# Patient Record
Sex: FEMALE | Race: BLACK OR AFRICAN AMERICAN | NOT HISPANIC OR LATINO | Employment: OTHER | URBAN - METROPOLITAN AREA
[De-identification: names, ages, dates, MRNs, and addresses within clinical notes are randomized per-mention and may not be internally consistent; named-entity substitution may affect disease eponyms.]

---

## 2017-10-02 ENCOUNTER — ALLSCRIPTS OFFICE VISIT (OUTPATIENT)
Dept: OTHER | Facility: OTHER | Age: 72
End: 2017-10-02

## 2017-10-02 DIAGNOSIS — Z13.6 ENCOUNTER FOR SCREENING FOR CARDIOVASCULAR DISORDERS: ICD-10-CM

## 2017-10-02 DIAGNOSIS — Z13.89 ENCOUNTER FOR SCREENING FOR OTHER DISORDER: ICD-10-CM

## 2017-10-02 DIAGNOSIS — D72.829 ELEVATED WHITE BLOOD CELL COUNT: ICD-10-CM

## 2017-10-02 DIAGNOSIS — Z13.29 ENCOUNTER FOR SCREENING FOR OTHER SUSPECTED ENDOCRINE DISORDER: ICD-10-CM

## 2017-10-02 DIAGNOSIS — Z13.820 ENCOUNTER FOR SCREENING FOR OSTEOPOROSIS: ICD-10-CM

## 2017-10-02 DIAGNOSIS — Z13.5 ENCOUNTER FOR SCREENING FOR EYE AND EAR DISORDERS: ICD-10-CM

## 2017-10-04 ENCOUNTER — TRANSCRIBE ORDERS (OUTPATIENT)
Dept: ADMINISTRATIVE | Facility: HOSPITAL | Age: 72
End: 2017-10-04

## 2017-10-04 DIAGNOSIS — Z12.39 SCREENING BREAST EXAMINATION: Primary | ICD-10-CM

## 2017-10-10 ENCOUNTER — TRANSCRIBE ORDERS (OUTPATIENT)
Dept: ADMINISTRATIVE | Facility: HOSPITAL | Age: 72
End: 2017-10-10

## 2017-10-10 ENCOUNTER — APPOINTMENT (OUTPATIENT)
Dept: LAB | Facility: HOSPITAL | Age: 72
End: 2017-10-10
Attending: FAMILY MEDICINE
Payer: MEDICARE

## 2017-10-10 ENCOUNTER — GENERIC CONVERSION - ENCOUNTER (OUTPATIENT)
Dept: OTHER | Facility: OTHER | Age: 72
End: 2017-10-10

## 2017-10-10 DIAGNOSIS — Z13.5 ENCOUNTER FOR SCREENING FOR EYE AND EAR DISORDERS: ICD-10-CM

## 2017-10-10 DIAGNOSIS — Z13.6 ENCOUNTER FOR SCREENING FOR CARDIOVASCULAR DISORDERS: ICD-10-CM

## 2017-10-10 DIAGNOSIS — Z13.89 ENCOUNTER FOR SCREENING FOR OTHER DISORDER: ICD-10-CM

## 2017-10-10 DIAGNOSIS — Z13.29 ENCOUNTER FOR SCREENING FOR OTHER SUSPECTED ENDOCRINE DISORDER: ICD-10-CM

## 2017-10-10 DIAGNOSIS — D72.829 ELEVATED WHITE BLOOD CELL COUNT: ICD-10-CM

## 2017-10-10 LAB
ALBUMIN SERPL BCP-MCNC: 3.5 G/DL (ref 3.5–5)
ALP SERPL-CCNC: 60 U/L (ref 46–116)
ALT SERPL W P-5'-P-CCNC: 25 U/L (ref 12–78)
ANION GAP SERPL CALCULATED.3IONS-SCNC: 6 MMOL/L (ref 4–13)
AST SERPL W P-5'-P-CCNC: 18 U/L (ref 5–45)
BASOPHILS # BLD AUTO: 0 THOUSANDS/ΜL (ref 0–0.1)
BASOPHILS NFR BLD AUTO: 0 % (ref 0–1)
BILIRUB SERPL-MCNC: 0.6 MG/DL (ref 0.2–1)
BUN SERPL-MCNC: 20 MG/DL (ref 5–25)
CALCIUM SERPL-MCNC: 9.4 MG/DL (ref 8.3–10.1)
CHLORIDE SERPL-SCNC: 105 MMOL/L (ref 100–108)
CHOLEST SERPL-MCNC: 187 MG/DL (ref 50–200)
CO2 SERPL-SCNC: 31 MMOL/L (ref 21–32)
CREAT SERPL-MCNC: 0.88 MG/DL (ref 0.6–1.3)
EOSINOPHIL # BLD AUTO: 0 THOUSAND/ΜL (ref 0–0.61)
EOSINOPHIL NFR BLD AUTO: 0 % (ref 0–6)
ERYTHROCYTE [DISTWIDTH] IN BLOOD BY AUTOMATED COUNT: 15.8 % (ref 11.6–15.1)
GFR SERPL CREATININE-BSD FRML MDRD: 78 ML/MIN/1.73SQ M
GLUCOSE P FAST SERPL-MCNC: 86 MG/DL (ref 65–99)
HCT VFR BLD AUTO: 37.8 % (ref 37–47)
HDLC SERPL-MCNC: 84 MG/DL (ref 40–60)
HGB BLD-MCNC: 12.8 G/DL (ref 12–16)
LDLC SERPL CALC-MCNC: 92 MG/DL (ref 0–100)
LYMPHOCYTES # BLD AUTO: 2.1 THOUSANDS/ΜL (ref 0.6–4.47)
LYMPHOCYTES NFR BLD AUTO: 57 % (ref 14–44)
MCH RBC QN AUTO: 28.6 PG (ref 27–31)
MCHC RBC AUTO-ENTMCNC: 34 G/DL (ref 31.4–37.4)
MCV RBC AUTO: 84 FL (ref 82–98)
MONOCYTES # BLD AUTO: 0.3 THOUSAND/ΜL (ref 0.17–1.22)
MONOCYTES NFR BLD AUTO: 9 % (ref 4–12)
NEUTROPHILS # BLD AUTO: 1.3 THOUSANDS/ΜL (ref 1.85–7.62)
NEUTS SEG NFR BLD AUTO: 34 % (ref 43–75)
NRBC BLD AUTO-RTO: 0 /100 WBCS
PLATELET # BLD AUTO: 228 THOUSANDS/UL (ref 130–400)
PLATELET BLD QL SMEAR: ADEQUATE
PMV BLD AUTO: 7.2 FL (ref 8.9–12.7)
POTASSIUM SERPL-SCNC: 3.6 MMOL/L (ref 3.5–5.3)
PROT SERPL-MCNC: 7.6 G/DL (ref 6.4–8.2)
RBC # BLD AUTO: 4.48 MILLION/UL (ref 4.2–5.4)
SODIUM SERPL-SCNC: 142 MMOL/L (ref 136–145)
TRIGL SERPL-MCNC: 53 MG/DL
TSH SERPL DL<=0.05 MIU/L-ACNC: 3.19 UIU/ML (ref 0.36–3.74)
WBC # BLD AUTO: 3.7 THOUSAND/UL (ref 4.8–10.8)

## 2017-10-10 PROCEDURE — 84443 ASSAY THYROID STIM HORMONE: CPT

## 2017-10-10 PROCEDURE — 80061 LIPID PANEL: CPT

## 2017-10-10 PROCEDURE — 36415 COLL VENOUS BLD VENIPUNCTURE: CPT

## 2017-10-10 PROCEDURE — 85025 COMPLETE CBC W/AUTO DIFF WBC: CPT

## 2017-10-10 PROCEDURE — 80053 COMPREHEN METABOLIC PANEL: CPT

## 2017-10-13 ENCOUNTER — GENERIC CONVERSION - ENCOUNTER (OUTPATIENT)
Dept: OTHER | Facility: OTHER | Age: 72
End: 2017-10-13

## 2017-10-13 ENCOUNTER — HOSPITAL ENCOUNTER (OUTPATIENT)
Dept: RADIOLOGY | Facility: HOSPITAL | Age: 72
Discharge: HOME | End: 2017-10-13
Attending: FAMILY MEDICINE
Payer: MEDICARE

## 2017-10-13 DIAGNOSIS — Z13.820 ENCOUNTER FOR SCREENING FOR OSTEOPOROSIS: ICD-10-CM

## 2017-10-13 DIAGNOSIS — Z12.39 SCREENING BREAST EXAMINATION: ICD-10-CM

## 2017-10-13 PROCEDURE — G0202 SCR MAMMO BI INCL CAD: HCPCS

## 2017-10-13 PROCEDURE — 77080 DXA BONE DENSITY AXIAL: CPT

## 2017-10-16 ENCOUNTER — GENERIC CONVERSION - ENCOUNTER (OUTPATIENT)
Dept: OTHER | Facility: OTHER | Age: 72
End: 2017-10-16

## 2017-11-06 ENCOUNTER — ALLSCRIPTS OFFICE VISIT (OUTPATIENT)
Dept: OTHER | Facility: OTHER | Age: 72
End: 2017-11-06

## 2017-11-07 NOTE — PROGRESS NOTES
Assessment  1  History of Other acute sinusitis (461 8) (J01 80)   2  Other acute sinusitis (461 8) (J01 80)    Plan  Other acute sinusitis    · Amoxicillin-Pot Clavulanate 875-125 MG Oral Tablet (Augmentin); TAKE 1 TABLET  EVERY 12 HOURS DAILY   · Benzonatate 200 MG Oral Capsule; TAKE 1 CAPSULE 3 TIMES DAILY AS  NEEDED    Chief Complaint  chest congestion ,sore throat, fever      History of Present Illness  HPI: Pt states she is congested has a cough as well a fever, throat hurts   over a week agois sick as wellhas been getting worse      Review of Systems    Constitutional: fever,-- feeling poorly-- and-- chills, but-- as noted in HPI    ENT: earache,-- sore throat,-- nasal discharge-- and-- hoarseness  Cardiovascular: no complaints of slow or fast heart rate, no chest pain, no palpitations, no leg claudication or lower extremity edema  Respiratory: cough, but-- as noted in HPI  Breasts: no complaints of breast pain, breast lump or nipple discharge  Gastrointestinal: no complaints of abdominal pain, no constipation, no nausea or diarrhea, no vomiting, no bloody stools  Genitourinary: no complaints of dysuria, no incontinence, no pelvic pain, no dysmenorrhea, no vaginal discharge or abnormal vaginal bleeding  Active Problems  1  Abnormal mammogram of right breast (793 80) (R92 8)   2  BMI 28 0-28 9,adult (V85 24) (Z68 28)   3  Encounter for colorectal cancer screening (V76 51) (Z12 11,Z12 12)   4  Encounter for screening breast examination (V76 10) (Z12 31)   5  Encounter for screening for malignant neoplasm of colon (V76 51) (Z12 11)   6  Encounter for screening for osteoporosis (V82 81) (Z13 820)   7  Encounter for screening mammogram for malignant neoplasm of breast (V76 12)   (Z12 31)   8  Encounter for special screening examination for genitourinary disorder (V81 6) (Z13 89)   9  History of colon polyps (V12 72) (Z86 010)   10  Leukocytosis (288 60) (D72 829)   11   Medicare annual wellness visit, subsequent (V70 0) (Z00 00)   12  Need for influenza vaccination (V04 81) (Z23)   13  Need for pneumococcal vaccination (V03 82) (Z23)   14  Neutropenia, unspecified (288 00) (D70 9)   15  Never a smoker   16  Overweight (BMI 25 0-29 9) (278 02) (E66 3)   17  Screening for cardiovascular condition (V81 2) (Z13 6)   18  Screening for cervical cancer (V76 2) (Z12 4)   19  Screening for genitourinary condition (V81 6) (Z13 89)   20  Screening for glaucoma (V80 1) (Z13 5)   21  Screening for thyroid disorder (V77 0) (Z13 29)    Past Medical History  1  History of Cellulitis of hand (682 4) (L03 119)   2  History of leukocytosis (V12 3) (Z86 2)   3  History of Medicare annual wellness visit, initial (V70 0) (Z00 00)  Active Problems And Past Medical History Reviewed: The active problems and past medical history were reviewed and updated today  Family History  Mother    1  Family history of Arthritis of knee   2  Family history of hypertension (V17 49) (Z82 49)   3  Family history of Pacemaker Placement  Father    4  Family history of hypertension (V17 49) (Z82 49)  Brother    5  Family history of cardiac disorder (V17 49) (Z82 49)  Family History Reviewed: The family history was reviewed and updated today  Social History   · Never a smoker  The social history was reviewed and updated today  Surgical History  1  History of  Section  Surgical History Reviewed: The surgical history was reviewed and updated today  Current Meds   1  Calcium 500 MG TABS; TAKE 1 TABLET DAILY; Therapy: [id number] to Recorded   2  Multi-Day Oral Tablet; TAKE 1 TABLET DAILY; Therapy: 36VPJ3485 to Recorded   3  Osteo-Bi-Flex TABS; TWICE  A DAY; Therapy: ([phone] 15) to Recorded    The medication list was reviewed and updated today  Allergies  1   No Known Drug Allergies    Vitals   Recorded: 14BHL7796 02:45PM   Temperature 100 1 F   Heart Rate 72   Respiration 20   Systolic 635   Diastolic 76 Height 5 ft 2 5 in   Weight 157 lb    BMI Calculated 28 26   BSA Calculated 1 73     Physical Exam    Constitutional   General appearance: No acute distress, well appearing and well nourished  Eyes   Conjunctiva and lids: No swelling, erythema or discharge  Ears, Nose, Mouth, and Throat   External inspection of ears and nose: Normal     Otoscopic examination: Abnormal   The right tympanic membrane was bulging  The left tympanic membrane was bulging  Nasal mucosa, septum, and turbinates: Abnormal   The bilateral nasal mucosa was red  Oropharynx: Abnormal   The posterior pharynx was erythematous  Pulmonary   Auscultation of lungs: Clear to auscultation  Cardiovascular   Auscultation of heart: Normal rate and rhythm, normal S1 and S2, without murmurs           Signatures   Electronically signed by : Nikki Mendez DO; Nov 6 2017  3:08PM EST                       (Author)

## 2018-01-11 NOTE — RESULT NOTES
Verified Results  * MAMMO SCREENING BILATERAL W CAD [id number] 09:14AM Sue Brooks     Test Name Result Flag Reference   MAMMO SCREENING BILATERAL W CAD (Report)     Patient History:   Patient is postmenopausal and is of Ashkenazi Sikhism descent  No known family history of cancer  Patient's BMI is 28 3  Reason for exam: screening (asymptomatic)  Mammo Screening Bilateral W CAD: October 10, 2016 - Check In #:    [de-identified]   Bilateral CC and MLO view(s) were taken  Technologist: BERTIN Rocha   Prior study comparison: October 5, 2015, [facility] screening bilateral   digital vikas performed at [place] [address]  June 26, 2014, digital bilateral screening mammogram, performed    at [facility]  July 18, 2013, right breast digital unilat    mammo/CAD, performed at [facility]  Hortencia 3, 2013, digital    bilateral screening mammogram, performed at [facility]  June 12, 2012, bilateral screening mammogram, performed at [facility]  June 13, 2011, bilateral screening mammogram, performed   at [facility]  There are scattered fibroglandular densities  No new dominant    soft tissue mass, architectural distortion or suspicious    calcifications are noted  The skin and nipple structures are    within normal limits  Stable left asymmetric tissue  No mammographic evidence of malignancy  No    significant changes when compared with prior studies  ASSESSMENT: BiRad:2 - Benign     Recommendation:   Routine screening mammogram of both breasts in 1 year  Analyzed by CAD     8-10% of cancers will be missed on mammography  Management of a    palpable abnormality must be based on clinical grounds  Patients   will be notified of their results via letter from our facility  Accredited by [employer] and FDA       Transcription Location: [facility] [address] 98: [id number]     Risk Value(s):   Theresa 10 Year: 2 055%, Theresa Lifetime: 3 773%,    Myriad Table: 8 2%, DANIKA 5 Year: 1 7%, NCI Lifetime: 5 3%   Signed by:   Fritz Murdock MD   10/10/16       Discussion/Summary   mammo acceptable please repeat in a year

## 2018-01-11 NOTE — RESULT NOTES
Discussion/Summary   acceptable bone density     Verified Results  * DXA BONE DENSITY SPINE HIP AND PELVIS 13Oct2017 09:13AM Melani Salmeron Order Number: [id number]    - Patient Instructions: To schedule this appointment, please contact Central Scheduling at [phone]  Test Name Result Flag Reference   DXA BONE DENSITY SPINE HIP AND PELVIS (Report)     CENTRAL DXA SCAN     CLINICAL HISTORY: 57-year-old woman  Menopause at age 54  OTHER RISK FACTORS: None  TECHNIQUE: Bone densitometry was performed using a [employer] bone densitometer  Regions of interest appear properly placed  COMPARISON: October 5, 2015  RESULTS:      LUMBAR SPINE L1-L4: BMD 1 258 gm/cm2 / T-score 0 5 / Z score 1 5         LEFT TOTAL HIP: BMD 1 115 gm/cm2 / T-score 0 9 / Z score 1 3   LEFT FEMORAL NECK: BMD 1 063 gm/cm2 / T score 0 2 / Z score 0 9     RIGHT TOTAL HIP: BMD 1 028 gm/cm2 / T-score 0 2 / Z score 0 6   RIGHT FEMORAL NECK: BMD 0 958 gm/cm2 / T score -0 6 / Z score 0 2     MEAN TOTAL HIP: BMD 1 072 gm/cm2     CHANGE: Since the last DXA:   LUMBAR SPINE BMD has increased 0 007 gm/cm2 or 0 6%  This change is not statistically significant  HIP BMD has decreased 0 007 gm/cm2 or 0 6%  This change is not statistically significant  IMPRESSION:     1  Normal bone density  2  Since a DXA study from October 5, 2015, there has been no statistically significant change in bone mineral density  2  The 10 year risk of hip fracture is 0 3% with the 10 year risk of major osteoporotic fracture being 3 5% as calculated by the [facility]/WHO fracture risk assessment tool (FRAX)  3  The current NOF guidelines recommend treating patients with a T-score of -2 5 or less in the lumbar spine or hips, or in post-menopausal women and men over the age of 48 with low bone mass (osteopenia) and a FRAX 10 year risk score of >3% for hip    fracture and/or >20% for major osteoporotic fracture       4  A daily intake of at least 1200 mg calcium and vitamin D 800- 1000 IU, as well as weight bearing and muscle strengthening exercise, fall prevention and avoidance of tobacco and excessive alcohol as preventive measurements are suggested  5  Follow-up DXA in two years is recommended for most patients  A one year follow-up DXA is recommended after initiation or change in therapy for osteoporosis  More frequent evaluation is also appropriate for patients with conditions associated with    rapid bone loss, such as glucocorticoid therapy  The FRAX tool has not been validated in patients currently or previously treated with pharmacotherapy for osteoporosis  In such patients, clinical judgment must be exercised in interpreting FRAX scores  It is not appropriate to use FRAX to monitor    treatment response  WHO CLASSIFICATION:   Normal (a T-score of -1 0 or higher)   Low bone mineral density (a T-score of less than -1 0 but higher than -2 5)   Osteoporosis (a T-score of -2 5 or less)   Severe osteoporosis (a T-score of -2 5 or less with a fragility fracture)     Least significant change (lumbar spine): 0 039 gm/cm2 (3 6%)  Least significant change (total hip): 0 019 gm/cm2 (2 1%)  Workstation performed: [id number]     Signed by:   Di Diop MD   10/16/17       Plan  Encounter for screening for osteoporosis    · * Dexa Scan Axial Skel (Hip, Pelvis, Spine) ; every 2 years; Last [id number];  Next  67UAF0091; Status:Active

## 2018-01-13 VITALS
WEIGHT: 157.13 LBS | HEIGHT: 63 IN | BODY MASS INDEX: 27.84 KG/M2 | RESPIRATION RATE: 18 BRPM | TEMPERATURE: 98.4 F | DIASTOLIC BLOOD PRESSURE: 66 MMHG | HEART RATE: 68 BPM | SYSTOLIC BLOOD PRESSURE: 138 MMHG

## 2018-01-13 NOTE — RESULT NOTES
Discussion/Summary   lab acceptable     Verified Results  (1) COMPREHENSIVE METABOLIC PANEL [id number] 58:30DL Mela Diaz Order Number: [id number]     Test Name Result Flag Reference   SODIUM 142 mmol/L  136-145   POTASSIUM 3 6 mmol/L  3 5-5 3   CHLORIDE 105 mmol/L  100-108   CARBON DIOXIDE 31 mmol/L  21-32   ANION GAP (CALC) 6 mmol/L  4-13   BLOOD UREA NITROGEN 20 mg/dL  5-25   CREATININE 0 88 mg/dL  0 60-1 30   Standardized to IDMS reference method   CALCIUM 9 4 mg/dL  8 3-10 1   BILI, TOTAL 0 60 mg/dL  0 20-1 00   ALK PHOSPHATAS 60 U/L     ALT (SGPT) 25 U/L  12-78   Specimen collection should occur prior to Sulfasalazine administration due to the potential for falsely depressed results  AST(SGOT) 18 U/L  5-45   Specimen collection should occur prior to Sulfasalazine administration due to the potential for falsely depressed results  ALBUMIN 3 5 g/dL  3 5-5 0   TOTAL PROTEIN 7 6 g/dL  6 4-8 2   eGFR 78 ml/min/1 73sq m     [facility] Disease Education Program recommendations are as follows:  GFR calculation is accurate only with a steady state creatinine  Chronic Kidney disease less than 60 ml/min/1 73 sq  meters  Kidney failure less than 15 ml/min/1 73 sq  meters  GLUCOSE FASTING 86 mg/dL  65-99   Specimen collection should occur prior to Sulfasalazine administration due to the potential for falsely depressed results  Specimen collection should occur prior to Sulfapyridine administration due to the potential for falsely elevated results  (1) LIPID PANEL, FASTING [id number] 07:47AM Mela Diaz Order Number: [id number]     Test Name Result Flag Reference   CHOLESTEROL 187 mg/dL     HDL,DIRECT 84 mg/dL H 40-60   Specimen collection should occur prior to Metamizole administration due to the potential for falsley depressed results     LDL CHOLESTEROL CALCULATED 92 mg/dL  0-100   Triglyceride:        Normal <150 mg/dl   Borderline High 150-199 mg/dl   High 200-499 mg/dl   Very High >499 mg/dl      Cholesterol:       Desirable <200 mg/dl    Borderline High 200-239 mg/dl    High >239 mg/dl      HDL Cholesterol:       High>59 mg/dL    Low <41 mg/dL      This screening LDL is a calculated result  It does not have the accuracy of the Direct Measured LDL in the monitoring of patients with hyperlipidemia and/or statin therapy  Direct Measure LDL (ZCU149) must be ordered separately in these patients  TRIGLYCERIDES 53 mg/dL  <=150   Specimen collection should occur prior to N-Acetylcysteine or Metamizole administration due to the potential for falsely depressed results  (1) CBC/PLT/DIFF 10Oct2017 07:47AM Alirio Fierro Order Number: [id number]     Test Name Result Flag Reference   WBC COUNT 3 70 Thousand/uL L 4 80-10 80   RBC COUNT 4 48 Million/uL  4 20-5 40   HEMOGLOBIN 12 8 g/dL  12 0-16 0   HEMATOCRIT 37 8 %  37 0-47 0   MCV 84 fL  82-98   MCH 28 6 pg  27 0-31 0   MCHC 34 0 g/dL  31 4-37 4   RDW 15 8 % H 11 6-15 1   MPV 7 2 fL L 8 9-12 7   PLATELET COUNT 423 Thousands/uL  130-400   nRBC AUTOMATED 0 /100 WBCs     NEUTROPHILS RELATIVE PERCENT 34 % L 43-75   LYMPHOCYTES RELATIVE PERCENT 57 % H 14-44   MONOCYTES RELATIVE PERCENT 9 %  4-12   EOSINOPHILS RELATIVE PERCENT 0 %  0-6   BASOPHILS RELATIVE PERCENT 0 %  0-1   NEUTROPHILS ABSOLUTE COUNT 1 30 Thousands/? ??L L 1 85-7 62   LYMPHOCYTES ABSOLUTE COUNT 2 10 Thousands/? ??L  0 60-4 47   MONOCYTES ABSOLUTE COUNT 0 30 Thousand/? ??L  0 17-1 22   EOSINOPHILS ABSOLUTE COUNT 0 00 Thousand/? ??L  0 00-0 61   BASOPHILS ABSOLUTE COUNT 0 00 Thousands/? ??L  0 00-0 10   PLT ESTIMATE Adequate  Adequate     (1) TSH 10Oct2017 07:47AM Franz Butter   TW Order Number: [id number]     Test Name Result Flag Reference   TSH 3 192 uIU/mL  0 358-3 740   Patients undergoing fluorescein dye angiography may retain small amounts of fluorescein in the body for 48-72 hours post procedure   Samples containing fluorescein can produce falsely depressed TSH values  If the patient had this procedure,a specimen should be resubmitted post fluorescein clearance            The recommended reference ranges for TSH during pregnancy are as follows:  First trimester 0 1 to 2 5 uIU/mL  Second trimester  0 2 to 3 0 uIU/mL  Third trimester 0 3 to 3 0 uIU/m

## 2018-01-14 VITALS
HEART RATE: 72 BPM | DIASTOLIC BLOOD PRESSURE: 76 MMHG | WEIGHT: 157 LBS | RESPIRATION RATE: 20 BRPM | SYSTOLIC BLOOD PRESSURE: 126 MMHG | TEMPERATURE: 100.1 F | HEIGHT: 63 IN | BODY MASS INDEX: 27.82 KG/M2

## 2018-01-14 NOTE — RESULT NOTES
Discussion/Summary   mammo is acceptable please repeat in a year     Verified Results  * MAMMO SCREENING BILATERAL W CAD [id number] 09:14AM Brooks Zuniga     Test Name Result Flag Reference   MAMMO SCREENING BILATERAL W CAD (Report)     Patient History:   Patient is postmenopausal    No known family history of cancer  Patient's BMI is 28 3  Reason for exam: screening, asymptomatic  Screening     Mammo Screening Bilateral W CAD: October 13, 2017 - Check In #:    [de-identified]   Bilateral CC and MLO view(s) were taken  Technologist: BERTIN Garcia   Prior study comparison: October 10, 2016, mammo screening    bilateral W CAD performed at [place] [address]  October 5, 2015, [facility] screening bilateral digital vikas performed   at [place] [address]  June 26, 2014, digital    bilateral screening mammogram, performed at [facility]  July 18, 2013, right breast digital unilat mammo/CAD, performed at    [facility]  Hortencia 3, 2013, digital bilateral screening    mammogram, performed at [facility]  June 12, 2012, bilateral   screening mammogram, performed at [facility]  The breast tissue is heterogeneously dense, potentially limiting    the sensitivity of mammography  Patient risk, included in this    report, assists in determining the appropriate screening regimen    (such as 3-D mammography or the inclusion of automated breast    ultrasound or MRI)  3-D mammography may also remain indicated as    screening  No dominant soft tissue mass, architectural    distortion or suspicious calcifications are noted  The skin and    nipple contours are within normal limits  No evidence of    malignancy  No significant change when compared to the prior    studies  1  No evidence of malignancy  2  No significant change when compared with the prior study       ACR BI-RADSï¾® Assessments: BiRad:1 - Negative     Recommendation:   Routine screening mammogram of both breasts in 1 year  Analyzed by CAD     The patient is scheduled in a reminder system for screening    mammography  8-10% of cancers will be missed on mammography  Management of a    palpable abnormality must be based on clinical grounds  Patients   will be notified of their results via letter from our facility  Accredited by [employer] and FDA  Transcription Location: [facility] [address] 98: [id number]     Risk Value(s):   Tyrer-Cuzick 10 Year: 1 900%, Tyrer-Cuzick Lifetime: 3 400%,    Myriad Table: 1 5%, DANIKA 5 Year: 1 7%, NCI Lifetime: 5 1%   Signed by:   Danyell Crawley MD   10/13/17       Plan  Encounter for screening breast examination, Encounter for screening mammogram for  malignant neoplasm of breast    · * MAMMO SCREENING BILATERAL W CAD ; every 1 year;  Last [id number]; Next  [id number]; Status:Active

## 2018-01-17 NOTE — RESULT NOTES
Verified Results  (1) CBC/PLT/DIFF [id number] 07:51AM Lisbeth Alvarenga     Test Name Result Flag Reference   WBC COUNT 4 40 Thousand/uL L 4 80-10 80   WBC ADJUSTED 4 40 Thousand/uL L 4 80-10 80   RBC COUNT 4 73 Million/uL  4 20-5 40   HEMOGLOBIN 13 5 g/dL  12 0-16 0   HEMATOCRIT 40 1 %  37 0-47 0   MCV 85 fL  82-98   MCH 28 5 pg  27 0-31 0   MCHC 33 6 g/dL  31 4-37 4   RDW 14 0 %  11 6-15 1   MPV 7 8 fL L 8 9-12 7   PLATELET COUNT 218 Thousands/uL  130-400   Platelets appear adequate   NEUTROPHILS RELATIVE PERCENT 40 % L 43-75   LYMPHOCYTES RELATIVE PERCENT 51 % H 14-44   MONOCYTES RELATIVE PERCENT 8 %  4-12   EOSINOPHILS RELATIVE PERCENT 1 %  0-6   BASOPHILS RELATIVE PERCENT 0 %  0-1   NEUTROPHILS ABSOLUTE COUNT 1 70 Thousands/?L L 1 85-7 62   LYMPHOCYTES ABSOLUTE COUNT 2 20 Thousands/?L  0 60-4 47   MONOCYTES ABSOLUTE COUNT 0 40 Thousand/?L  0 17-1 22   EOSINOPHILS ABSOLUTE COUNT 0 00 Thousand/?L  0 00-0 61   BASOPHILS ABSOLUTE COUNT 0 00 Thousands/?L  0 00-0 10     (1) COMPREHENSIVE METABOLIC PANEL [id number] 63:44RL Lisbeth Alvarenga     Test Name Result Flag Reference   GLUCOSE,RANDM 85 mg/dL     If the patient is fasting, the ADA then defines impaired fasting glucose as > 100 mg/dL and diabetes as > or equal to 123 mg/dL     SODIUM 143 mmol/L  136-145   POTASSIUM 4 1 mmol/L  3 5-5 3   CHLORIDE 104 mmol/L  100-108   CARBON DIOXIDE 33 mmol/L H 21-32   ANION GAP (CALC) 6 mmol/L  4-13   BLOOD UREA NITROGEN 15 mg/dL  5-25   CREATININE 0 86 mg/dL  0 60-1 30   Standardized to IDMS reference method   CALCIUM 9 3 mg/dL  8 3-10 1   BILI, TOTAL 0 80 mg/dL  0 20-1 00   ALK PHOSPHATAS 65 U/L     ALT (SGPT) 21 U/L  12-78   AST(SGOT) 16 U/L  5-45   ALBUMIN 3 7 g/dL  3 5-5 0   TOTAL PROTEIN 7 6 g/dL  6 4-8 2   eGFR Non-African American      >60 0 ml/min/1 73sq m   "[employer] Disease Education Program recommendations are as follows:  GFR calculation is accurate only with a steady state creatinine  Chronic Kidney disease less than 60 ml/min/1 73 sq  meters  Kidney failure less than 15 ml/min/1 73 sq  meters  (1) TSH [id number] 07:51AM Jorge Luis Ricardo     Test Name Result Flag Reference   TSH 2 146 uIU/mL  0 358-3 740   Patients undergoing fluorescein dye angiography may retain small amounts of fluorescein in the body for 48-72 hours post procedure  Samples containing fluorescein can produce falsely depressed TSH values  If the patient had this procedure,a specimen should be resubmitted post fluorescein clearance  The recommended reference ranges for TSH during pregnancy are as follows:  First trimester 0 1 to 2 5 uIU/mL  Second trimester  0 2 to 3 0 uIU/mL  Third trimester 0 3 to 3 0 uIU/m     (1) LIPID PANEL, FASTING [id number] 07:51AM Jorge Luis Ricardo     Test Name Result Flag Reference   CHOLESTEROL 192 mg/dL     HDL,DIRECT 80 mg/dL H 40-60   Specimen collection should occur prior to Metamizole administration due to the potential for falsely depressed results  LDL CHOLESTEROL CALCULATED 100 mg/dL  0-100   Triglyceride:         Normal              <150 mg/dl       Borderline High    150-199 mg/dl       High               200-499 mg/dl       Very High          >499 mg/dl  Cholesterol:         Desirable        <200 mg/dl      Borderline High  200-239 mg/dl      High             >239 mg/dl  HDL Cholesterol:        High    >59 mg/dL      Low     <41 mg/dL  LDL CALCULATED:    This screening LDL is a calculated result  It does not have the accuracy of the Direct Measured LDL in the monitoring of patients with hyperlipidemia and/or statin therapy  Direct Measure LDL (ENX332) must be ordered separately in these patients  TRIGLYCERIDES 59 mg/dL  <=150   Specimen collection should occur prior to N-Acetylcysteine or Metamizole administration due to the potential for falsely depressed results         Discussion/Summary   labs stable

## 2018-01-18 NOTE — PROGRESS NOTES
Assessment    1  Medicare annual wellness visit, subsequent (V70 0) (Z00 00)   2  Encounter for screening mammogram for malignant neoplasm of breast (V76 12)   (Z12 31)   3  Screening for cervical cancer (V76 2) (Z12 4)   4  Screening for glaucoma (V80 1) (Z13 5)   5  Encounter for screening for osteoporosis (V82 81) (Z13 820)   6  Screening for genitourinary condition (V81 6) (Z13 89)   7  Never a smoker   8  BMI 28 0-28 9,adult (V85 24) (Z68 28)   9  Overweight (BMI 25 0-29 9) (278 02) (E66 3)   10  Encounter for special screening examination for genitourinary disorder (V81 6) (Z13 89)    Plan  Encounter for screening for osteoporosis    · * DXA BONE DENSITY SPINE HIP AND PELVIS; Status:Active; Requested  HUMERA:[id number];   Encounter for screening mammogram for malignant neoplasm of breast    · * MAMMO SCREENING BILATERAL W CAD; Status:Active; Requested for:02Oct2017;   Leukocytosis    · (1) TSH; Status:Active; Requested for:02Oct2017;   Leukocytosis, Screening for cardiovascular condition, Screening for genitourinary  condition, Screening for glaucoma, Screening for thyroid disorder    · (1) CBC/PLT/DIFF; Status:Active; Requested BIV:83RWT9719; Overweight (BMI 25 0-29  9)    · Eat a low fat and low cholesterol diet ; Status:Complete;   Done: 74XAJ0627   · Keep a diary of when and what you eat ; Status:Complete;   Done: 06WEV7994   · Shared Decision Making Aid given; Status:Complete;   Done: 40LNG4418   · Some eating tips that can help you lose weight ; Status:Complete;   Done: 02Oct2017   · There are many exercise options for seniors ; Status:Complete;   Done: 00NPH8419   · We recommend that you bring your body mass index down to 26 ; Status:Complete;    Done: 73TZT6379  Screening for cardiovascular condition, Screening for genitourinary condition, Screening  for glaucoma, Screening for thyroid disorder    · (1) COMPREHENSIVE METABOLIC PANEL; Status:Active;  Requested PFY:60ERP3673;    · (1) LIPID PANEL, FASTING; Status:Active; Requested ASY:54DIZ0616;   Screening for cervical cancer    · 2 - Shawn Porras MD, Lucie Christianson  (Obstetrics/Gynecology) Co-Management  *  Status: Active   Requested for: 69MSJ5260  Care Summary provided  : Yes  Screening for genitourinary condition    · *VB - Urinary Incontinence Screen (Dx Z13 89 Screen for UI); Status:Complete;   Done:  38TBP1882 03:05PM  Screening for glaucoma    · Yanelis Francisco MD, Wilbert Hong  (Ophthalmology) Co-Management  *  Status: Active  Requested  for: 17OGU8207  Care Summary provided  : Yes    Discussion/Summary  Impression: Subsequent Annual Wellness Visit  Cardiovascular screening and counseling: the risks and benefits of screening were discussed, screening is current, due for a lipid panel and Dx - V81 2 Screen for CV Disorder  Diabetes screening and counseling: the risks and benefits of screening were discussed, screening is current, due for blood glucose and Dx - V77 1 Screen for DM  Colorectal cancer screening and counseling: screening is current and Dx - V76 51 Screen for CRC  Breast cancer screening and counseling: the risks and benefits of screening were discussed and due for a screening mammogram    Cervical cancer screening and counseling: Sent to OBGYN  Osteoporosis screening and counseling: the risks and benefits of screening were discussed and due for bone density ultrasound  Glaucoma screening and counseling: screening is current, ophthalmologist referral and Dx - V80 1 Screen for Glaucoma  Chief Complaint  pt present for AWV  hg      Advance Directives  Advance Directive [facility]:   The patient is not in agreement to receive the Advance Care Planning service    NO - Patient does not have an advance health care directive        History of Present Illness  HPI: pt is here for an AWV  Pt will need mammo  Will need referral for OBGYN    pt states she has been taking vit d a and calcium daily    sees dr Phylicia Baltazar for glaucoma screening    pt is up to date on colon Welcome to [employer] and Wellness Visits: The patient is being seen for the subsequent annual wellness visit  Medicare Screening and Risk Factors   Hospitalizations: no previous hospitalizations  Medicare Screening Tests Risk Questions   Osteoporosis risk assessment: , female gender and over 48years of age  HIV risk assessment: none indicated  Drug and Alcohol Use: The patient has never smoked cigarettes  The patient reports occasional alcohol use  Alcohol concern:   The patient has no concerns about alcohol abuse  She has never used illicit drugs  She is not ready to quit using drugs  Diet and Physical Activity: Current diet includes well balanced meals  She exercises daily and exercises 5 times per week  Exercise: walking  Mood Disorder and Cognitive Impairment Screening:   Depression screening  no significant symptoms  She denies feeling down, depressed, or hopeless over the past two weeks  She reports feeling little interest or pleasure in doing things over the past two weeks  Functional Ability/Level of Safety: Hearing is normal bilaterally, normal in the right ear and normal in the left ear  Activities of daily living details: no transportation help needed, does not need help managing medications and does not need help managing money  Fall risk factors: The patient fell times in the past 12 months  0    Advance Directives: Advance directives: no living will, no durable power of  for health care directives and no advance directives  Co-Managers and Medical Equipment/Suppliers: See Patient Care Team   Preventive Quality Program 65 and Older: Falls Risk: The patient fell times in the past 12 months  0  The patient is currently asymptomatic Symptoms Include:  Associated symptoms:  No associated symptoms are reported  The patient currently has no urinary incontinence symptoms     Date of last glaucoma screen was Dr Jamari Sheffield      Patient Care Team    Care Team Member Role Specialty Office Number   Dung Rossi DO Referring Family Medicine [phone]   Marc De Santiago MD Specialist Ophthalmology [phone]     Active Problems    1  Abnormal mammogram of right breast (793 80) (R92 8)   2  Encounter for colorectal cancer screening (V76 51) (Z12 11,Z12 12)   3  Encounter for screening breast examination (V76 10) (Z12 31)   4  Encounter for screening for malignant neoplasm of colon (V76 51) (Z12 11)   5  Encounter for screening for osteoporosis (V82 81) (Z13 820)   6  Encounter for screening mammogram for malignant neoplasm of breast (V76 12)   (Z12 31)   7  History of colon polyps (V12 72) (Z86 010)   8  Need for influenza vaccination (V04 81) (Z23)   9  Need for pneumococcal vaccination (V03 82) (Z23)   10  Neutropenia, unspecified (288 00) (D70 9)   11  Never a smoker   12  Screening for cardiovascular condition (V81 2) (Z13 6)   13  Screening for genitourinary condition (V81 6) (Z13 89)   14  Screening for thyroid disorder (V77 0) (Z13 29)    Past Medical History    · History of Cellulitis of hand (682 4) (L03 119)   · History of leukocytosis (V12 3) (Z86 2)   · History of Medicare annual wellness visit, initial (V70 0) (Z00 00)    The active problems and past medical history were reviewed and updated today  Surgical History    · History of  Section    The surgical history was reviewed and updated today  Family History  Mother    · Family history of Arthritis of knee   · Family history of hypertension (V17 49) (Z82 49)   · Family history of Pacemaker Placement  Father    · Family history of hypertension (V17 49) (Z82 49)  Brother    · Family history of cardiac disorder (V17 49) (Z82 49)    The family history was reviewed and updated today  Social History    · Never a smoker  The social history was reviewed and updated today  Current Meds   1  Calcium 500 MG TABS; TAKE 1 TABLET DAILY; Therapy: 04WGH4601 to Recorded   2   Multi-Day Oral Tablet; TAKE 1 TABLET DAILY; Therapy: 42MJJ5193 to Recorded   3  Osteo-Bi-Flex TABS; TWICE  A DAY; Therapy: ([phone]) to Recorded    The medication list was reviewed and updated today  Allergies    1  No Known Drug Allergies    Immunizations   1    Influenza  09-Oct-2015    PCV  23-Sep-2015     Vitals  Signs    Temperature: 98 4 F  Heart Rate: 68  Respiration: 18  Systolic: 052  Diastolic: 66  Height: 5 ft 2 5 in  Weight: 157 lb 2 oz  BMI Calculated: 28 28  BSA Calculated: 1 74    Physical Exam    Constitutional   General appearance: No acute distress, well appearing and well nourished  Head and Face   Head and face: Normal     Palpation of the face and sinuses: No sinus tenderness  Eyes   Conjunctiva and lids: No swelling, erythema or discharge  Pupils and irises: Equal, round, reactive to light  Ears, Nose, Mouth, and Throat   External inspection of ears and nose: Normal     Otoscopic examination: Tympanic membranes translucent with normal light reflex  Canals patent without erythema  Neck   Neck: Supple, symmetric, trachea midline, no masses  Thyroid: Normal, no thyromegaly  Pulmonary   Respiratory effort: No increased work of breathing or signs of respiratory distress  Cardiovascular   Palpation of heart: Normal PMI, no thrills  Auscultation of heart: Normal rate and rhythm, normal S1 and S2, no murmurs  Abdomen   Abdomen: Non-tender, no masses  Liver and spleen: No hepatomegaly or splenomegaly  Lymphatic   Palpation of lymph nodes in neck: No lymphadenopathy  Palpation of lymph nodes in axillae: No lymphadenopathy  Palpation of lymph nodes in groin: No lymphadenopathy  Palpation of lymph nodes in other areas: No lymphadenopathy  Musculoskeletal   Gait and station: Normal     Skin   Skin and subcutaneous tissue: Normal without rashes or lesions         Results/Data  *VB - Urinary Incontinence Screen (Dx Z13 89 Screen for UI) 02Oct2017 03:54PM Carole Fend     Test Name Result Flag Reference   Urinary Incontinence Assessment 09GLX3429       Falls Risk Assessment (Dx Z13 89 Screen for Neurologic Disorder) 57QUO5848 03:53PM User, Ahs     Test Name Result Flag Reference   Falls Risk      No falls in the past year     *VB - Urinary Incontinence Screen (Dx Z13 89 Screen for UI) 48NYV8982 03:05PM Marble Fend     Test Name Result Flag Reference   Urinary Incontinence Assessment 32Wqp9011         Procedure    Procedure:   Results: 20/50 in both eyes with corrective device, 20/30 in the right eye with corrective device, 20/40 in the left eye with corrective device      Health Management  Encounter for screening breast examination   * MAMMO SCREENING BILATERAL W CAD; every 1 year; Last 73HOC4465; Next Due:  55BPY6278; Near Due  Encounter for screening for osteoporosis   * Dexa Scan Axial Skel (Hip, Pelvis, Spine); every 2 years; Last 36FEC0567; Next Due:  60CLJ2403; Near Due  Encounter for screening mammogram for malignant neoplasm of breast   * MAMMO SCREENING BILATERAL W CAD; every 1 year; Last 55EEU4087; Next Due:  65IRT7122; Near Due  History of colon polyps   COLONOSCOPY; every 5 years; Last 69LTC8038; Next Due: 83JIU6103;  Active    Signatures   Electronically signed by : Kenji Clifton DO; Oct  2 2017  4:39PM EST                       (Author)

## 2018-01-18 NOTE — RESULT NOTES
Message   Please inform the patient that the polyp removed was a tubular adenoma  There was no high grade dysplasia and no cancer  She should have a repeat colonoscopy in 5 years  Please have her call with any questions or concerns  Verified Results  (1) TISSUE EXAM [id number] 12:44PM Juan Khan     Test Name Result Flag Reference   LAB AP CASE REPORT (Report)     Surgical Pathology Report             Case: [id number]                   Authorizing Provider: Kit Villagran MD      Collected:      05/09/2016 1244        Ordering Location:   [facility]  Received:      05/09/2016 5162                    Center                                     Pathologist:      Jem Covarrubias MD                                 Specimen:  Polyp, Colorectal, descending polyp- snare   LAB AP FINAL DIAGNOSIS      A  Descending colon polyp (biopsy):    - Tubular adenoma  - No high grade dysplasia or malignancy identified  LAB AP NOTE      Interpretation performed at [facility], [address] [place] [address]   [id number]  LAB AP SURGICAL ADDITIONAL INFORMATION (Report)     These tests were developed and their performance characteristics   determined by [facility] or [employer]  They may not be cleared or approved by the [place]  Food and   Drug Administration  The FDA has determined that such clearance or   approval is not necessary  These tests are used for clinical purposes  They should not be regarded as investigational or for research  This   laboratory has been approved by [place] 88, designated as a high-complexity   laboratory and is qualified to perform these tests  LAB AP GROSS DESCRIPTION (Report)     A  The specimen is received in formalin, labeled with the patient's name   and hospital number, and is designated descending polyp  The specimen   consists of one tan-pink soft tissue fragment measuring 0 3 centimeters in   greatest dimension  Entirely submitted   One cassette  Note: The estimated total formalin fixation time based upon information   provided by the submitting clinician and the standard processing schedule   is 25 25 hours  MAS   LAB AP CLINICAL INFORMATION      Colon cancer screening       Discussion/Summary   A tubular adenoma is a precancerous polyp  These polyps, if left alone, have a small risk of developing a cancer over 5-10 years  Your polyp has been completely removed       Signatures   Electronically signed by : CRISTOBAL Benoit ; May 22 2016 10:51PM EST                       (Author)

## 2019-03-29 DIAGNOSIS — Z23 NEED FOR VACCINATION: ICD-10-CM

## 2019-03-29 DIAGNOSIS — Z00.00 MEDICARE ANNUAL WELLNESS VISIT, SUBSEQUENT: Primary | ICD-10-CM

## 2019-03-29 DIAGNOSIS — Z12.31 ENCOUNTER FOR SCREENING MAMMOGRAM FOR MALIGNANT NEOPLASM OF BREAST: ICD-10-CM

## 2019-03-29 PROBLEM — R92.30 DENSE BREAST TISSUE: Status: ACTIVE | Noted: 2019-03-29

## 2019-03-29 PROBLEM — R92.2 DENSE BREAST TISSUE: Status: ACTIVE | Noted: 2019-03-29

## 2019-04-02 ENCOUNTER — OFFICE VISIT (OUTPATIENT)
Dept: FAMILY MEDICINE CLINIC | Facility: CLINIC | Age: 74
End: 2019-04-02
Payer: MEDICARE

## 2019-04-02 VITALS
TEMPERATURE: 97.6 F | DIASTOLIC BLOOD PRESSURE: 80 MMHG | HEART RATE: 82 BPM | HEIGHT: 64 IN | SYSTOLIC BLOOD PRESSURE: 132 MMHG | RESPIRATION RATE: 18 BRPM | WEIGHT: 164 LBS | BODY MASS INDEX: 28 KG/M2

## 2019-04-02 DIAGNOSIS — R92.2 DENSE BREAST TISSUE: ICD-10-CM

## 2019-04-02 DIAGNOSIS — Z23 NEED FOR VACCINATION: ICD-10-CM

## 2019-04-02 DIAGNOSIS — R01.1 MURMUR: ICD-10-CM

## 2019-04-02 DIAGNOSIS — Z12.31 ENCOUNTER FOR SCREENING MAMMOGRAM FOR MALIGNANT NEOPLASM OF BREAST: ICD-10-CM

## 2019-04-02 DIAGNOSIS — Z00.00 MEDICARE ANNUAL WELLNESS VISIT, SUBSEQUENT: Primary | ICD-10-CM

## 2019-04-02 DIAGNOSIS — Z11.59 NEED FOR HEPATITIS C SCREENING TEST: ICD-10-CM

## 2019-04-02 DIAGNOSIS — Z13.820 SCREENING FOR OSTEOPOROSIS: ICD-10-CM

## 2019-04-02 PROCEDURE — 90732 PPSV23 VACC 2 YRS+ SUBQ/IM: CPT | Performed by: NURSE PRACTITIONER

## 2019-04-02 PROCEDURE — 90714 TD VACC NO PRESV 7 YRS+ IM: CPT | Performed by: NURSE PRACTITIONER

## 2019-04-02 PROCEDURE — G0439 PPPS, SUBSEQ VISIT: HCPCS | Performed by: NURSE PRACTITIONER

## 2019-04-02 PROCEDURE — G0009 ADMIN PNEUMOCOCCAL VACCINE: HCPCS | Performed by: NURSE PRACTITIONER

## 2019-04-02 PROCEDURE — 90471 IMMUNIZATION ADMIN: CPT | Performed by: NURSE PRACTITIONER

## 2019-04-03 ENCOUNTER — TRANSCRIBE ORDERS (OUTPATIENT)
Dept: ADMINISTRATIVE | Facility: HOSPITAL | Age: 74
End: 2019-04-03

## 2019-04-03 ENCOUNTER — APPOINTMENT (OUTPATIENT)
Dept: LAB | Facility: HOSPITAL | Age: 74
End: 2019-04-03
Payer: MEDICARE

## 2019-04-03 DIAGNOSIS — Z11.59 NEED FOR HEPATITIS C SCREENING TEST: ICD-10-CM

## 2019-04-03 DIAGNOSIS — R01.1 MURMUR: ICD-10-CM

## 2019-04-03 LAB
ALBUMIN SERPL BCP-MCNC: 3.9 G/DL (ref 3.5–5)
ALP SERPL-CCNC: 72 U/L (ref 46–116)
ALT SERPL W P-5'-P-CCNC: 26 U/L (ref 12–78)
ANION GAP SERPL CALCULATED.3IONS-SCNC: 7 MMOL/L (ref 4–13)
AST SERPL W P-5'-P-CCNC: 21 U/L (ref 5–45)
BASOPHILS # BLD AUTO: 0.01 THOUSANDS/ΜL (ref 0–0.1)
BASOPHILS NFR BLD AUTO: 0 % (ref 0–1)
BILIRUB SERPL-MCNC: 0.5 MG/DL (ref 0.2–1)
BUN SERPL-MCNC: 18 MG/DL (ref 5–25)
CALCIUM SERPL-MCNC: 9.6 MG/DL (ref 8.3–10.1)
CHLORIDE SERPL-SCNC: 104 MMOL/L (ref 100–108)
CO2 SERPL-SCNC: 31 MMOL/L (ref 21–32)
CREAT SERPL-MCNC: 0.85 MG/DL (ref 0.6–1.3)
EOSINOPHIL # BLD AUTO: 0.01 THOUSAND/ΜL (ref 0–0.61)
EOSINOPHIL NFR BLD AUTO: 0 % (ref 0–6)
ERYTHROCYTE [DISTWIDTH] IN BLOOD BY AUTOMATED COUNT: 13.3 % (ref 11.6–15.1)
GFR SERPL CREATININE-BSD FRML MDRD: 80 ML/MIN/1.73SQ M
GLUCOSE P FAST SERPL-MCNC: 90 MG/DL (ref 65–99)
HCT VFR BLD AUTO: 42 % (ref 34.8–46.1)
HCV AB SER QL: NORMAL
HGB BLD-MCNC: 13.7 G/DL (ref 11.5–15.4)
IMM GRANULOCYTES # BLD AUTO: 0.01 THOUSAND/UL (ref 0–0.2)
IMM GRANULOCYTES NFR BLD AUTO: 0 % (ref 0–2)
LYMPHOCYTES # BLD AUTO: 1.96 THOUSANDS/ΜL (ref 0.6–4.47)
LYMPHOCYTES NFR BLD AUTO: 44 % (ref 14–44)
MCH RBC QN AUTO: 28.1 PG (ref 26.8–34.3)
MCHC RBC AUTO-ENTMCNC: 32.6 G/DL (ref 31.4–37.4)
MCV RBC AUTO: 86 FL (ref 82–98)
MONOCYTES # BLD AUTO: 0.37 THOUSAND/ΜL (ref 0.17–1.22)
MONOCYTES NFR BLD AUTO: 8 % (ref 4–12)
NEUTROPHILS # BLD AUTO: 2.08 THOUSANDS/ΜL (ref 1.85–7.62)
NEUTS SEG NFR BLD AUTO: 48 % (ref 43–75)
NRBC BLD AUTO-RTO: 0 /100 WBCS
PLATELET # BLD AUTO: 243 THOUSANDS/UL (ref 149–390)
PMV BLD AUTO: 9.9 FL (ref 8.9–12.7)
POTASSIUM SERPL-SCNC: 4.1 MMOL/L (ref 3.5–5.3)
PROT SERPL-MCNC: 8.1 G/DL (ref 6.4–8.2)
RBC # BLD AUTO: 4.88 MILLION/UL (ref 3.81–5.12)
SODIUM SERPL-SCNC: 142 MMOL/L (ref 136–145)
T4 FREE SERPL-MCNC: 0.76 NG/DL (ref 0.76–1.46)
TSH SERPL DL<=0.05 MIU/L-ACNC: 4.33 UIU/ML (ref 0.36–3.74)
WBC # BLD AUTO: 4.44 THOUSAND/UL (ref 4.31–10.16)

## 2019-04-03 PROCEDURE — 86803 HEPATITIS C AB TEST: CPT

## 2019-04-03 PROCEDURE — 84443 ASSAY THYROID STIM HORMONE: CPT

## 2019-04-03 PROCEDURE — 80053 COMPREHEN METABOLIC PANEL: CPT

## 2019-04-03 PROCEDURE — 84439 ASSAY OF FREE THYROXINE: CPT

## 2019-04-03 PROCEDURE — 36415 COLL VENOUS BLD VENIPUNCTURE: CPT

## 2019-04-03 PROCEDURE — 85025 COMPLETE CBC W/AUTO DIFF WBC: CPT

## 2019-04-12 ENCOUNTER — HOSPITAL ENCOUNTER (OUTPATIENT)
Dept: NON INVASIVE DIAGNOSTICS | Facility: HOSPITAL | Age: 74
Discharge: HOME | End: 2019-04-12
Payer: MEDICARE

## 2019-04-12 DIAGNOSIS — R01.1 MURMUR: ICD-10-CM

## 2019-04-12 PROCEDURE — 93306 TTE W/DOPPLER COMPLETE: CPT

## 2019-04-12 PROCEDURE — 93306 TTE W/DOPPLER COMPLETE: CPT | Performed by: INTERNAL MEDICINE

## 2019-04-15 ENCOUNTER — TELEPHONE (OUTPATIENT)
Dept: FAMILY MEDICINE CLINIC | Facility: CLINIC | Age: 74
End: 2019-04-15

## 2019-04-15 ENCOUNTER — OFFICE VISIT (OUTPATIENT)
Dept: FAMILY MEDICINE CLINIC | Facility: CLINIC | Age: 74
End: 2019-04-15
Payer: MEDICARE

## 2019-04-15 VITALS
TEMPERATURE: 97.7 F | RESPIRATION RATE: 16 BRPM | SYSTOLIC BLOOD PRESSURE: 106 MMHG | HEIGHT: 64 IN | DIASTOLIC BLOOD PRESSURE: 66 MMHG | HEART RATE: 84 BPM | WEIGHT: 159 LBS | BODY MASS INDEX: 27.14 KG/M2

## 2019-04-15 DIAGNOSIS — E66.3 OVERWEIGHT (BMI 25.0-29.9): ICD-10-CM

## 2019-04-15 DIAGNOSIS — E03.9 ACQUIRED HYPOTHYROIDISM: Primary | ICD-10-CM

## 2019-04-15 DIAGNOSIS — M19.011 PRIMARY OSTEOARTHRITIS OF RIGHT SHOULDER: ICD-10-CM

## 2019-04-15 DIAGNOSIS — M17.11 PRIMARY OSTEOARTHRITIS OF RIGHT KNEE: ICD-10-CM

## 2019-04-15 DIAGNOSIS — D70.9 CHRONIC IDIOPATHIC NEUTROPENIA (HCC): ICD-10-CM

## 2019-04-15 PROCEDURE — 99214 OFFICE O/P EST MOD 30 MIN: CPT | Performed by: FAMILY MEDICINE

## 2019-04-15 RX ORDER — LEVOTHYROXINE SODIUM 0.03 MG/1
25 TABLET ORAL DAILY
Qty: 90 TABLET | Refills: 1 | Status: SHIPPED | OUTPATIENT
Start: 2019-04-15 | End: 2019-05-23 | Stop reason: SDUPTHER

## 2019-04-15 RX ORDER — MELOXICAM 7.5 MG/1
7.5 TABLET ORAL DAILY
Qty: 90 TABLET | Refills: 1 | Status: SHIPPED | OUTPATIENT
Start: 2019-04-15 | End: 2019-05-23 | Stop reason: SDUPTHER

## 2019-04-16 ENCOUNTER — HOSPITAL ENCOUNTER (OUTPATIENT)
Dept: RADIOLOGY | Facility: HOSPITAL | Age: 74
Discharge: HOME | End: 2019-04-16
Payer: MEDICARE

## 2019-04-16 VITALS — BODY MASS INDEX: 27.14 KG/M2 | WEIGHT: 159 LBS | HEIGHT: 64 IN

## 2019-04-16 DIAGNOSIS — R92.2 DENSE BREAST TISSUE: ICD-10-CM

## 2019-04-16 DIAGNOSIS — Z12.31 ENCOUNTER FOR SCREENING MAMMOGRAM FOR MALIGNANT NEOPLASM OF BREAST: ICD-10-CM

## 2019-04-16 PROBLEM — I27.20 PULMONARY HYPERTENSION (HCC): Status: ACTIVE | Noted: 2019-04-16

## 2019-04-16 PROCEDURE — 77067 SCR MAMMO BI INCL CAD: CPT

## 2019-04-16 PROCEDURE — 77063 BREAST TOMOSYNTHESIS BI: CPT

## 2019-05-23 ENCOUNTER — APPOINTMENT (OUTPATIENT)
Dept: LAB | Facility: HOSPITAL | Age: 74
End: 2019-05-23
Payer: MEDICARE

## 2019-05-23 ENCOUNTER — TELEPHONE (OUTPATIENT)
Dept: FAMILY MEDICINE CLINIC | Facility: CLINIC | Age: 74
End: 2019-05-23

## 2019-05-23 ENCOUNTER — TRANSCRIBE ORDERS (OUTPATIENT)
Dept: ADMINISTRATIVE | Facility: HOSPITAL | Age: 74
End: 2019-05-23

## 2019-05-23 DIAGNOSIS — E03.9 ACQUIRED HYPOTHYROIDISM: ICD-10-CM

## 2019-05-23 DIAGNOSIS — M17.11 PRIMARY OSTEOARTHRITIS OF RIGHT KNEE: ICD-10-CM

## 2019-05-23 DIAGNOSIS — M19.011 PRIMARY OSTEOARTHRITIS OF RIGHT SHOULDER: ICD-10-CM

## 2019-05-23 DIAGNOSIS — R01.1 MURMUR: ICD-10-CM

## 2019-05-23 LAB
CHOLEST SERPL-MCNC: 184 MG/DL (ref 50–200)
HDLC SERPL-MCNC: 74 MG/DL (ref 40–60)
LDLC SERPL CALC-MCNC: 103 MG/DL (ref 0–100)
TRIGL SERPL-MCNC: 37 MG/DL
TSH SERPL DL<=0.05 MIU/L-ACNC: 2.08 UIU/ML (ref 0.36–3.74)

## 2019-05-23 PROCEDURE — 80061 LIPID PANEL: CPT

## 2019-05-23 PROCEDURE — 36415 COLL VENOUS BLD VENIPUNCTURE: CPT

## 2019-05-23 PROCEDURE — 84443 ASSAY THYROID STIM HORMONE: CPT

## 2019-05-23 RX ORDER — LEVOTHYROXINE SODIUM 0.03 MG/1
25 TABLET ORAL DAILY
Qty: 150 TABLET | Refills: 0 | Status: SHIPPED | OUTPATIENT
Start: 2019-05-23 | End: 2019-11-05 | Stop reason: SDUPTHER

## 2019-05-23 RX ORDER — MELOXICAM 7.5 MG/1
7.5 TABLET ORAL DAILY
Qty: 150 TABLET | Refills: 0 | Status: SHIPPED | OUTPATIENT
Start: 2019-05-23 | End: 2019-11-05

## 2019-06-01 ENCOUNTER — TELEPHONE (OUTPATIENT)
Dept: FAMILY MEDICINE CLINIC | Facility: CLINIC | Age: 74
End: 2019-06-01

## 2019-06-01 ENCOUNTER — HOSPITAL ENCOUNTER (EMERGENCY)
Facility: HOSPITAL | Age: 74
Discharge: HOME | End: 2019-06-01
Attending: EMERGENCY MEDICINE | Admitting: EMERGENCY MEDICINE
Payer: MEDICARE

## 2019-06-01 ENCOUNTER — APPOINTMENT (EMERGENCY)
Dept: RADIOLOGY | Facility: HOSPITAL | Age: 74
End: 2019-06-01
Payer: MEDICARE

## 2019-06-01 VITALS
HEIGHT: 64 IN | SYSTOLIC BLOOD PRESSURE: 165 MMHG | HEART RATE: 51 BPM | BODY MASS INDEX: 27.66 KG/M2 | OXYGEN SATURATION: 98 % | DIASTOLIC BLOOD PRESSURE: 74 MMHG | RESPIRATION RATE: 18 BRPM | WEIGHT: 162 LBS | TEMPERATURE: 98 F

## 2019-06-01 DIAGNOSIS — R07.9 CHEST PAIN: Primary | ICD-10-CM

## 2019-06-01 LAB
ALBUMIN SERPL BCP-MCNC: 3.6 G/DL (ref 3.5–5)
ALP SERPL-CCNC: 65 U/L (ref 46–116)
ALT SERPL W P-5'-P-CCNC: 15 U/L (ref 12–78)
ANION GAP SERPL CALCULATED.3IONS-SCNC: 8 MMOL/L (ref 4–13)
AST SERPL W P-5'-P-CCNC: 11 U/L (ref 5–45)
BASOPHILS # BLD AUTO: 0.01 THOUSANDS/ΜL (ref 0–0.1)
BASOPHILS NFR BLD AUTO: 0 % (ref 0–1)
BILIRUB SERPL-MCNC: 0.6 MG/DL (ref 0.2–1)
BUN SERPL-MCNC: 20 MG/DL (ref 5–25)
CALCIUM SERPL-MCNC: 9.7 MG/DL (ref 8.3–10.1)
CHLORIDE SERPL-SCNC: 104 MMOL/L (ref 100–108)
CO2 SERPL-SCNC: 29 MMOL/L (ref 21–32)
CREAT SERPL-MCNC: 1.08 MG/DL (ref 0.6–1.3)
EOSINOPHIL # BLD AUTO: 0.01 THOUSAND/ΜL (ref 0–0.61)
EOSINOPHIL NFR BLD AUTO: 0 % (ref 0–6)
ERYTHROCYTE [DISTWIDTH] IN BLOOD BY AUTOMATED COUNT: 13.2 % (ref 11.6–15.1)
GFR SERPL CREATININE-BSD FRML MDRD: 60 ML/MIN/1.73SQ M
GLUCOSE SERPL-MCNC: 153 MG/DL (ref 65–140)
HCT VFR BLD AUTO: 40.3 % (ref 34.8–46.1)
HGB BLD-MCNC: 13.5 G/DL (ref 11.5–15.4)
IMM GRANULOCYTES # BLD AUTO: 0.01 THOUSAND/UL (ref 0–0.2)
IMM GRANULOCYTES NFR BLD AUTO: 0 % (ref 0–2)
LYMPHOCYTES # BLD AUTO: 1.7 THOUSANDS/ΜL (ref 0.6–4.47)
LYMPHOCYTES NFR BLD AUTO: 45 % (ref 14–44)
MCH RBC QN AUTO: 28.8 PG (ref 26.8–34.3)
MCHC RBC AUTO-ENTMCNC: 33.5 G/DL (ref 31.4–37.4)
MCV RBC AUTO: 86 FL (ref 82–98)
MONOCYTES # BLD AUTO: 0.2 THOUSAND/ΜL (ref 0.17–1.22)
MONOCYTES NFR BLD AUTO: 5 % (ref 4–12)
NEUTROPHILS # BLD AUTO: 1.83 THOUSANDS/ΜL (ref 1.85–7.62)
NEUTS SEG NFR BLD AUTO: 50 % (ref 43–75)
NRBC BLD AUTO-RTO: 0 /100 WBCS
PLATELET # BLD AUTO: 209 THOUSANDS/UL (ref 149–390)
PMV BLD AUTO: 9.6 FL (ref 8.9–12.7)
POTASSIUM SERPL-SCNC: 3.8 MMOL/L (ref 3.5–5.3)
PROT SERPL-MCNC: 7.4 G/DL (ref 6.4–8.2)
RBC # BLD AUTO: 4.69 MILLION/UL (ref 3.81–5.12)
SODIUM SERPL-SCNC: 141 MMOL/L (ref 136–145)
TROPONIN I SERPL-MCNC: <0.02 NG/ML
TROPONIN I SERPL-MCNC: <0.02 NG/ML
WBC # BLD AUTO: 3.76 THOUSAND/UL (ref 4.31–10.16)

## 2019-06-01 PROCEDURE — 80053 COMPREHEN METABOLIC PANEL: CPT | Performed by: EMERGENCY MEDICINE

## 2019-06-01 PROCEDURE — 71046 X-RAY EXAM CHEST 2 VIEWS: CPT

## 2019-06-01 PROCEDURE — 85025 COMPLETE CBC W/AUTO DIFF WBC: CPT | Performed by: EMERGENCY MEDICINE

## 2019-06-01 PROCEDURE — 84484 ASSAY OF TROPONIN QUANT: CPT | Performed by: EMERGENCY MEDICINE

## 2019-06-01 PROCEDURE — 36415 COLL VENOUS BLD VENIPUNCTURE: CPT | Performed by: EMERGENCY MEDICINE

## 2019-06-01 PROCEDURE — 93005 ELECTROCARDIOGRAM TRACING: CPT

## 2019-06-01 PROCEDURE — 99285 EMERGENCY DEPT VISIT HI MDM: CPT

## 2019-06-04 ENCOUNTER — OFFICE VISIT (OUTPATIENT)
Dept: FAMILY MEDICINE CLINIC | Facility: CLINIC | Age: 74
End: 2019-06-04
Payer: MEDICARE

## 2019-06-04 VITALS
HEIGHT: 64 IN | SYSTOLIC BLOOD PRESSURE: 124 MMHG | TEMPERATURE: 98.4 F | RESPIRATION RATE: 16 BRPM | WEIGHT: 167 LBS | HEART RATE: 72 BPM | DIASTOLIC BLOOD PRESSURE: 70 MMHG | BODY MASS INDEX: 28.51 KG/M2

## 2019-06-04 DIAGNOSIS — M79.601 RIGHT ARM PAIN: Primary | ICD-10-CM

## 2019-06-04 DIAGNOSIS — E03.9 ACQUIRED HYPOTHYROIDISM: ICD-10-CM

## 2019-06-04 DIAGNOSIS — R01.1 MURMUR: ICD-10-CM

## 2019-06-04 PROCEDURE — 99213 OFFICE O/P EST LOW 20 MIN: CPT | Performed by: NURSE PRACTITIONER

## 2019-06-05 LAB
ATRIAL RATE: 60 BPM
P AXIS: 53 DEGREES
PR INTERVAL: 148 MS
QRS AXIS: -16 DEGREES
QRSD INTERVAL: 86 MS
QT INTERVAL: 418 MS
QTC INTERVAL: 418 MS
T WAVE AXIS: 53 DEGREES
VENTRICULAR RATE: 60 BPM

## 2019-06-05 PROCEDURE — 93010 ELECTROCARDIOGRAM REPORT: CPT | Performed by: INTERNAL MEDICINE

## 2019-11-05 ENCOUNTER — OFFICE VISIT (OUTPATIENT)
Dept: FAMILY MEDICINE CLINIC | Facility: CLINIC | Age: 74
End: 2019-11-05
Payer: MEDICARE

## 2019-11-05 VITALS
WEIGHT: 163 LBS | RESPIRATION RATE: 16 BRPM | BODY MASS INDEX: 27.83 KG/M2 | HEART RATE: 68 BPM | SYSTOLIC BLOOD PRESSURE: 134 MMHG | TEMPERATURE: 97.8 F | DIASTOLIC BLOOD PRESSURE: 72 MMHG | HEIGHT: 64 IN

## 2019-11-05 DIAGNOSIS — E03.9 ACQUIRED HYPOTHYROIDISM: Primary | ICD-10-CM

## 2019-11-05 DIAGNOSIS — I27.20 PULMONARY HYPERTENSION (HCC): ICD-10-CM

## 2019-11-05 DIAGNOSIS — Z23 NEED FOR VACCINATION: ICD-10-CM

## 2019-11-05 DIAGNOSIS — R01.1 MURMUR: ICD-10-CM

## 2019-11-05 PROBLEM — M25.50 ARTHRALGIA: Status: ACTIVE | Noted: 2019-11-05

## 2019-11-05 PROCEDURE — 99214 OFFICE O/P EST MOD 30 MIN: CPT | Performed by: NURSE PRACTITIONER

## 2019-11-05 PROCEDURE — 90662 IIV NO PRSV INCREASED AG IM: CPT

## 2019-11-05 PROCEDURE — G0008 ADMIN INFLUENZA VIRUS VAC: HCPCS

## 2019-11-05 RX ORDER — LEVOTHYROXINE SODIUM 0.03 MG/1
25 TABLET ORAL DAILY
Qty: 90 TABLET | Refills: 1 | Status: SHIPPED | OUTPATIENT
Start: 2019-11-05 | End: 2020-09-23 | Stop reason: SDUPTHER

## 2019-11-05 NOTE — PROGRESS NOTES
Assessment/Plan:    Arthralgias consistent with osteoarthritis  Encouraged routine weight bearing exercise and weight loss  1  Acquired hypothyroidism  Assessment & Plan:  Was previously stable on Levothyroxine 25mcg  Will resume the same and recheck labs in 6 months  Orders:  -     levothyroxine 25 mcg tablet; Take 1 tablet (25 mcg total) by mouth daily  -     TSH, 3rd generation; Future; Expected date: 05/05/2020  -     T4, free; Future; Expected date: 05/05/2020  -     Basic metabolic panel; Future; Expected date: 05/05/2020    2  Need for vaccination  -     influenza vaccine, 5281-0144, high-dose, PF 0 5 mL (FLUZONE HIGH-DOSE)    3  Murmur  Assessment & Plan:  Echo done April 2019      4  Pulmonary hypertension ([address] )  Assessment & Plan:  Mild, as seen on 2D echo  She is asymptomatic  Will continue to monitor  There are no Patient Instructions on file for this visit  Return in about 6 months (around 5/5/2020) for Medicare Annual Wellness Visit  Subjective:      Patient ID: Shaun Grande is a 72 y o  female  Chief Complaint   Patient presents with    Generalized Body Aches     --lj       Here today for 6 month follow up and med check  She was has been doing well on Levothyroxine, however [place]  Requesting refill today  Also with complaints of generalized aches and pains  Notices this mostly when she has been sitting for prolonged periods and feels better when she is up and moving  Feels this has improved since resuming her Osteobioflex supplement  No specific joint pain or swelling  Due for flu shot today  Has not yet scheduled her DXA scan  Labs up to date         The following portions of the patient's history were reviewed and updated as appropriate: allergies, current medications, past family history, past medical history, past social history, past surgical history and problem list     Review of Systems   Constitutional: Negative for chills, fatigue and fever  Respiratory: Negative for cough, shortness of breath and wheezing  Cardiovascular: Negative for chest pain, palpitations and leg swelling  Gastrointestinal: Negative for abdominal pain, diarrhea, nausea and vomiting  Musculoskeletal: Positive for arthralgias  Skin: Negative for rash  Neurological: Negative for dizziness and headaches  Current Outpatient Medications   Medication Sig Dispense Refill    calcium carbonate 1250 MG capsule Take 1,250 mg by mouth 2 (two) times a day with meals   glucosamine-chondroitin 500-400 MG tablet Take 1 tablet by mouth 3 (three) times a day   levothyroxine 25 mcg tablet Take 1 tablet (25 mcg total) by mouth daily 90 tablet 1    Multiple Vitamin (MULTI-DAY PO) Take 1 tablet by mouth daily       No current facility-administered medications for this visit  Objective:    /72   Pulse 68   Temp 97 8 °F (36 6 °C)   Resp 16   Ht 5' 4" (1 626 m)   Wt 73 9 kg (163 lb)   BMI 27 98 kg/m²        Physical Exam   Constitutional: She appears well-developed and well-nourished  HENT:   Head: Normocephalic and atraumatic  Right Ear: Tympanic membrane, external ear and ear canal normal    Left Ear: Tympanic membrane, external ear and ear canal normal    Nose: No mucosal edema or rhinorrhea  Mouth/Throat: Uvula is midline, oropharynx is clear and moist and mucous membranes are normal    Eyes: Conjunctivae are normal    Neck: Neck supple  No edema present  No thyromegaly present  Cardiovascular: Normal rate, regular rhythm and intact distal pulses  Murmur heard  Pulmonary/Chest: Effort normal and breath sounds normal    Abdominal: Bowel sounds are normal  She exhibits no distension  There is no splenomegaly or hepatomegaly  There is no tenderness  Lymphadenopathy:        Right cervical: No superficial cervical adenopathy present  Left cervical: No superficial cervical adenopathy present     Skin: Skin is warm, dry and intact  No rash noted  Psychiatric: She has a normal mood and affect  Nursing note and vitals reviewed               Donna Costa

## 2020-02-04 ENCOUNTER — HOSPITAL ENCOUNTER (OUTPATIENT)
Dept: RADIOLOGY | Facility: HOSPITAL | Age: 75
Discharge: HOME | End: 2020-02-04
Payer: MEDICARE

## 2020-02-04 DIAGNOSIS — Z13.820 SCREENING FOR OSTEOPOROSIS: ICD-10-CM

## 2020-02-04 PROCEDURE — 77080 DXA BONE DENSITY AXIAL: CPT

## 2020-07-24 ENCOUNTER — OFFICE VISIT (OUTPATIENT)
Dept: FAMILY MEDICINE CLINIC | Facility: CLINIC | Age: 75
End: 2020-07-24
Payer: MEDICARE

## 2020-07-24 VITALS
DIASTOLIC BLOOD PRESSURE: 60 MMHG | WEIGHT: 161 LBS | HEART RATE: 49 BPM | BODY MASS INDEX: 27.49 KG/M2 | RESPIRATION RATE: 16 BRPM | TEMPERATURE: 98.1 F | SYSTOLIC BLOOD PRESSURE: 120 MMHG | OXYGEN SATURATION: 98 % | HEIGHT: 64 IN

## 2020-07-24 DIAGNOSIS — Z00.00 MEDICARE ANNUAL WELLNESS VISIT, SUBSEQUENT: Primary | ICD-10-CM

## 2020-07-24 DIAGNOSIS — E03.9 ACQUIRED HYPOTHYROIDISM: ICD-10-CM

## 2020-07-24 DIAGNOSIS — Z12.39 SCREENING FOR BREAST CANCER: ICD-10-CM

## 2020-07-24 DIAGNOSIS — Z12.4 SCREENING FOR CERVICAL CANCER: ICD-10-CM

## 2020-07-24 DIAGNOSIS — R92.2 DENSE BREAST TISSUE: ICD-10-CM

## 2020-07-24 DIAGNOSIS — Z13.6 SCREENING FOR CARDIOVASCULAR CONDITION: ICD-10-CM

## 2020-07-24 DIAGNOSIS — I27.20 PULMONARY HYPERTENSION (HCC): ICD-10-CM

## 2020-07-24 PROCEDURE — G0439 PPPS, SUBSEQ VISIT: HCPCS | Performed by: FAMILY MEDICINE

## 2020-07-24 PROCEDURE — 1160F RVW MEDS BY RX/DR IN RCRD: CPT | Performed by: FAMILY MEDICINE

## 2020-07-24 PROCEDURE — 4040F PNEUMOC VAC/ADMIN/RCVD: CPT | Performed by: FAMILY MEDICINE

## 2020-07-24 PROCEDURE — 1123F ACP DISCUSS/DSCN MKR DOCD: CPT | Performed by: FAMILY MEDICINE

## 2020-07-24 PROCEDURE — 1036F TOBACCO NON-USER: CPT | Performed by: FAMILY MEDICINE

## 2020-07-24 PROCEDURE — 1170F FXNL STATUS ASSESSED: CPT | Performed by: FAMILY MEDICINE

## 2020-07-24 PROCEDURE — 1125F AMNT PAIN NOTED PAIN PRSNT: CPT | Performed by: FAMILY MEDICINE

## 2020-07-24 PROCEDURE — 3008F BODY MASS INDEX DOCD: CPT | Performed by: FAMILY MEDICINE

## 2020-07-24 RX ORDER — PROPRANOLOL/HYDROCHLOROTHIAZID 40 MG-25MG
500 TABLET ORAL DAILY
COMMUNITY

## 2020-07-24 NOTE — PATIENT INSTRUCTIONS
Medicare Preventive Visit Patient Instructions  Thank you for completing your Welcome to Medicare Visit or Medicare Annual Wellness Visit today  Your next wellness visit will be due in one year (7/24/2021)  The screening/preventive services that you may require over the next 5-10 years are detailed below  Some tests may not apply to you based off risk factors and/or age  Screening tests ordered at today's visit but not completed yet may show as past due  Also, please note that scanned in results may not display below  Preventive Screenings:  Service Recommendations Previous Testing/Comments   Colorectal Cancer Screening  * Colonoscopy    * Fecal Occult Blood Test (FOBT)/Fecal Immunochemical Test (FIT)  * Fecal DNA/Cologuard Test  * Flexible Sigmoidoscopy Age: 54-65 years old   Colonoscopy: every 10 years (may be performed more frequently if at higher risk)  OR  FOBT/FIT: every 1 year  OR  Cologuard: every 3 years  OR  Sigmoidoscopy: every 5 years  Screening may be recommended earlier than age 48 if at higher risk for colorectal cancer  Also, an individualized decision between you and your healthcare provider will decide whether screening between the ages of 74-80 would be appropriate  Colonoscopy: 05/09/2016  FOBT/FIT: Not on file  Cologuard: Not on file  Sigmoidoscopy: Not on file    Screening Current     Breast Cancer Screening Age: 36 years old  Frequency: every 1-2 years  Not required if history of left and right mastectomy Mammogram: 04/16/2019    Screening Current   Cervical Cancer Screening Between the ages of 21-29, pap smear recommended once every 3 years  Between the ages of 33-67, can perform pap smear with HPV co-testing every 5 years     Recommendations may differ for women with a history of total hysterectomy, cervical cancer, or abnormal pap smears in past  Pap Smear: Not on file    Screening Not Indicated   Hepatitis C Screening Once for adults born between 1945 and 1965  More frequently in patients at high risk for Hepatitis C Hep C Antibody: 04/03/2019    Screening Current   Diabetes Screening 1-2 times per year if you're at risk for diabetes or have pre-diabetes Fasting glucose: 90 mg/dL   A1C: No results in last 5 years       Cholesterol Screening Once every 5 years if you don't have a lipid disorder  May order more often based on risk factors  Lipid panel: 05/23/2019    Screening Current     Other Preventive Screenings Covered by Medicare:  1  Abdominal Aortic Aneurysm (AAA) Screening: covered once if your at risk  You're considered to be at risk if you have a family history of AAA  2  Lung Cancer Screening: covers low dose CT scan once per year if you meet all of the following conditions: (1) Age 50-69; (2) No signs or symptoms of lung cancer; (3) Current smoker or have quit smoking within the last 15 years; (4) You have a tobacco smoking history of at least 30 pack years (packs per day multiplied by number of years you smoked); (5) You get a written order from a healthcare provider  3  Glaucoma Screening: covered annually if you're considered high risk: (1) You have diabetes OR (2) Family history of glaucoma OR (3)  aged 48 and older OR (3)  American aged 72 and older  3  Osteoporosis Screening: covered every 2 years if you meet one of the following conditions: (1) You're estrogen deficient and at risk for osteoporosis based off medical history and other findings; (2) Have a vertebral abnormality; (3) On glucocorticoid therapy for more than 3 months; (4) Have primary hyperparathyroidism; (5) On osteoporosis medications and need to assess response to drug therapy  · Last bone density test (DXA Scan): 02/04/2020   5  HIV Screening: covered annually if you're between the age of 15-65  Also covered annually if you are younger than 13 and older than 72 with risk factors for HIV infection   For pregnant patients, it is covered up to 3 times per pregnancy  Immunizations:  Immunization Recommendations   Influenza Vaccine Annual influenza vaccination during flu season is recommended for all persons aged >= 6 months who do not have contraindications   Pneumococcal Vaccine (Prevnar and Pneumovax)  * Prevnar = PCV13  * Pneumovax = PPSV23   Adults 25-60 years old: 1-3 doses may be recommended based on certain risk factors  Adults 72 years old: Prevnar (PCV13) vaccine recommended followed by Pneumovax (PPSV23) vaccine  If already received PPSV23 since turning 65, then PCV13 recommended at least one year after PPSV23 dose  Hepatitis B Vaccine 3 dose series if at intermediate or high risk (ex: diabetes, end stage renal disease, liver disease)   Tetanus (Td) Vaccine - COST NOT COVERED BY MEDICARE PART B Following completion of primary series, a booster dose should be given every 10 years to maintain immunity against tetanus  Td may also be given as tetanus wound prophylaxis  Tdap Vaccine - COST NOT COVERED BY MEDICARE PART B Recommended at least once for all adults  For pregnant patients, recommended with each pregnancy  Shingles Vaccine (Shingrix) - COST NOT COVERED BY MEDICARE PART B  2 shot series recommended in those aged 48 and above     Health Maintenance Due:      Topic Date Due    CRC Screening: Colonoscopy  05/09/2021    DXA SCAN  02/04/2022    Hepatitis C Screening  Completed     Immunizations Due:      Topic Date Due    DTaP,Tdap,and Td Vaccines (1 - Tdap) [birth year]/1958    Influenza Vaccine  07/01/2020     Advance Directives   What are advance directives? Advance directives are legal documents that state your wishes and plans for medical care  These plans are made ahead of time in case you lose your ability to make decisions for yourself  Advance directives can apply to any medical decision, such as the treatments you want, and if you want to donate organs  What are the types of advance directives?   There are many types of advance directives, and each state has rules about how to use them  You may choose a combination of any of the following:  · Living will: This is a written record of the treatment you want  You can also choose which treatments you do not want, which to limit, and which to stop at a certain time  This includes surgery, medicine, IV fluid, and tube feedings  · Durable power of  for healthcare [facility]): This is a written record that states who you want to make healthcare choices for you when you are unable to make them for yourself  This person, called a proxy, is usually a family member or a friend  You may choose more than 1 proxy  · Do not resuscitate (DNR) order:  A DNR order is used in case your heart stops beating or you stop breathing  It is a request not to have certain forms of treatment, such as CPR  A DNR order may be included in other types of advance directives  · Medical directive: This covers the care that you want if you are in a coma, near death, or unable to make decisions for yourself  You can list the treatments you want for each condition  Treatment may include pain medicine, surgery, blood transfusions, dialysis, IV or tube feedings, and a ventilator (breathing machine)  · Values history: This document has questions about your views, beliefs, and how you feel and think about life  This information can help others choose the care that you would choose  Why are advance directives important? An advance directive helps you control your care  Although spoken wishes may be used, it is better to have your wishes written down  Spoken wishes can be misunderstood, or not followed  Treatments may be given even if you do not want them  An advance directive may make it easier for your family to make difficult choices about your care     Weight Management   Why it is important to manage your weight:  Being overweight increases your risk of health conditions such as heart disease, high blood pressure, type 2 diabetes, and certain types of cancer  It can also increase your risk for osteoarthritis, sleep apnea, and other respiratory problems  Aim for a slow, steady weight loss  Even a small amount of weight loss can lower your risk of health problems  How to lose weight safely:  A safe and healthy way to lose weight is to eat fewer calories and get regular exercise  You can lose up about 1 pound a week by decreasing the number of calories you eat by 500 calories each day  Healthy meal plan for weight management:  A healthy meal plan includes a variety of foods, contains fewer calories, and helps you stay healthy  A healthy meal plan includes the following:  · Eat whole-grain foods more often  A healthy meal plan should contain fiber  Fiber is the part of grains, fruits, and vegetables that is not broken down by your body  Whole-grain foods are healthy and provide extra fiber in your diet  Some examples of whole-grain foods are whole-wheat breads and pastas, oatmeal, brown rice, and bulgur  · Eat a variety of vegetables every day  Include dark, leafy greens such as spinach, kale, mehran greens, and mustard greens  Eat yellow and orange vegetables such as carrots, sweet potatoes, and winter squash  · Eat a variety of fruits every day  Choose fresh or canned fruit (canned in its own juice or light syrup) instead of juice  Fruit juice has very little or no fiber  · Eat low-fat dairy foods  Drink fat-free (skim) milk or 1% milk  Eat fat-free yogurt and low-fat cottage cheese  Try low-fat cheeses such as mozzarella and other reduced-fat cheeses  · Choose meat and other protein foods that are low in fat  Choose beans or other legumes such as split peas or lentils  Choose fish, skinless poultry (chicken or turkey), or lean cuts of red meat (beef or pork)  Before you cook meat or poultry, cut off any visible fat  · Use less fat and oil  Try baking foods instead of frying them   Add less fat, such as margarine, sour cream, regular salad dressing and mayonnaise to foods  Eat fewer high-fat foods  Some examples of high-fat foods include french fries, doughnuts, ice cream, and cakes  · Eat fewer sweets  Limit foods and drinks that are high in sugar  This includes candy, cookies, regular soda, and sweetened drinks  Exercise:  Exercise at least 30 minutes per day on most days of the week  Some examples of exercise include walking, biking, dancing, and swimming  You can also fit in more physical activity by taking the stairs instead of the elevator or parking farther away from stores  Ask your healthcare provider about the best exercise plan for you  © Copyright [employer] 2018 Information is for End User's use only and may not be sold, redistributed or otherwise used for commercial purposes  All illustrations and images included in CareNotes® are the copyrighted property of A D A M , Inc  or [facility] Poornima Alvarado     Weight Management   AMBULATORY CARE:   Why it is important to manage your weight:  Being overweight increases your risk of health conditions such as heart disease, high blood pressure, type 2 diabetes, and certain types of cancer  It can also increase your risk for osteoarthritis, sleep apnea, and other respiratory problems  Aim for a slow, steady weight loss  Even a small amount of weight loss can lower your risk of health problems  How to lose weight safely:  A safe and healthy way to lose weight is to eat fewer calories and get regular exercise  You can lose up about 1 pound a week by decreasing the number of calories you eat by 500 calories each day  You can decrease calories by eating smaller portion sizes or by cutting out high-calorie foods  Read labels to find out how many calories are in the foods you eat  You can also burn calories with exercise such as walking, swimming, or biking  You will be more likely to keep weight off if you make these changes part of your lifestyle     Healthy meal plan for weight management:  A healthy meal plan includes a variety of foods, contains fewer calories, and helps you stay healthy  A healthy meal plan includes the following:  · Eat whole-grain foods more often  A healthy meal plan should contain fiber  Fiber is the part of grains, fruits, and vegetables that is not broken down by your body  Whole-grain foods are healthy and provide extra fiber in your diet  Some examples of whole-grain foods are whole-wheat breads and pastas, oatmeal, brown rice, and bulgur  · Eat a variety of vegetables every day  Include dark, leafy greens such as spinach, kale, mehran greens, and mustard greens  Eat yellow and orange vegetables such as carrots, sweet potatoes, and winter squash  · Eat a variety of fruits every day  Choose fresh or canned fruit (canned in its own juice or light syrup) instead of juice  Fruit juice has very little or no fiber  · Eat low-fat dairy foods  Drink fat-free (skim) milk or 1% milk  Eat fat-free yogurt and low-fat cottage cheese  Try low-fat cheeses such as mozzarella and other reduced-fat cheeses  · Choose meat and other protein foods that are low in fat  Choose beans or other legumes such as split peas or lentils  Choose fish, skinless poultry (chicken or turkey), or lean cuts of red meat (beef or pork)  Before you cook meat or poultry, cut off any visible fat  · Use less fat and oil  Try baking foods instead of frying them  Add less fat, such as margarine, sour cream, regular salad dressing and mayonnaise to foods  Eat fewer high-fat foods  Some examples of high-fat foods include french fries, doughnuts, ice cream, and cakes  · Eat fewer sweets  Limit foods and drinks that are high in sugar  This includes candy, cookies, regular soda, and sweetened drinks  Ways to decrease calories:   · Eat smaller portions  ¨ Use a small plate with smaller servings  ¨ Do not eat second helpings      ¨ When you eat at a restaurant, ask for a box and place half of your meal in the box before you eat  ¨ Share an entrée with someone else  · Replace high-calorie snacks with healthy, low-calorie snacks  ¨ Choose fresh fruit, vegetables, fat-free rice cakes, or air-popped popcorn instead of potato chips, nuts, or chocolate  ¨ Choose water or calorie-free drinks instead of soda or sweetened drinks  · Eat regular meals  Skipping meals can lead to overeating later in the day  Eat a healthy snack in place of a meal if you do not have time to eat a regular meal      · Do not shop for groceries when you are hungry  You may be more likely to make unhealthy food choices  Take a grocery list of healthy foods and shop after you have eaten  Exercise:  Exercise at least 30 minutes per day on most days of the week  Some examples of exercise include walking, biking, dancing, and swimming  You can also fit in more physical activity by taking the stairs instead of the elevator or parking farther away from stores  Ask your healthcare provider about the best exercise plan for you  Other things to consider as you try to lose weight:   · Be aware of situations that may give you the urge to overeat, such as eating while watching television  Find ways to avoid these situations  For example, read a book, go for a walk, or do crafts  · Meet with a weight loss support group or friends who are also trying to lose weight  This may help you stay motivated to continue working on your weight loss goals  © 2017 [address] Information is for End User's use only and may not be sold, redistributed or otherwise used for commercial purposes  All illustrations and images included in CareNotes® are the copyrighted property of [employer]  or Vlad Smith  The above information is an  only  It is not intended as medical advice for individual conditions or treatments   Talk to your doctor, nurse or pharmacist before following any medical regimen to see if it is safe and effective for you

## 2020-07-24 NOTE — PROGRESS NOTES
Assessment and Plan:     Problem List Items Addressed This Visit        Endocrine    Acquired hypothyroidism    Relevant Orders    TSH, 3rd generation    T4, free       Cardiovascular and Mediastinum    Pulmonary hypertension ([address] )       Other    Dense breast tissue    Relevant Orders    Mammo screening bilateral w 3d & cad      Other Visit Diagnoses     Medicare annual wellness visit, subsequent    -  Primary    Screening for cardiovascular condition        Relevant Orders    Comprehensive metabolic panel    Lipid Panel with Direct LDL reflex    BMI 27 0-27 9,adult        Screening for cervical cancer        Relevant Orders    Ambulatory referral to Obstetrics / Gynecology    Screening for breast cancer        Relevant Orders    Mammo screening bilateral w 3d & cad           Preventive health issues were discussed with patient, and age appropriate screening tests were ordered as noted in patient's After Visit Summary  Personalized health advice and appropriate referrals for health education or preventive services given if needed, as noted in patient's After Visit Summary       History of Present Illness:     Patient presents for Medicare Annual Wellness visit    Patient Care Team:  Anatoly Mendoza DO as PCP - General (Family Medicine)  Ryan Kyle MD as Consulting Physician (Ophthalmology)  Kamron Arias MD as Consulting Physician (Gastroenterology)     Problem List:     Patient Active Problem List   Diagnosis    Abnormal mammogram of right breast    Dense breast tissue    Murmur    Acquired hypothyroidism    Primary osteoarthritis of right knee    Primary osteoarthritis of right shoulder    Pulmonary hypertension ([address] )    Arthralgia      Past Medical and Surgical History:     Past Medical History:   Diagnosis Date    Leukocytosis     last assessed: 10/21/2016     Past Surgical History:   Procedure Laterality Date     SECTION      5 c sec    COLONOSCOPY N/A 2016    Procedure: COLONOSCOPY; Surgeon: Gisela Weinstein MD;  Location: [facility]; Service:       Family History:     Family History   Problem Relation Age of Onset    Arthritis Mother         Knee    Hypertension Mother     Heart disease Mother         Pacemaker Placement    Hypertension Father     Heart disease Brother     Mental illness Neg Hx       Social History:        Social History     Socioeconomic History    Marital status: /Civil Union     Spouse name: None    Number of children: None    Years of education: None    Highest education level: None   Occupational History    None   Social Needs    Financial resource strain: None    Food insecurity:     Worry: None     Inability: None    Transportation needs:     Medical: None     Non-medical: None   Tobacco Use    Smoking status: Never Smoker    Smokeless tobacco: Former User   Substance and Sexual Activity    Alcohol use: No    Drug use: No    Sexual activity: None   Lifestyle    Physical activity:     Days per week: None     Minutes per session: None    Stress: None   Relationships    Social connections:     Talks on phone: None     Gets together: None     Attends [employer] witness service: None     Active member of club or organization: None     Attends meetings of clubs or organizations: None     Relationship status: None    Intimate partner violence:     Fear of current or ex partner: None     Emotionally abused: None     Physically abused: None     Forced sexual activity: None   Other Topics Concern    None   Social History Narrative    None      Medications and Allergies:     Current Outpatient Medications   Medication Sig Dispense Refill    calcium carbonate 1250 MG capsule Take 1,250 mg by mouth 2 (two) times a day with meals        levothyroxine 25 mcg tablet Take 1 tablet (25 mcg total) by mouth daily 90 tablet 1    Multiple Vitamin (MULTI-DAY PO) Take 1 tablet by mouth daily      Turmeric 500 MG CAPS Take 500 mg by mouth      glucosamine-chondroitin 500-400 MG tablet Take 1 tablet by mouth 3 (three) times a day  No current facility-administered medications for this visit  No Known Allergies   Immunizations:     Immunization History   Administered Date(s) Administered    Influenza Split High Dose Preservative Free IM 10/09/2015    Influenza, high dose seasonal 0 5 mL 11/05/2019    Pneumococcal Conjugate 13-Valent 09/23/2015    Pneumococcal Polysaccharide PPV23 04/02/2019    TD (adult) Preservative Free 04/02/2019      Health Maintenance:         Topic Date Due    CRC Screening: Colonoscopy  05/09/2021    DXA SCAN  02/04/2022    Hepatitis C Screening  Completed         Topic Date Due    DTaP,Tdap,and Td Vaccines (1 - Tdap) 12/04/1958    Influenza Vaccine  07/01/2020      Medicare Health Risk Assessment:     /60   Pulse (!) 49   Temp 98 1 °F (36 7 °C)   Resp 16   Ht 5' 4" (1 626 m)   Wt 73 kg (161 lb)   SpO2 98%   BMI 27 64 kg/m²      Keny Rodriguez is here for her Subsequent Wellness visit  Last Medicare Wellness visit information reviewed, patient interviewed, no change since last AWV  Health Risk Assessment:   Patient rates overall health as good  Patient feels that their physical health rating is same  Eyesight was rated as same  Hearing was rated as same  Patient feels that their emotional and mental health rating is same  Pain experienced in the last 7 days has been some  Patient's pain rating has been 4/10  Patient states that she has experienced no weight loss or gain in last 6 months  Depression Screening:   PHQ-2 Score: 0      Fall Risk Screening: In the past year, patient has experienced: no history of falling in past year      Urinary Incontinence Screening:   Patient has not leaked urine accidently in the last six months  Home Safety:  Patient does not have trouble with stairs inside or outside of their home  Patient has working smoke alarms and has working carbon monoxide detector   Home safety hazards include: none      Nutrition:   Current diet is Regular and Limited junk food  Medications:   Patient is currently taking over-the-counter supplements  OTC medications include: see medication list  Patient is able to manage medications  Activities of Daily Living (ADLs)/Instrumental Activities of Daily Living (IADLs):   Walk and transfer into and out of bed and chair?: Yes  Dress and groom yourself?: Yes    Bathe or shower yourself?: Yes    Feed yourself? Yes  Do your laundry/housekeeping?: Yes  Manage your money, pay your bills and track your expenses?: Yes  Make your own meals?: Yes    Do your own shopping?: Yes    Previous Hospitalizations:   Any hospitalizations or ED visits within the last 12 months?: No      Advance Care Planning:   Living will: No      Cognitive Screening:   Provider or family/friend/caregiver concerned regarding cognition?: No    PREVENTIVE SCREENINGS      Cardiovascular Screening:    General: Screening Current and Risks and Benefits Discussed    Due for: Lipid Panel      Diabetes Screening:     General: Risks and Benefits Discussed    Due for: Blood Glucose      Colorectal Cancer Screening:     General: Screening Current      Breast Cancer Screening:     General: Screening Current and Risks and Benefits Discussed    Due for: Mammogram        Cervical Cancer Screening:    General: Screening Not Indicated      Osteoporosis Screening:    General: Screening Current      Abdominal Aortic Aneurysm (AAA) Screening:        General: Screening Not Indicated      Lung Cancer Screening:     General: Screening Not Indicated      Hepatitis C Screening:    General: Screening Current      Rainer Spindle, DO  BMI Counseling: Body mass index is 27 64 kg/m²  The BMI is above normal  Nutrition recommendations include reducing portion sizes

## 2020-09-16 ENCOUNTER — APPOINTMENT (OUTPATIENT)
Dept: LAB | Facility: HOSPITAL | Age: 75
End: 2020-09-16
Attending: FAMILY MEDICINE
Payer: MEDICARE

## 2020-09-16 ENCOUNTER — TRANSCRIBE ORDERS (OUTPATIENT)
Dept: ADMINISTRATIVE | Facility: HOSPITAL | Age: 75
End: 2020-09-16

## 2020-09-16 DIAGNOSIS — Z13.6 SCREENING FOR CARDIOVASCULAR CONDITION: ICD-10-CM

## 2020-09-16 DIAGNOSIS — E03.9 ACQUIRED HYPOTHYROIDISM: ICD-10-CM

## 2020-09-16 LAB
ALBUMIN SERPL BCP-MCNC: 3.7 G/DL (ref 3.5–5)
ALP SERPL-CCNC: 54 U/L (ref 46–116)
ALT SERPL W P-5'-P-CCNC: 22 U/L (ref 12–78)
ANION GAP SERPL CALCULATED.3IONS-SCNC: 6 MMOL/L (ref 4–13)
AST SERPL W P-5'-P-CCNC: 22 U/L (ref 5–45)
BILIRUB SERPL-MCNC: 0.7 MG/DL (ref 0.2–1)
BUN SERPL-MCNC: 20 MG/DL (ref 5–25)
CALCIUM SERPL-MCNC: 8.9 MG/DL (ref 8.3–10.1)
CHLORIDE SERPL-SCNC: 104 MMOL/L (ref 100–108)
CHOLEST SERPL-MCNC: 196 MG/DL (ref 50–200)
CO2 SERPL-SCNC: 31 MMOL/L (ref 21–32)
CREAT SERPL-MCNC: 1.07 MG/DL (ref 0.6–1.3)
GFR SERPL CREATININE-BSD FRML MDRD: 60 ML/MIN/1.73SQ M
GLUCOSE P FAST SERPL-MCNC: 84 MG/DL (ref 65–99)
HDLC SERPL-MCNC: 84 MG/DL
LDLC SERPL CALC-MCNC: 104 MG/DL (ref 0–100)
POTASSIUM SERPL-SCNC: 4.4 MMOL/L (ref 3.5–5.3)
PROT SERPL-MCNC: 7.7 G/DL (ref 6.4–8.2)
SODIUM SERPL-SCNC: 141 MMOL/L (ref 136–145)
T4 FREE SERPL-MCNC: 0.85 NG/DL (ref 0.76–1.46)
TRIGL SERPL-MCNC: 41 MG/DL
TSH SERPL DL<=0.05 MIU/L-ACNC: 8.46 UIU/ML (ref 0.36–3.74)

## 2020-09-16 PROCEDURE — 84443 ASSAY THYROID STIM HORMONE: CPT

## 2020-09-16 PROCEDURE — 80061 LIPID PANEL: CPT

## 2020-09-16 PROCEDURE — 80053 COMPREHEN METABOLIC PANEL: CPT

## 2020-09-16 PROCEDURE — 36415 COLL VENOUS BLD VENIPUNCTURE: CPT

## 2020-09-16 PROCEDURE — 84439 ASSAY OF FREE THYROXINE: CPT

## 2020-09-17 ENCOUNTER — TELEPHONE (OUTPATIENT)
Dept: FAMILY MEDICINE CLINIC | Facility: CLINIC | Age: 75
End: 2020-09-17

## 2020-09-17 NOTE — TELEPHONE ENCOUNTER
Called pt to discuss results of the TSH - its high I will need to increase her dose and redraw the number in 6 weeks    If she is ok with this I can change the dose and put order in for new lab    Left message for pt to call back

## 2020-09-18 ENCOUNTER — HOSPITAL ENCOUNTER (OUTPATIENT)
Dept: RADIOLOGY | Facility: HOSPITAL | Age: 75
Discharge: HOME | End: 2020-09-18
Attending: FAMILY MEDICINE
Payer: MEDICARE

## 2020-09-18 VITALS — BODY MASS INDEX: 27.31 KG/M2 | WEIGHT: 160 LBS | HEIGHT: 64 IN

## 2020-09-18 DIAGNOSIS — R92.2 DENSE BREAST TISSUE: ICD-10-CM

## 2020-09-18 DIAGNOSIS — Z12.39 SCREENING FOR BREAST CANCER: ICD-10-CM

## 2020-09-18 PROCEDURE — 77067 SCR MAMMO BI INCL CAD: CPT

## 2020-09-18 PROCEDURE — 77063 BREAST TOMOSYNTHESIS BI: CPT

## 2020-09-23 ENCOUNTER — OFFICE VISIT (OUTPATIENT)
Dept: FAMILY MEDICINE CLINIC | Facility: CLINIC | Age: 75
End: 2020-09-23
Payer: MEDICARE

## 2020-09-23 VITALS
HEIGHT: 64 IN | DIASTOLIC BLOOD PRESSURE: 76 MMHG | RESPIRATION RATE: 18 BRPM | WEIGHT: 159 LBS | SYSTOLIC BLOOD PRESSURE: 130 MMHG | TEMPERATURE: 96.4 F | OXYGEN SATURATION: 96 % | HEART RATE: 50 BPM | BODY MASS INDEX: 27.14 KG/M2

## 2020-09-23 DIAGNOSIS — S86.912A KNEE STRAIN, LEFT, INITIAL ENCOUNTER: Primary | ICD-10-CM

## 2020-09-23 DIAGNOSIS — E03.9 ACQUIRED HYPOTHYROIDISM: ICD-10-CM

## 2020-09-23 PROCEDURE — 99213 OFFICE O/P EST LOW 20 MIN: CPT | Performed by: FAMILY MEDICINE

## 2020-09-23 RX ORDER — LEVOTHYROXINE SODIUM 0.03 MG/1
25 TABLET ORAL DAILY
Qty: 90 TABLET | Refills: 1 | Status: SHIPPED | OUTPATIENT
Start: 2020-09-23 | End: 2021-04-06 | Stop reason: SDUPTHER

## 2020-09-23 NOTE — PROGRESS NOTES
Assessment/Plan:    1  Knee strain, left, initial encounter    2  Acquired hypothyroidism  Assessment & Plan:  Pt states she ran out of her levothyroxine and did not get a refill, about 6 months ago  I will reorder her old dose and look at numbers in 6 weeks    Orders:  -     levothyroxine 25 mcg tablet; Take 1 tablet (25 mcg total) by mouth daily  -     TSH, 3rd generation; Future; Expected date: 11/06/2020          There are no Patient Instructions on file for this visit  Return for Next scheduled follow up  Subjective:      Patient ID: Nilsa Aschoff is a 69 y o  female  Chief Complaint   Patient presents with    Leg Pain     left leg pain, patient fell two weeks ago  [id number]       Pt had a slip fall down a carpeted staircase her left foot got caught under her  This was two weeks ago  Motrin has been controlling her pain  Medial aspect of the left knee hurts  Pain was 6-7/10  Pain now is a 1/10  The following portions of the patient's history were reviewed and updated as appropriate: allergies, current medications, past family history, past medical history, past social history, past surgical history and problem list     Review of Systems   Constitutional: Negative  Negative for activity change, appetite change, chills, diaphoresis and fatigue  HENT: Negative  Negative for dental problem, ear pain, sinus pressure and sore throat  Eyes: Negative  Negative for photophobia, pain, discharge, redness, itching and visual disturbance  Respiratory: Negative for apnea and chest tightness  Cardiovascular: Negative  Negative for chest pain, palpitations and leg swelling  Gastrointestinal: Negative  Negative for abdominal distention, abdominal pain, constipation and diarrhea  Endocrine: Negative  Negative for cold intolerance and heat intolerance  Genitourinary: Negative  Negative for difficulty urinating and dyspareunia  Musculoskeletal: Positive for arthralgias   Negative for back pain    Skin: Negative  Allergic/Immunologic: Negative for environmental allergies  Neurological: Negative  Negative for dizziness  Psychiatric/Behavioral: Negative  Negative for agitation  Current Outpatient Medications   Medication Sig Dispense Refill    calcium carbonate 1250 MG capsule Take 1,250 mg by mouth 2 (two) times a day with meals   Multiple Vitamin (MULTI-DAY PO) Take 1 tablet by mouth daily      Turmeric 500 MG CAPS Take 500 mg by mouth      glucosamine-chondroitin 500-400 MG tablet Take 1 tablet by mouth 3 (three) times a day   levothyroxine 25 mcg tablet Take 1 tablet (25 mcg total) by mouth daily 90 tablet 1     No current facility-administered medications for this visit  Objective:    /76   Pulse (!) 50   Temp (!) 96 4 °F (35 8 °C)   Resp 18   Ht 5' 4" (1 626 m)   Wt 72 1 kg (159 lb)   SpO2 96%   BMI 27 29 kg/m²        Physical Exam  Vitals signs and nursing note reviewed  Constitutional:       General: She is not in acute distress  Appearance: She is well-developed  She is not diaphoretic  HENT:      Head: Normocephalic and atraumatic  Right Ear: External ear normal       Left Ear: External ear normal       Nose: Nose normal       Mouth/Throat:      Pharynx: No oropharyngeal exudate  Eyes:      General: No scleral icterus  Right eye: No discharge  Left eye: No discharge  Pupils: Pupils are equal, round, and reactive to light  Neck:      Thyroid: No thyromegaly  Cardiovascular:      Rate and Rhythm: Normal rate  Heart sounds: Normal heart sounds  No murmur  Pulmonary:      Effort: Pulmonary effort is normal  No respiratory distress  Breath sounds: Normal breath sounds  No wheezing  Abdominal:      General: Bowel sounds are normal  There is no distension  Palpations: Abdomen is soft  There is no mass  Tenderness: There is no abdominal tenderness  There is no guarding or rebound  Musculoskeletal: Normal range of motion  Comments: Knee exam does not reveal any acute findings   Skin:     General: Skin is warm and dry  Findings: No erythema or rash  Neurological:      Mental Status: She is alert        Coordination: Coordination normal       Deep Tendon Reflexes: Reflexes normal    Psychiatric:         Behavior: Behavior normal               Recent Results (from the past 672 hour(s))   TSH, 3rd generation    Collection Time: 09/16/20  7:50 AM   Result Value Ref Range    TSH 3RD GENERATON 8 461 (H) 0 358 - 3 740 uIU/mL   T4, free    Collection Time: 09/16/20  7:50 AM   Result Value Ref Range    Free T4 0 85 0 76 - 1 46 ng/dL   Comprehensive metabolic panel    Collection Time: 09/16/20  7:50 AM   Result Value Ref Range    Sodium 141 136 - 145 mmol/L    Potassium 4 4 3 5 - 5 3 mmol/L    Chloride 104 100 - 108 mmol/L    CO2 31 21 - 32 mmol/L    ANION GAP 6 4 - 13 mmol/L    BUN 20 5 - 25 mg/dL    Creatinine 1 07 0 60 - 1 30 mg/dL    Glucose, Fasting 84 65 - 99 mg/dL    Calcium 8 9 8 3 - 10 1 mg/dL    AST 22 5 - 45 U/L    ALT 22 12 - 78 U/L    Alkaline Phosphatase 54 46 - 116 U/L    Total Protein 7 7 6 4 - 8 2 g/dL    Albumin 3 7 3 5 - 5 0 g/dL    Total Bilirubin 0 70 0 20 - 1 00 mg/dL    eGFR 60 ml/min/1 73sq m   Lipid Panel with Direct LDL reflex    Collection Time: 09/16/20  7:50 AM   Result Value Ref Range    Cholesterol 196 50 - 200 mg/dL    Triglycerides 41 <=150 mg/dL    HDL, Direct 84 >=40 mg/dL    LDL Calculated 104 (H) 0 - 100 mg/dL         Jyotsna Azul DO

## 2020-09-23 NOTE — TELEPHONE ENCOUNTER
Patient informed and is ok with changing dose   She also requested an appt with Dr Medhat Saini   She stated that she fell down and just wanted to be checked  I asked her if she was ok and she stated that she was fine as long as she take pain medication    Offered her an appt for 5:45 today with Dr Shelia Brar, [address]

## 2020-09-23 NOTE — ASSESSMENT & PLAN NOTE
Pt states she ran out of her levothyroxine and did not get a refill, about 6 months ago    I will reorder her old dose and look at numbers in 6 weeks

## 2020-10-10 ENCOUNTER — CLINICAL SUPPORT (OUTPATIENT)
Dept: FAMILY MEDICINE CLINIC | Facility: CLINIC | Age: 75
End: 2020-10-10
Payer: MEDICARE

## 2020-10-10 DIAGNOSIS — Z23 NEED FOR INFLUENZA VACCINATION: Primary | ICD-10-CM

## 2020-10-10 PROCEDURE — G0008 ADMIN INFLUENZA VIRUS VAC: HCPCS

## 2020-10-10 PROCEDURE — 90662 IIV NO PRSV INCREASED AG IM: CPT

## 2020-11-06 ENCOUNTER — TRANSCRIBE ORDERS (OUTPATIENT)
Dept: ADMINISTRATIVE | Facility: HOSPITAL | Age: 75
End: 2020-11-06

## 2020-11-06 ENCOUNTER — TELEPHONE (OUTPATIENT)
Dept: FAMILY MEDICINE CLINIC | Facility: CLINIC | Age: 75
End: 2020-11-06

## 2020-11-06 ENCOUNTER — APPOINTMENT (OUTPATIENT)
Dept: LAB | Facility: HOSPITAL | Age: 75
End: 2020-11-06
Attending: FAMILY MEDICINE
Payer: MEDICARE

## 2020-11-06 DIAGNOSIS — E03.9 ACQUIRED HYPOTHYROIDISM: ICD-10-CM

## 2020-11-06 LAB — TSH SERPL DL<=0.05 MIU/L-ACNC: 4.45 UIU/ML (ref 0.36–3.74)

## 2020-11-06 PROCEDURE — 36415 COLL VENOUS BLD VENIPUNCTURE: CPT

## 2020-11-06 PROCEDURE — 84443 ASSAY THYROID STIM HORMONE: CPT

## 2020-11-16 ENCOUNTER — OFFICE VISIT (OUTPATIENT)
Dept: OBGYN CLINIC | Facility: CLINIC | Age: 75
End: 2020-11-16
Payer: MEDICARE

## 2020-11-16 VITALS — BODY MASS INDEX: 27.62 KG/M2 | WEIGHT: 161.8 LBS | HEIGHT: 64 IN | TEMPERATURE: 97.3 F

## 2020-11-16 DIAGNOSIS — Z01.419 WELL WOMAN EXAM: Primary | ICD-10-CM

## 2020-11-16 PROCEDURE — G0101 CA SCREEN;PELVIC/BREAST EXAM: HCPCS | Performed by: PHYSICIAN ASSISTANT

## 2020-11-16 RX ORDER — CHLORAL HYDRATE 500 MG
1000 CAPSULE ORAL DAILY
COMMUNITY

## 2021-04-06 ENCOUNTER — OFFICE VISIT (OUTPATIENT)
Dept: FAMILY MEDICINE CLINIC | Facility: CLINIC | Age: 76
End: 2021-04-06
Payer: COMMERCIAL

## 2021-04-06 VITALS
BODY MASS INDEX: 27.31 KG/M2 | HEART RATE: 54 BPM | OXYGEN SATURATION: 98 % | RESPIRATION RATE: 14 BRPM | DIASTOLIC BLOOD PRESSURE: 62 MMHG | SYSTOLIC BLOOD PRESSURE: 138 MMHG | TEMPERATURE: 98.2 F | HEIGHT: 64 IN | WEIGHT: 160 LBS

## 2021-04-06 DIAGNOSIS — E03.9 ACQUIRED HYPOTHYROIDISM: Primary | ICD-10-CM

## 2021-04-06 DIAGNOSIS — D70.9 CHRONIC IDIOPATHIC NEUTROPENIA (HCC): ICD-10-CM

## 2021-04-06 DIAGNOSIS — Z12.31 SCREENING MAMMOGRAM FOR HIGH-RISK PATIENT: ICD-10-CM

## 2021-04-06 DIAGNOSIS — Z12.11 SCREEN FOR COLON CANCER: ICD-10-CM

## 2021-04-06 DIAGNOSIS — I27.20 PULMONARY HYPERTENSION (HCC): ICD-10-CM

## 2021-04-06 DIAGNOSIS — R92.2 DENSE BREAST TISSUE: ICD-10-CM

## 2021-04-06 DIAGNOSIS — Z13.6 SCREENING FOR CARDIOVASCULAR CONDITION: ICD-10-CM

## 2021-04-06 PROCEDURE — 99214 OFFICE O/P EST MOD 30 MIN: CPT | Performed by: FAMILY MEDICINE

## 2021-04-06 PROCEDURE — 1125F AMNT PAIN NOTED PAIN PRSNT: CPT | Performed by: FAMILY MEDICINE

## 2021-04-06 PROCEDURE — 3008F BODY MASS INDEX DOCD: CPT | Performed by: FAMILY MEDICINE

## 2021-04-06 PROCEDURE — 1160F RVW MEDS BY RX/DR IN RCRD: CPT | Performed by: FAMILY MEDICINE

## 2021-04-06 PROCEDURE — 1170F FXNL STATUS ASSESSED: CPT | Performed by: FAMILY MEDICINE

## 2021-04-06 PROCEDURE — 1036F TOBACCO NON-USER: CPT | Performed by: FAMILY MEDICINE

## 2021-04-06 PROCEDURE — 3288F FALL RISK ASSESSMENT DOCD: CPT | Performed by: FAMILY MEDICINE

## 2021-04-06 PROCEDURE — 3725F SCREEN DEPRESSION PERFORMED: CPT | Performed by: FAMILY MEDICINE

## 2021-04-06 RX ORDER — LEVOTHYROXINE SODIUM 0.03 MG/1
25 TABLET ORAL DAILY
Qty: 90 TABLET | Refills: 1 | Status: SHIPPED | OUTPATIENT
Start: 2021-04-06 | End: 2021-04-07 | Stop reason: SDUPTHER

## 2021-04-06 NOTE — PROGRESS NOTES
Assessment/Plan:    1  Acquired hypothyroidism  -     TSH, 3rd generation; Future  -     levothyroxine 25 mcg tablet; Take 1 tablet (25 mcg total) by mouth daily    2  Screening for cardiovascular condition  -     Comprehensive metabolic panel; Future  -     Lipid Panel with Direct LDL reflex; Future    3  Chronic idiopathic neutropenia (HCC)    4  Pulmonary hypertension ([facility] [address] )    5  Screen for colon cancer  -     Ambulatory referral to Gastroenterology; Future    6  BMI 27 0-27 9,adult    7  Screening mammogram for high-risk patient  -     Mammo screening bilateral w 3d & cad; Future; Expected date: 09/19/2021    8  Dense breast tissue  -     Mammo screening bilateral w 3d & cad; Future; Expected date: 09/19/2021            Patient Instructions       Medicare Preventive Visit Patient Instructions  Thank you for completing your Welcome to Medicare Visit or Medicare Annual Wellness Visit today  Your next wellness visit will be due in one year (4/7/2022)  The screening/preventive services that you may require over the next 5-10 years are detailed below  Some tests may not apply to you based off risk factors and/or age  Screening tests ordered at today's visit but not completed yet may show as past due  Also, please note that scanned in results may not display below  Preventive Screenings:  Service Recommendations Previous Testing/Comments   Colorectal Cancer Screening  * Colonoscopy    * Fecal Occult Blood Test (FOBT)/Fecal Immunochemical Test (FIT)  * Fecal DNA/Cologuard Test  * Flexible Sigmoidoscopy Age: 54-65 years old   Colonoscopy: every 10 years (may be performed more frequently if at higher risk)  OR  FOBT/FIT: every 1 year  OR  Cologuard: every 3 years  OR  Sigmoidoscopy: every 5 years  Screening may be recommended earlier than age 48 if at higher risk for colorectal cancer   Also, an individualized decision between you and your healthcare provider will decide whether screening between the ages of 74-80 would be appropriate  Colonoscopy: 05/09/2016  FOBT/FIT: Not on file  Cologuard: Not on file  Sigmoidoscopy: Not on file    Screening Current     Breast Cancer Screening Age: 36 years old  Frequency: every 1-2 years  Not required if history of left and right mastectomy Mammogram: 09/18/2020    Screening Current   Cervical Cancer Screening Between the ages of 21-29, pap smear recommended once every 3 years  Between the ages of 33-67, can perform pap smear with HPV co-testing every 5 years  Recommendations may differ for women with a history of total hysterectomy, cervical cancer, or abnormal pap smears in past  Pap Smear: 11/16/2020    Screening Not Indicated   Hepatitis C Screening Once for adults born between 1945 and 1965  More frequently in patients at high risk for Hepatitis C Hep C Antibody: 04/03/2019    Screening Current   Diabetes Screening 1-2 times per year if you're at risk for diabetes or have pre-diabetes Fasting glucose: 84 mg/dL   A1C: No results in last 5 years    Screening Current   Cholesterol Screening Once every 5 years if you don't have a lipid disorder  May order more often based on risk factors  Lipid panel: 09/16/2020    Screening Current     Other Preventive Screenings Covered by Medicare:  1  Abdominal Aortic Aneurysm (AAA) Screening: covered once if your at risk  You're considered to be at risk if you have a family history of AAA  2  Lung Cancer Screening: covers low dose CT scan once per year if you meet all of the following conditions: (1) Age 50-69; (2) No signs or symptoms of lung cancer; (3) Current smoker or have quit smoking within the last 15 years; (4) You have a tobacco smoking history of at least 30 pack years (packs per day multiplied by number of years you smoked); (5) You get a written order from a healthcare provider    3  Glaucoma Screening: covered annually if you're considered high risk: (1) You have diabetes OR (2) Family history of glaucoma OR (3)  aged 48 and older OR (4)  American aged 72 and older  4  Osteoporosis Screening: covered every 2 years if you meet one of the following conditions: (1) You're estrogen deficient and at risk for osteoporosis based off medical history and other findings; (2) Have a vertebral abnormality; (3) On glucocorticoid therapy for more than 3 months; (4) Have primary hyperparathyroidism; (5) On osteoporosis medications and need to assess response to drug therapy  · Last bone density test (DXA Scan): 02/04/2020   5  HIV Screening: covered annually if you're between the age of 15-65  Also covered annually if you are younger than 13 and older than 72 with risk factors for HIV infection  For pregnant patients, it is covered up to 3 times per pregnancy  Immunizations:  Immunization Recommendations   Influenza Vaccine Annual influenza vaccination during flu season is recommended for all persons aged >= 6 months who do not have contraindications   Pneumococcal Vaccine (Prevnar and Pneumovax)  * Prevnar = PCV13  * Pneumovax = PPSV23   Adults 25-60 years old: 1-3 doses may be recommended based on certain risk factors  Adults 72 years old: Prevnar (PCV13) vaccine recommended followed by Pneumovax (PPSV23) vaccine  If already received PPSV23 since turning 65, then PCV13 recommended at least one year after PPSV23 dose  Hepatitis B Vaccine 3 dose series if at intermediate or high risk (ex: diabetes, end stage renal disease, liver disease)   Tetanus (Td) Vaccine - COST NOT COVERED BY MEDICARE PART B Following completion of primary series, a booster dose should be given every 10 years to maintain immunity against tetanus  Td may also be given as tetanus wound prophylaxis  Tdap Vaccine - COST NOT COVERED BY MEDICARE PART B Recommended at least once for all adults  For pregnant patients, recommended with each pregnancy     Shingles Vaccine (Shingrix) - COST NOT COVERED BY MEDICARE PART B  2 shot series recommended in those aged 48 and above     Health Maintenance Due:      Topic Date Due    Colonoscopy Surveillance  05/09/2021    DXA SCAN  02/04/2022    Colorectal Cancer Screening  05/09/2026    Hepatitis C Screening  Completed     Immunizations Due:      Topic Date Due    COVID-19 Vaccine (1) Never done    DTaP,Tdap,and Td Vaccines (1 - Tdap) 12/04/1966     Advance Directives   What are advance directives? Advance directives are legal documents that state your wishes and plans for medical care  These plans are made ahead of time in case you lose your ability to make decisions for yourself  Advance directives can apply to any medical decision, such as the treatments you want, and if you want to donate organs  What are the types of advance directives? There are many types of advance directives, and each state has rules about how to use them  You may choose a combination of any of the following:  · Living will: This is a written record of the treatment you want  You can also choose which treatments you do not want, which to limit, and which to stop at a certain time  This includes surgery, medicine, IV fluid, and tube feedings  · Durable power of  for healthcare [facility]): This is a written record that states who you want to make healthcare choices for you when you are unable to make them for yourself  This person, called a proxy, is usually a family member or a friend  You may choose more than 1 proxy  · Do not resuscitate (DNR) order:  A DNR order is used in case your heart stops beating or you stop breathing  It is a request not to have certain forms of treatment, such as CPR  A DNR order may be included in other types of advance directives  · Medical directive: This covers the care that you want if you are in a coma, near death, or unable to make decisions for yourself  You can list the treatments you want for each condition   Treatment may include pain medicine, surgery, blood transfusions, dialysis, IV or tube feedings, and a ventilator (breathing machine)  · Values history: This document has questions about your views, beliefs, and how you feel and think about life  This information can help others choose the care that you would choose  Why are advance directives important? An advance directive helps you control your care  Although spoken wishes may be used, it is better to have your wishes written down  Spoken wishes can be misunderstood, or not followed  Treatments may be given even if you do not want them  An advance directive may make it easier for your family to make difficult choices about your care  Fall Prevention    Fall prevention  includes ways to make your home and other areas safer  It also includes ways you can move more carefully to prevent a fall  Health conditions that cause changes in your blood pressure, vision, or muscle strength and coordination may increase your risk for falls  Medicines may also increase your risk for falls if they make you dizzy, weak, or sleepy  Fall prevention tips:   · Stand or sit up slowly  · Use assistive devices as directed  · Wear shoes that fit well and have soles that   · Wear a personal alarm  · Stay active  · Manage your medical conditions  Home Safety Tips:  · Add items to prevent falls in the bathroom  · Keep paths clear  · Install bright lights in your home  · Keep items you use often on shelves within reach  · Paint or place reflective tape on the edges of your stairs  Weight Management   Why it is important to manage your weight:  Being overweight increases your risk of health conditions such as heart disease, high blood pressure, type 2 diabetes, and certain types of cancer  It can also increase your risk for osteoarthritis, sleep apnea, and other respiratory problems  Aim for a slow, steady weight loss  Even a small amount of weight loss can lower your risk of health problems    How to lose weight safely:  A safe and healthy way to lose weight is to eat fewer calories and get regular exercise  You can lose up about 1 pound a week by decreasing the number of calories you eat by 500 calories each day  Healthy meal plan for weight management:  A healthy meal plan includes a variety of foods, contains fewer calories, and helps you stay healthy  A healthy meal plan includes the following:  · Eat whole-grain foods more often  A healthy meal plan should contain fiber  Fiber is the part of grains, fruits, and vegetables that is not broken down by your body  Whole-grain foods are healthy and provide extra fiber in your diet  Some examples of whole-grain foods are whole-wheat breads and pastas, oatmeal, brown rice, and bulgur  · Eat a variety of vegetables every day  Include dark, leafy greens such as spinach, kale, mehran greens, and mustard greens  Eat yellow and orange vegetables such as carrots, sweet potatoes, and winter squash  · Eat a variety of fruits every day  Choose fresh or canned fruit (canned in its own juice or light syrup) instead of juice  Fruit juice has very little or no fiber  · Eat low-fat dairy foods  Drink fat-free (skim) milk or 1% milk  Eat fat-free yogurt and low-fat cottage cheese  Try low-fat cheeses such as mozzarella and other reduced-fat cheeses  · Choose meat and other protein foods that are low in fat  Choose beans or other legumes such as split peas or lentils  Choose fish, skinless poultry (chicken or turkey), or lean cuts of red meat (beef or pork)  Before you cook meat or poultry, cut off any visible fat  · Use less fat and oil  Try baking foods instead of frying them  Add less fat, such as margarine, sour cream, regular salad dressing and mayonnaise to foods  Eat fewer high-fat foods  Some examples of high-fat foods include french fries, doughnuts, ice cream, and cakes  · Eat fewer sweets  Limit foods and drinks that are high in sugar   This includes candy, cookies, regular soda, and sweetened drinks  Exercise:  Exercise at least 30 minutes per day on most days of the week  Some examples of exercise include walking, biking, dancing, and swimming  You can also fit in more physical activity by taking the stairs instead of the elevator or parking farther away from stores  Ask your healthcare provider about the best exercise plan for you  © Copyright [employer] 2018 Information is for End User's use only and may not be sold, redistributed or otherwise used for commercial purposes  All illustrations and images included in CareNotes® are the copyrighted property of [employer]  or [facility] Poornima Alvarado     Obesity   AMBULATORY CARE:   Obesity  is when your body mass index (BMI) is greater than 30  Your healthcare provider will use your height and weight to measure your BMI  The risks of obesity include  many health problems, such as injuries or physical disability  You may need tests to check for the following:  · Diabetes    · High blood pressure or high cholesterol    · Heart disease    · Gallbladder or liver disease    · Cancer of the colon, breast, prostate, liver, or kidney    · Sleep apnea    · Arthritis or gout    Seek care immediately if:   · You have a severe headache, confusion, or difficulty speaking  · You have weakness on one side of your body  · You have chest pain, sweating, or shortness of breath  Contact your healthcare provider if:   · You have symptoms of gallbladder or liver disease, such as pain in your upper abdomen  · You have knee or hip pain and discomfort while walking  · You have symptoms of diabetes, such as intense hunger and thirst, and frequent urination  · You have symptoms of sleep apnea, such as snoring or daytime sleepiness  · You have questions or concerns about your condition or care  Treatment for obesity  focuses on helping you lose weight to improve your health   Even a small decrease in BMI can reduce the risk for many health problems  Your healthcare provider will help you set a weight-loss goal   · Lifestyle changes  are the first step in treating obesity  These include making healthy food choices and getting regular physical activity  Your healthcare provider may suggest a weight-loss program that involves coaching, education, and therapy  · Medicine  may help you lose weight when it is used with a healthy diet and physical activity  · Surgery  can help you lose weight if you are very obese and have other health problems  There are several types of weight-loss surgery  Ask your healthcare provider for more information  Be successful losing weight:   · Set small, realistic goals  An example of a small goal is to walk for 20 minutes 5 days a week  Anther goal is to lose 5% of your body weight  · Tell friends, family members, and coworkers about your goals  and ask for their support  Ask a friend to lose weight with you, or join a weight-loss support group  · Identify foods or triggers that may cause you to overeat , and find ways to avoid them  Remove tempting high-calorie foods from your home and workplace  Place a bowl of fresh fruit on your kitchen counter  If stress causes you to eat, then find other ways to cope with stress  · Keep a diary to track what you eat and drink  Also write down how many minutes of physical activity you do each day  Weigh yourself once a week and record it in your diary  Eating changes: You will need to eat 500 to 1,000 fewer calories each day than you currently eat to lose 1 to 2 pounds a week  The following changes will help you cut calories:  · Eat smaller portions  Use small plates, no larger than 9 inches in diameter  Fill your plate half full of fruits and vegetables  Measure your food using measuring cups until you know what a serving size looks like  · Eat 3 meals and 1 or 2 snacks each day  Plan your meals in advance  Ashleigh Aleman and eat at home most of the time   Eat slowly  Do not skip meals  Skipping meals can lead to overeating later in the day  This can make it harder for you to lose weight  Talk with a dietitian to help you make a meal plan and schedule that is right for you  · Eat fruits and vegetables at every meal   They are low in calories and high in fiber, which makes you feel full  Do not add butter, margarine, or cream sauce to vegetables  Use herbs to season steamed vegetables  · Eat less fat and fewer fried foods  Eat more baked or grilled chicken and fish  These protein sources are lower in calories and fat than red meat  Limit fast food  Dress your salads with olive oil and vinegar instead of bottled dressing  · Limit the amount of sugar you eat  Do not drink sugary beverages  Limit alcohol  Activity changes:  Physical activity is good for your body in many ways  It helps you burn calories and build strong muscles  It decreases stress and depression, and improves your mood  It can also help you sleep better  Talk to your healthcare provider before you begin an exercise program   · Exercise for at least 30 minutes 5 days a week  Start slowly  Set aside time each day for physical activity that you enjoy and that is convenient for you  It is best to do both weight training and an activity that increases your heart rate, such as walking, bicycling, or swimming  · Find ways to be more active  Do yard work and housecleaning  Walk up the stairs instead of using elevators  Spend your leisure time going to events that require walking, such as outdoor festivals or fairs  This extra physical activity can help you lose weight and keep it off  Follow up with your healthcare provider as directed: You may need to meet with a dietitian  Write down your questions so you remember to ask them during your visits    © Copyright [address] Information is for End User's use only and may not be sold, redistributed or otherwise used for commercial purposes  All illustrations and images included in CareNotes® are the copyrighted property of A D A M , Inc  or [facility] [employer]ornima Alvarado   The above information is an  only  It is not intended as medical advice for individual conditions or treatments  Talk to your doctor, nurse or pharmacist before following any medical regimen to see if it is safe and effective for you  Return for AWV august     Subjective:      Patient ID: Robert Colbert is a 70 y o  female  Chief Complaint   Patient presents with    Medicare Wellness Visit     maf cma       Pt is sched for an AWV but is only due for a follow up  Pt is due for labs  Pt denies CP, no SOB  Pt feels she is due for her colon      The following portions of the patient's history were reviewed and updated as appropriate: allergies, current medications, past family history, past medical history, past social history, past surgical history and problem list     Review of Systems   Constitutional: Negative  Negative for activity change, appetite change, chills, diaphoresis and fatigue  HENT: Negative  Negative for dental problem, ear pain, sinus pressure and sore throat  Eyes: Negative  Negative for photophobia, pain, discharge, redness, itching and visual disturbance  Respiratory: Negative for apnea and chest tightness  Cardiovascular: Negative  Negative for chest pain, palpitations and leg swelling  Gastrointestinal: Negative  Negative for abdominal distention, abdominal pain, constipation and diarrhea  Endocrine: Negative  Negative for cold intolerance and heat intolerance  Genitourinary: Negative  Negative for difficulty urinating and dyspareunia  Musculoskeletal: Negative  Negative for arthralgias and back pain  Skin: Negative  Allergic/Immunologic: Negative for environmental allergies  Neurological: Negative  Negative for dizziness  Psychiatric/Behavioral: Negative  Negative for agitation           Current Outpatient Medications   Medication Sig Dispense Refill    GARLIC PO Take by mouth      levothyroxine 25 mcg tablet Take 1 tablet (25 mcg total) by mouth daily 90 tablet 1    Omega-3 Fatty Acids (fish oil) 1,000 mg Take 1,000 mg by mouth daily      Turmeric 500 MG CAPS Take 500 mg by mouth       No current facility-administered medications for this visit  Objective:    /62   Pulse (!) 54   Temp 98 2 °F (36 8 °C)   Resp 14   Ht 5' 4" (1 626 m)   Wt 72 6 kg (160 lb)   SpO2 98%   BMI 27 46 kg/m²        Physical Exam  Vitals signs and nursing note reviewed  Constitutional:       General: She is not in acute distress  Appearance: She is well-developed  She is not diaphoretic  HENT:      Head: Normocephalic and atraumatic  Right Ear: External ear normal       Left Ear: External ear normal       Nose: Nose normal       Mouth/Throat:      Pharynx: No oropharyngeal exudate  Eyes:      General: No scleral icterus  Right eye: No discharge  Left eye: No discharge  Pupils: Pupils are equal, round, and reactive to light  Neck:      Thyroid: No thyromegaly  Cardiovascular:      Rate and Rhythm: Normal rate  Heart sounds: Normal heart sounds  No murmur  Pulmonary:      Effort: Pulmonary effort is normal  No respiratory distress  Breath sounds: Normal breath sounds  No wheezing  Abdominal:      General: Bowel sounds are normal  There is no distension  Palpations: Abdomen is soft  There is no mass  Tenderness: There is no abdominal tenderness  There is no guarding or rebound  Musculoskeletal: Normal range of motion  Skin:     General: Skin is warm and dry  Findings: No erythema or rash  Neurological:      Mental Status: She is alert  Coordination: Coordination normal       Deep Tendon Reflexes: Reflexes normal    Psychiatric:         Behavior: Behavior normal                 Racheal Riley DO  BMI Counseling: Body mass index is 27 46 kg/m²  The BMI is above normal  Nutrition recommendations include reducing portion sizes

## 2021-04-06 NOTE — PROGRESS NOTES
Assessment and Plan:     Problem List Items Addressed This Visit     None           Preventive health issues were discussed with patient, and age appropriate screening tests were ordered as noted in patient's After Visit Summary  Personalized health advice and appropriate referrals for health education or preventive services given if needed, as noted in patient's After Visit Summary  History of Present Illness:     Patient presents for Medicare Annual Wellness visit    Patient Care Team:  Tatum Braswell DO as PCP - General (Family Medicine)  Ju Tovar MD as Consulting Physician (Ophthalmology)  Darling Jarvis MD as Consulting Physician (Gastroenterology)     Problem List:     Patient Active Problem List   Diagnosis    Abnormal mammogram of right breast    Dense breast tissue    Murmur    Acquired hypothyroidism    Primary osteoarthritis of right knee    Primary osteoarthritis of right shoulder    Pulmonary hypertension ([address] )    Arthralgia      Past Medical and Surgical History:     Past Medical History:   Diagnosis Date    Leukocytosis     last assessed: 10/21/2016     Past Surgical History:   Procedure Laterality Date     SECTION      5 c sec    COLONOSCOPY N/A 2016    Procedure: COLONOSCOPY;  Surgeon: Darling Jarvis MD;  Location: [facility];   Service:       Family History:     Family History   Problem Relation Age of Onset    Arthritis Mother         Knee    Hypertension Mother     Heart disease Mother         Pacemaker Placement    Hypertension Father     Heart disease Brother     No Known Problems Sister     No Known Problems Daughter     No Known Problems Sister     No Known Problems Daughter     No Known Problems Daughter     No Known Problems Daughter     Mental illness Neg Hx       Social History:        Social History     Socioeconomic History    Marital status: /Civil Union     Spouse name: None    Number of children: None    Years of education: None  Highest education level: None   Occupational History    None   Social Needs    Financial resource strain: None    Food insecurity     Worry: None     Inability: None    Transportation needs     Medical: None     Non-medical: None   Tobacco Use    Smoking status: Never Smoker    Smokeless tobacco: Former User   Substance and Sexual Activity    Alcohol use: No    Drug use: No    Sexual activity: None   Lifestyle    Physical activity     Days per week: None     Minutes per session: None    Stress: None   Relationships    Social connections     Talks on phone: None     Gets together: None     Attends Religion service: None     Active member of club or organization: None     Attends meetings of clubs or organizations: None     Relationship status: None    Intimate partner violence     Fear of current or ex partner: None     Emotionally abused: None     Physically abused: None     Forced sexual activity: None   Other Topics Concern    None   Social History Narrative    None      Medications and Allergies:     Current Outpatient Medications   Medication Sig Dispense Refill    GARLIC PO Take by mouth      levothyroxine 25 mcg tablet Take 1 tablet (25 mcg total) by mouth daily 90 tablet 1    Omega-3 Fatty Acids (fish oil) 1,000 mg Take 1,000 mg by mouth daily      Turmeric 500 MG CAPS Take 500 mg by mouth       No current facility-administered medications for this visit        No Known Allergies   Immunizations:     Immunization History   Administered Date(s) Administered    Influenza Split High Dose Preservative Free IM 10/09/2015    Influenza, high dose seasonal 0 7 mL 11/05/2019, 10/10/2020    Pneumococcal Conjugate 13-Valent 09/23/2015    Pneumococcal Polysaccharide PPV23 04/02/2019    TD (adult) Preservative Free 04/02/2019      Health Maintenance:         Topic Date Due    Colonoscopy Surveillance  05/09/2021    DXA SCAN  02/04/2022    Colorectal Cancer Screening  05/09/2026    Hepatitis C Screening  Completed         Topic Date Due    COVID-19 Vaccine (1) Never done    DTaP,Tdap,and Td Vaccines (1 - Tdap) 12/04/1966      Medicare Health Risk Assessment:     /62   Pulse (!) 54   Temp 98 2 °F (36 8 °C)   Resp 14   Ht 5' 4" (1 626 m)   Wt 72 6 kg (160 lb)   SpO2 98%   BMI 27 46 kg/m²          Health Risk Assessment:   Patient rates overall health as good  Patient feels that their physical health rating is same  Patient is satisfied with their life  Eyesight was rated as slightly worse  Hearing was rated as same  Patient feels that their emotional and mental health rating is same  Patients states they are never, rarely angry  Patient states they are never, rarely unusually tired/fatigued  Pain experienced in the last 7 days has been some  Patient's pain rating has been 4/10  Patient states that she has experienced no weight loss or gain in last 6 months  Depression Screening:   PHQ-2 Score: 0      Fall Risk Screening: In the past year, patient has experienced: history of falling in past year    Number of falls: 1  Injured during fall?: No    Feels unsteady when standing or walking?: No    Worried about falling?: No      Urinary Incontinence Screening:   Patient has not leaked urine accidently in the last six months  Home Safety:  Patient does not have trouble with stairs inside or outside of their home  Patient has working smoke alarms and has working carbon monoxide detector  Home safety hazards include: none  Nutrition:   Current diet is Regular  Mostly healthy    Medications:   Patient is currently taking over-the-counter supplements  OTC medications include: see medication list  Patient is able to manage medications  Activities of Daily Living (ADLs)/Instrumental Activities of Daily Living (IADLs):   Walk and transfer into and out of bed and chair?: Yes  Dress and groom yourself?: Yes    Bathe or shower yourself?: Yes    Feed yourself?  Yes  Do your laundry/housekeeping?: Yes  Manage your money, pay your bills and track your expenses?: Yes  Make your own meals?: Yes    Do your own shopping?: Yes    Previous Hospitalizations:   Any hospitalizations or ED visits within the last 12 months?: No      Advance Care Planning:   Living will: No      PREVENTIVE SCREENINGS      Cardiovascular Screening:    General: Screening Current      Diabetes Screening:     General: Screening Current      Colorectal Cancer Screening:     General: Screening Current      Breast Cancer Screening:     General: Screening Current      Cervical Cancer Screening:    General: Screening Not Indicated      Osteoporosis Screening:    General: Screening Current      Lung Cancer Screening:     General: Screening Not Indicated      Hepatitis C Screening:    General: Screening Current    Screening, Brief Intervention, and Referral to Treatment (SBIRT)    Screening      AUDIT-C Screenin) How often did you have a drink containing alcohol in the past year? never  2) How many drinks did you have on a typical day when you were drinking in the past year? never  3) How often did you have 6 or more drinks on one occasion in the past year? never    AUDIT-C Score: 0  Interpretation: Score 0-2 (female): Negative screen for alcohol misuse      Rowan Guan, DO

## 2021-04-06 NOTE — PATIENT INSTRUCTIONS
Medicare Preventive Visit Patient Instructions  Thank you for completing your Welcome to Medicare Visit or Medicare Annual Wellness Visit today  Your next wellness visit will be due in one year (4/7/2022)  The screening/preventive services that you may require over the next 5-10 years are detailed below  Some tests may not apply to you based off risk factors and/or age  Screening tests ordered at today's visit but not completed yet may show as past due  Also, please note that scanned in results may not display below  Preventive Screenings:  Service Recommendations Previous Testing/Comments   Colorectal Cancer Screening  * Colonoscopy    * Fecal Occult Blood Test (FOBT)/Fecal Immunochemical Test (FIT)  * Fecal DNA/Cologuard Test  * Flexible Sigmoidoscopy Age: 54-65 years old   Colonoscopy: every 10 years (may be performed more frequently if at higher risk)  OR  FOBT/FIT: every 1 year  OR  Cologuard: every 3 years  OR  Sigmoidoscopy: every 5 years  Screening may be recommended earlier than age 48 if at higher risk for colorectal cancer  Also, an individualized decision between you and your healthcare provider will decide whether screening between the ages of 74-80 would be appropriate  Colonoscopy: 05/09/2016  FOBT/FIT: Not on file  Cologuard: Not on file  Sigmoidoscopy: Not on file    Screening Current     Breast Cancer Screening Age: 36 years old  Frequency: every 1-2 years  Not required if history of left and right mastectomy Mammogram: 09/18/2020    Screening Current   Cervical Cancer Screening Between the ages of 21-29, pap smear recommended once every 3 years  Between the ages of 33-67, can perform pap smear with HPV co-testing every 5 years     Recommendations may differ for women with a history of total hysterectomy, cervical cancer, or abnormal pap smears in past  Pap Smear: 11/16/2020    Screening Not Indicated   Hepatitis C Screening Once for adults born between 1945 and 1965  More frequently in patients at high risk for Hepatitis C Hep C Antibody: 04/03/2019    Screening Current   Diabetes Screening 1-2 times per year if you're at risk for diabetes or have pre-diabetes Fasting glucose: 84 mg/dL   A1C: No results in last 5 years    Screening Current   Cholesterol Screening Once every 5 years if you don't have a lipid disorder  May order more often based on risk factors  Lipid panel: 09/16/2020    Screening Current     Other Preventive Screenings Covered by Medicare:  1  Abdominal Aortic Aneurysm (AAA) Screening: covered once if your at risk  You're considered to be at risk if you have a family history of AAA  2  Lung Cancer Screening: covers low dose CT scan once per year if you meet all of the following conditions: (1) Age 50-69; (2) No signs or symptoms of lung cancer; (3) Current smoker or have quit smoking within the last 15 years; (4) You have a tobacco smoking history of at least 30 pack years (packs per day multiplied by number of years you smoked); (5) You get a written order from a healthcare provider  3  Glaucoma Screening: covered annually if you're considered high risk: (1) You have diabetes OR (2) Family history of glaucoma OR (3)  aged 48 and older OR (3)  American aged 72 and older  3  Osteoporosis Screening: covered every 2 years if you meet one of the following conditions: (1) You're estrogen deficient and at risk for osteoporosis based off medical history and other findings; (2) Have a vertebral abnormality; (3) On glucocorticoid therapy for more than 3 months; (4) Have primary hyperparathyroidism; (5) On osteoporosis medications and need to assess response to drug therapy  · Last bone density test (DXA Scan): 02/04/2020   5  HIV Screening: covered annually if you're between the age of 15-65  Also covered annually if you are younger than 13 and older than 72 with risk factors for HIV infection   For pregnant patients, it is covered up to 3 times per pregnancy  Immunizations:  Immunization Recommendations   Influenza Vaccine Annual influenza vaccination during flu season is recommended for all persons aged >= 6 months who do not have contraindications   Pneumococcal Vaccine (Prevnar and Pneumovax)  * Prevnar = PCV13  * Pneumovax = PPSV23   Adults 25-60 years old: 1-3 doses may be recommended based on certain risk factors  Adults 72 years old: Prevnar (PCV13) vaccine recommended followed by Pneumovax (PPSV23) vaccine  If already received PPSV23 since turning 65, then PCV13 recommended at least one year after PPSV23 dose  Hepatitis B Vaccine 3 dose series if at intermediate or high risk (ex: diabetes, end stage renal disease, liver disease)   Tetanus (Td) Vaccine - COST NOT COVERED BY MEDICARE PART B Following completion of primary series, a booster dose should be given every 10 years to maintain immunity against tetanus  Td may also be given as tetanus wound prophylaxis  Tdap Vaccine - COST NOT COVERED BY MEDICARE PART B Recommended at least once for all adults  For pregnant patients, recommended with each pregnancy  Shingles Vaccine (Shingrix) - COST NOT COVERED BY MEDICARE PART B  2 shot series recommended in those aged 48 and above     Health Maintenance Due:      Topic Date Due    Colonoscopy Surveillance  05/09/2021    DXA SCAN  02/04/2022    Colorectal Cancer Screening  05/09/2026    Hepatitis C Screening  Completed     Immunizations Due:      Topic Date Due    COVID-19 Vaccine (1) Never done    DTaP,Tdap,and Td Vaccines (1 - Tdap) 12/04/1966     Advance Directives   What are advance directives? Advance directives are legal documents that state your wishes and plans for medical care  These plans are made ahead of time in case you lose your ability to make decisions for yourself  Advance directives can apply to any medical decision, such as the treatments you want, and if you want to donate organs  What are the types of advance directives? There are many types of advance directives, and each state has rules about how to use them  You may choose a combination of any of the following:  · Living will: This is a written record of the treatment you want  You can also choose which treatments you do not want, which to limit, and which to stop at a certain time  This includes surgery, medicine, IV fluid, and tube feedings  · Durable power of  for healthcare [facility]): This is a written record that states who you want to make healthcare choices for you when you are unable to make them for yourself  This person, called a proxy, is usually a family member or a friend  You may choose more than 1 proxy  · Do not resuscitate (DNR) order:  A DNR order is used in case your heart stops beating or you stop breathing  It is a request not to have certain forms of treatment, such as CPR  A DNR order may be included in other types of advance directives  · Medical directive: This covers the care that you want if you are in a coma, near death, or unable to make decisions for yourself  You can list the treatments you want for each condition  Treatment may include pain medicine, surgery, blood transfusions, dialysis, IV or tube feedings, and a ventilator (breathing machine)  · Values history: This document has questions about your views, beliefs, and how you feel and think about life  This information can help others choose the care that you would choose  Why are advance directives important? An advance directive helps you control your care  Although spoken wishes may be used, it is better to have your wishes written down  Spoken wishes can be misunderstood, or not followed  Treatments may be given even if you do not want them  An advance directive may make it easier for your family to make difficult choices about your care  Fall Prevention    Fall prevention  includes ways to make your home and other areas safer   It also includes ways you can move more carefully to prevent a fall  Health conditions that cause changes in your blood pressure, vision, or muscle strength and coordination may increase your risk for falls  Medicines may also increase your risk for falls if they make you dizzy, weak, or sleepy  Fall prevention tips:   · Stand or sit up slowly  · Use assistive devices as directed  · Wear shoes that fit well and have soles that   · Wear a personal alarm  · Stay active  · Manage your medical conditions  Home Safety Tips:  · Add items to prevent falls in the bathroom  · Keep paths clear  · Install bright lights in your home  · Keep items you use often on shelves within reach  · Paint or place reflective tape on the edges of your stairs  Weight Management   Why it is important to manage your weight:  Being overweight increases your risk of health conditions such as heart disease, high blood pressure, type 2 diabetes, and certain types of cancer  It can also increase your risk for osteoarthritis, sleep apnea, and other respiratory problems  Aim for a slow, steady weight loss  Even a small amount of weight loss can lower your risk of health problems  How to lose weight safely:  A safe and healthy way to lose weight is to eat fewer calories and get regular exercise  You can lose up about 1 pound a week by decreasing the number of calories you eat by 500 calories each day  Healthy meal plan for weight management:  A healthy meal plan includes a variety of foods, contains fewer calories, and helps you stay healthy  A healthy meal plan includes the following:  · Eat whole-grain foods more often  A healthy meal plan should contain fiber  Fiber is the part of grains, fruits, and vegetables that is not broken down by your body  Whole-grain foods are healthy and provide extra fiber in your diet  Some examples of whole-grain foods are whole-wheat breads and pastas, oatmeal, brown rice, and bulgur    · Eat a variety of vegetables every day  Include dark, leafy greens such as spinach, kale, mehran greens, and mustard greens  Eat yellow and orange vegetables such as carrots, sweet potatoes, and winter squash  · Eat a variety of fruits every day  Choose fresh or canned fruit (canned in its own juice or light syrup) instead of juice  Fruit juice has very little or no fiber  · Eat low-fat dairy foods  Drink fat-free (skim) milk or 1% milk  Eat fat-free yogurt and low-fat cottage cheese  Try low-fat cheeses such as mozzarella and other reduced-fat cheeses  · Choose meat and other protein foods that are low in fat  Choose beans or other legumes such as split peas or lentils  Choose fish, skinless poultry (chicken or turkey), or lean cuts of red meat (beef or pork)  Before you cook meat or poultry, cut off any visible fat  · Use less fat and oil  Try baking foods instead of frying them  Add less fat, such as margarine, sour cream, regular salad dressing and mayonnaise to foods  Eat fewer high-fat foods  Some examples of high-fat foods include french fries, doughnuts, ice cream, and cakes  · Eat fewer sweets  Limit foods and drinks that are high in sugar  This includes candy, cookies, regular soda, and sweetened drinks  Exercise:  Exercise at least 30 minutes per day on most days of the week  Some examples of exercise include walking, biking, dancing, and swimming  You can also fit in more physical activity by taking the stairs instead of the elevator or parking farther away from stores  Ask your healthcare provider about the best exercise plan for you  © Copyright [employer] 2018 Information is for End User's use only and may not be sold, redistributed or otherwise used for commercial purposes  All illustrations and images included in CareNotes® are the copyrighted property of A D A M , Inc  or Floyd Andrea    Obesity   AMBULATORY CARE:   Obesity  is when your body mass index (BMI) is greater than 30   Your healthcare provider will use your height and weight to measure your BMI  The risks of obesity include  many health problems, such as injuries or physical disability  You may need tests to check for the following:  · Diabetes    · High blood pressure or high cholesterol    · Heart disease    · Gallbladder or liver disease    · Cancer of the colon, breast, prostate, liver, or kidney    · Sleep apnea    · Arthritis or gout    Seek care immediately if:   · You have a severe headache, confusion, or difficulty speaking  · You have weakness on one side of your body  · You have chest pain, sweating, or shortness of breath  Contact your healthcare provider if:   · You have symptoms of gallbladder or liver disease, such as pain in your upper abdomen  · You have knee or hip pain and discomfort while walking  · You have symptoms of diabetes, such as intense hunger and thirst, and frequent urination  · You have symptoms of sleep apnea, such as snoring or daytime sleepiness  · You have questions or concerns about your condition or care  Treatment for obesity  focuses on helping you lose weight to improve your health  Even a small decrease in BMI can reduce the risk for many health problems  Your healthcare provider will help you set a weight-loss goal   · Lifestyle changes  are the first step in treating obesity  These include making healthy food choices and getting regular physical activity  Your healthcare provider may suggest a weight-loss program that involves coaching, education, and therapy  · Medicine  may help you lose weight when it is used with a healthy diet and physical activity  · Surgery  can help you lose weight if you are very obese and have other health problems  There are several types of weight-loss surgery  Ask your healthcare provider for more information  Be successful losing weight:   · Set small, realistic goals  An example of a small goal is to walk for 20 minutes 5 days a week  Danielle goal is to lose 5% of your body weight  · Tell friends, family members, and coworkers about your goals  and ask for their support  Ask a friend to lose weight with you, or join a weight-loss support group  · Identify foods or triggers that may cause you to overeat , and find ways to avoid them  Remove tempting high-calorie foods from your home and workplace  Place a bowl of fresh fruit on your kitchen counter  If stress causes you to eat, then find other ways to cope with stress  · Keep a diary to track what you eat and drink  Also write down how many minutes of physical activity you do each day  Weigh yourself once a week and record it in your diary  Eating changes: You will need to eat 500 to 1,000 fewer calories each day than you currently eat to lose 1 to 2 pounds a week  The following changes will help you cut calories:  · Eat smaller portions  Use small plates, no larger than 9 inches in diameter  Fill your plate half full of fruits and vegetables  Measure your food using measuring cups until you know what a serving size looks like  · Eat 3 meals and 1 or 2 snacks each day  Plan your meals in advance  Lenon Power and eat at home most of the time  Eat slowly  Do not skip meals  Skipping meals can lead to overeating later in the day  This can make it harder for you to lose weight  Talk with a dietitian to help you make a meal plan and schedule that is right for you  · Eat fruits and vegetables at every meal   They are low in calories and high in fiber, which makes you feel full  Do not add butter, margarine, or cream sauce to vegetables  Use herbs to season steamed vegetables  · Eat less fat and fewer fried foods  Eat more baked or grilled chicken and fish  These protein sources are lower in calories and fat than red meat  Limit fast food  Dress your salads with olive oil and vinegar instead of bottled dressing  · Limit the amount of sugar you eat    Do not drink sugary beverages  Limit alcohol  Activity changes:  Physical activity is good for your body in many ways  It helps you burn calories and build strong muscles  It decreases stress and depression, and improves your mood  It can also help you sleep better  Talk to your healthcare provider before you begin an exercise program   · Exercise for at least 30 minutes 5 days a week  Start slowly  Set aside time each day for physical activity that you enjoy and that is convenient for you  It is best to do both weight training and an activity that increases your heart rate, such as walking, bicycling, or swimming  · Find ways to be more active  Do yard work and housecleaning  Walk up the stairs instead of using elevators  Spend your leisure time going to events that require walking, such as outdoor festivals or fairs  This extra physical activity can help you lose weight and keep it off  Follow up with your healthcare provider as directed: You may need to meet with a dietitian  Write down your questions so you remember to ask them during your visits  © Copyright [address] Information is for End User's use only and may not be sold, redistributed or otherwise used for commercial purposes  All illustrations and images included in CareNotes® are the copyrighted property of A D A M , Inc  or [address] [place], [place] [address]   The above information is an  only  It is not intended as medical advice for individual conditions or treatments  Talk to your doctor, nurse or pharmacist before following any medical regimen to see if it is safe and effective for you

## 2021-04-07 ENCOUNTER — TELEPHONE (OUTPATIENT)
Dept: WOUND CARE | Facility: HOSPITAL | Age: 76
End: 2021-04-07

## 2021-04-07 ENCOUNTER — APPOINTMENT (OUTPATIENT)
Dept: LAB | Facility: HOSPITAL | Age: 76
End: 2021-04-07
Attending: FAMILY MEDICINE
Payer: COMMERCIAL

## 2021-04-07 ENCOUNTER — TRANSCRIBE ORDERS (OUTPATIENT)
Dept: ADMINISTRATIVE | Facility: HOSPITAL | Age: 76
End: 2021-04-07

## 2021-04-07 DIAGNOSIS — Z13.6 SCREENING FOR CARDIOVASCULAR CONDITION: ICD-10-CM

## 2021-04-07 DIAGNOSIS — E03.9 ACQUIRED HYPOTHYROIDISM: ICD-10-CM

## 2021-04-07 LAB
ALBUMIN SERPL BCP-MCNC: 3.8 G/DL (ref 3.5–5)
ALP SERPL-CCNC: 49 U/L (ref 46–116)
ALT SERPL W P-5'-P-CCNC: 18 U/L (ref 12–78)
ANION GAP SERPL CALCULATED.3IONS-SCNC: 8 MMOL/L (ref 4–13)
AST SERPL W P-5'-P-CCNC: 19 U/L (ref 5–45)
BILIRUB SERPL-MCNC: 0.72 MG/DL (ref 0.2–1)
BUN SERPL-MCNC: 16 MG/DL (ref 5–25)
CALCIUM SERPL-MCNC: 9.5 MG/DL (ref 8.3–10.1)
CHLORIDE SERPL-SCNC: 103 MMOL/L (ref 100–108)
CHOLEST SERPL-MCNC: 199 MG/DL (ref 50–200)
CO2 SERPL-SCNC: 28 MMOL/L (ref 21–32)
CREAT SERPL-MCNC: 0.98 MG/DL (ref 0.6–1.3)
GFR SERPL CREATININE-BSD FRML MDRD: 66 ML/MIN/1.73SQ M
GLUCOSE P FAST SERPL-MCNC: 93 MG/DL (ref 65–99)
HDLC SERPL-MCNC: 85 MG/DL
LDLC SERPL CALC-MCNC: 104 MG/DL (ref 0–100)
POTASSIUM SERPL-SCNC: 3.8 MMOL/L (ref 3.5–5.3)
PROT SERPL-MCNC: 7.5 G/DL (ref 6.4–8.2)
SODIUM SERPL-SCNC: 139 MMOL/L (ref 136–145)
TRIGL SERPL-MCNC: 52 MG/DL
TSH SERPL DL<=0.05 MIU/L-ACNC: 5.4 UIU/ML (ref 0.36–3.74)

## 2021-04-07 PROCEDURE — 36415 COLL VENOUS BLD VENIPUNCTURE: CPT

## 2021-04-07 PROCEDURE — 84443 ASSAY THYROID STIM HORMONE: CPT

## 2021-04-07 PROCEDURE — 80061 LIPID PANEL: CPT

## 2021-04-07 PROCEDURE — 80053 COMPREHEN METABOLIC PANEL: CPT

## 2021-04-07 RX ORDER — LEVOTHYROXINE SODIUM 0.05 MG/1
50 TABLET ORAL DAILY
Qty: 90 TABLET | Refills: 1 | Status: SHIPPED | OUTPATIENT
Start: 2021-04-07 | End: 2021-05-22 | Stop reason: SDUPTHER

## 2021-04-07 NOTE — TELEPHONE ENCOUNTER
Patient informed  She stated that she already picked up the 25 mcg  So I told her to take two 25 mcg pills and when she runs out she can  the 50 mcg and0 repeat BW May 19 th which is 6 weeks  patient agreed    Jeff Downey MA

## 2021-04-08 ENCOUNTER — TELEPHONE (OUTPATIENT)
Dept: GASTROENTEROLOGY | Facility: CLINIC | Age: 76
End: 2021-04-08

## 2021-04-16 ENCOUNTER — TELEPHONE (OUTPATIENT)
Dept: FAMILY MEDICINE CLINIC | Facility: CLINIC | Age: 76
End: 2021-04-16

## 2021-04-16 ENCOUNTER — TELEPHONE (OUTPATIENT)
Dept: GASTROENTEROLOGY | Facility: CLINIC | Age: 76
End: 2021-04-16

## 2021-04-16 DIAGNOSIS — Z12.11 SCREENING FOR MALIGNANT NEOPLASM OF COLON: Primary | ICD-10-CM

## 2021-04-16 RX ORDER — SODIUM, POTASSIUM,MAG SULFATES 17.5-3.13G
2 SOLUTION, RECONSTITUTED, ORAL ORAL SEE ADMIN INSTRUCTIONS
Qty: 2 BOTTLE | Refills: 0 | Status: SHIPPED | OUTPATIENT
Start: 2021-04-16

## 2021-04-16 NOTE — TELEPHONE ENCOUNTER
Patient called to schedule her recall colonoscopy with Dr Gildardo Biswas  Patient is scheduled for 05/07/21 at [facility] and is aware that she needs a covid test on 05/01/21  suprep instructions mailed to patient  Sent request to have suprep sent to patient's pharmacy

## 2021-04-16 NOTE — TELEPHONE ENCOUNTER
Michelle Moreland is calling regarding blood work that she needs to have done next month  Looks like only Thyroid needs to be done? Is this correct? She thought she needed her sugar checked too?     Please call patient back     Thank you

## 2021-05-02 DIAGNOSIS — Z11.59 SCREENING FOR VIRAL DISEASE: ICD-10-CM

## 2021-05-02 PROCEDURE — U0003 INFECTIOUS AGENT DETECTION BY NUCLEIC ACID (DNA OR RNA); SEVERE ACUTE RESPIRATORY SYNDROME CORONAVIRUS 2 (SARS-COV-2) (CORONAVIRUS DISEASE [COVID-19]), AMPLIFIED PROBE TECHNIQUE, MAKING USE OF HIGH THROUGHPUT TECHNOLOGIES AS DESCRIBED BY CMS-2020-01-R: HCPCS | Performed by: INTERNAL MEDICINE

## 2021-05-02 PROCEDURE — U0005 INFEC AGEN DETEC AMPLI PROBE: HCPCS | Performed by: INTERNAL MEDICINE

## 2021-05-03 LAB — SARS-COV-2 RNA RESP QL NAA+PROBE: NEGATIVE

## 2021-05-03 RX ORDER — ASPIRIN 81 MG/1
81 TABLET ORAL DAILY
COMMUNITY

## 2021-05-03 NOTE — PRE-PROCEDURE INSTRUCTIONS
Pre-Surgery Instructions:   Medication Instructions    aspirin (ECOTRIN LOW STRENGTH) 81 mg EC tablet Instructed patient per Anesthesia Guidelines   GARLIC PO Pt stopped on 5/1/21    levothyroxine 50 mcg tablet Pt to take the am of the procedure    Na Sulfate-K Sulfate-Mg Sulf (Suprep Bowel Prep Kit) 17 5-3 13-1 6 GM/177ML SOLN Patient was instructed by Physician and understands   Omega-3 Fatty Acids (fish oil) 1,000 mg Pt stopped on 5/1/21    Turmeric 500 MG CAPS Pt stopped on 5/1/21    Pt to follow Dr Dalia Carbajal instructions    daughter Hua Vega [phone]

## 2021-05-06 ENCOUNTER — ANESTHESIA EVENT (OUTPATIENT)
Dept: GASTROENTEROLOGY | Facility: AMBULARY SURGERY CENTER | Age: 76
End: 2021-05-06

## 2021-05-07 ENCOUNTER — HOSPITAL ENCOUNTER (OUTPATIENT)
Dept: GASTROENTEROLOGY | Facility: AMBULARY SURGERY CENTER | Age: 76
Setting detail: OUTPATIENT SURGERY
Discharge: HOME | End: 2021-05-07
Attending: INTERNAL MEDICINE | Admitting: INTERNAL MEDICINE
Payer: COMMERCIAL

## 2021-05-07 ENCOUNTER — ANESTHESIA (OUTPATIENT)
Dept: GASTROENTEROLOGY | Facility: AMBULARY SURGERY CENTER | Age: 76
End: 2021-05-07

## 2021-05-07 VITALS
TEMPERATURE: 96.5 F | SYSTOLIC BLOOD PRESSURE: 155 MMHG | HEART RATE: 44 BPM | BODY MASS INDEX: 26.29 KG/M2 | DIASTOLIC BLOOD PRESSURE: 71 MMHG | HEIGHT: 64 IN | OXYGEN SATURATION: 96 % | WEIGHT: 154 LBS | RESPIRATION RATE: 18 BRPM

## 2021-05-07 DIAGNOSIS — Z86.010 HX OF COLONIC POLYPS: ICD-10-CM

## 2021-05-07 PROCEDURE — 45380 COLONOSCOPY AND BIOPSY: CPT | Performed by: INTERNAL MEDICINE

## 2021-05-07 PROCEDURE — 88305 TISSUE EXAM BY PATHOLOGIST: CPT | Performed by: PATHOLOGY

## 2021-05-07 RX ORDER — SODIUM CHLORIDE, SODIUM LACTATE, POTASSIUM CHLORIDE, CALCIUM CHLORIDE 600; 310; 30; 20 MG/100ML; MG/100ML; MG/100ML; MG/100ML
75 INJECTION, SOLUTION INTRAVENOUS CONTINUOUS
Status: DISCONTINUED | OUTPATIENT
Start: 2021-05-07 | End: 2021-05-11 | Stop reason: HOSPADM

## 2021-05-07 RX ORDER — SODIUM CHLORIDE, SODIUM LACTATE, POTASSIUM CHLORIDE, CALCIUM CHLORIDE 600; 310; 30; 20 MG/100ML; MG/100ML; MG/100ML; MG/100ML
INJECTION, SOLUTION INTRAVENOUS CONTINUOUS PRN
Status: DISCONTINUED | OUTPATIENT
Start: 2021-05-07 | End: 2021-05-07

## 2021-05-07 RX ORDER — PROPOFOL 10 MG/ML
INJECTION, EMULSION INTRAVENOUS AS NEEDED
Status: DISCONTINUED | OUTPATIENT
Start: 2021-05-07 | End: 2021-05-07

## 2021-05-07 RX ORDER — PROPOFOL 10 MG/ML
INJECTION, EMULSION INTRAVENOUS CONTINUOUS PRN
Status: DISCONTINUED | OUTPATIENT
Start: 2021-05-07 | End: 2021-05-07

## 2021-05-07 RX ADMIN — SODIUM CHLORIDE, SODIUM LACTATE, POTASSIUM CHLORIDE, AND CALCIUM CHLORIDE: .6; .31; .03; .02 INJECTION, SOLUTION INTRAVENOUS at 10:31

## 2021-05-07 RX ADMIN — SODIUM CHLORIDE, SODIUM LACTATE, POTASSIUM CHLORIDE, AND CALCIUM CHLORIDE 75 ML/HR: .6; .31; .03; .02 INJECTION, SOLUTION INTRAVENOUS at 09:58

## 2021-05-07 RX ADMIN — PROPOFOL 100 MCG/KG/MIN: 10 INJECTION, EMULSION INTRAVENOUS at 10:35

## 2021-05-07 RX ADMIN — PROPOFOL 100 MG: 10 INJECTION, EMULSION INTRAVENOUS at 10:35

## 2021-05-07 RX ADMIN — LIDOCAINE HYDROCHLORIDE 40 MG: 20 INJECTION, SOLUTION INTRAVENOUS at 10:35

## 2021-05-07 NOTE — DISCHARGE INSTRUCTIONS
SUBJECTIVE:   Bhavya Jacob is a 70 y o  female who complains of {uri sx:075358} for *** days  She denies a history of {hx resp sx additional:963840} and {has/denies:675217} a history of asthma  Patient {has/denies:122398} smoke cigarettes  OBJECTIVE:  She appears well, vital signs are as noted  Ears normal   Throat and pharynx normal   Neck supple  No adenopathy in the neck  Nose is congested  Sinuses non tender  The chest is clear, without wheezes or rales  ASSESSMENT:   {uri dx:629038::"viral upper respiratory illness"}    PLAN:  Symptomatic therapy suggested: {resp DMBM:834413::"MHDG fluids","rest","return office visit prn if symptoms persist or worsen"}  Lack of antibiotic effectiveness discussed with her  Call or return to clinic prn if these symptoms worsen or fail to improve as anticipated

## 2021-05-07 NOTE — ANESTHESIA PREPROCEDURE EVALUATION
Procedure:  COLONOSCOPY    Relevant Problems   CARDIO   (+) Murmur      ENDO   (+) Acquired hypothyroidism      MUSCULOSKELETAL   (+) Primary osteoarthritis of right knee   (+) Primary osteoarthritis of right shoulder        Physical Exam    Airway    Mallampati score: II  TM Distance: >3 FB  Neck ROM: full     Dental       Cardiovascular  Rhythm: regular, Rate: normal,     Pulmonary  Breath sounds clear to auscultation,     Other Findings        Anesthesia Plan  ASA Score- 2     Anesthesia Type- IV sedation with anesthesia with ASA Monitors  Additional Monitors:   Airway Plan:           Plan Factors-    Chart reviewed  Patient is not a current smoker  Induction- intravenous  Postoperative Plan-     Informed Consent- Anesthetic plan and risks discussed with patient  I personally reviewed this patient with the CRNA  Discussed and agreed on the Anesthesia Plan with the CRNA  Ck Sahu

## 2021-05-07 NOTE — ANESTHESIA POSTPROCEDURE EVALUATION
Post-Op Assessment Note    CV Status:  Stable  Pain Score: 0    Pain management: adequate     Mental Status:  Sleepy   Hydration Status:  Stable and euvolemic   PONV Controlled:  None   Airway Patency:  Patent       Staff: CRNA         No complications documented      BP  137/63   Temp 36 0C   Pulse 50   Resp 20   SpO2 99%6lfm

## 2021-05-07 NOTE — H&P
History and Physical -  [facility]  Shanthi Curry 70 y o  female MRN: [id number]    HPI: Shanthi Curry is a 70y o  year old female who presents with history of tubular adenoma in 2016      Review of Systems    Historical Information   Past Medical History:   Diagnosis Date    Arthritis     right knee, leg    Colon polyp     Disease of thyroid gland     hypo    Leukocytosis     last assessed: 10/21/2016    Wears glasses      Past Surgical History:   Procedure Laterality Date     SECTION      5 c sec    COLONOSCOPY N/A 2016    Procedure: COLONOSCOPY;  Surgeon: Michelle Hebert MD;  Location: [place] [address] [facility];   Service:      Social History   Social History     Substance and Sexual Activity   Alcohol Use Yes    Frequency: Monthly or less     Social History     Substance and Sexual Activity   Drug Use No     Social History     Tobacco Use   Smoking Status Never Smoker   Smokeless Tobacco Never Used     Family History   Problem Relation Age of Onset    Arthritis Mother         Knee    Hypertension Mother     Heart disease Mother         Pacemaker Placement    Hypertension Father     Heart disease Brother     No Known Problems Sister     No Known Problems Daughter     No Known Problems Sister     No Known Problems Daughter     No Known Problems Daughter     No Known Problems Daughter     Mental illness Neg Hx        Meds/Allergies     (Not in a hospital admission)      No Known Allergies    Objective     /76   Pulse (!) 47   Temp (!) 96 5 °F (35 8 °C) (Temporal)   Resp 18   Ht 5' 4" (1 626 m)   Wt 69 9 kg (154 lb)   SpO2 92%   BMI 26 43 kg/m²       PHYSICAL EXAM    Gen: NAD, NCAT  CV: RRR  CHEST: Clear  ABD: soft, NT/ND  EXT: no edema  Neuro: AAO      ASSESSMENT/PLAN:  This is a 70y o  year old female here for  history of tubular adenoma in 2016      PLAN:   Procedure:  Colonoscopy

## 2021-05-21 ENCOUNTER — TELEPHONE (OUTPATIENT)
Dept: GASTROENTEROLOGY | Facility: AMBULARY SURGERY CENTER | Age: 76
End: 2021-05-21

## 2021-05-21 NOTE — TELEPHONE ENCOUNTER
----- Message from True Pedroza MD sent at 5/21/2021  8:52 AM EDT -----  Please inform the patient that all 4 polyps removed were tubular adenomas  There was no high-grade dysplasia, no cancer  I recommend repeat colonoscopy in 3 years, note that this is a change rather than the 5 years originally noted on her report  I have updated her procedure report to reflect this

## 2021-05-22 DIAGNOSIS — E03.9 ACQUIRED HYPOTHYROIDISM: ICD-10-CM

## 2021-05-22 RX ORDER — LEVOTHYROXINE SODIUM 0.05 MG/1
50 TABLET ORAL DAILY
Qty: 90 TABLET | Refills: 1 | Status: SHIPPED | OUTPATIENT
Start: 2021-05-22 | End: 2021-06-07 | Stop reason: SDUPTHER

## 2021-05-24 ENCOUNTER — TELEPHONE (OUTPATIENT)
Dept: GASTROENTEROLOGY | Facility: AMBULARY SURGERY CENTER | Age: 76
End: 2021-05-24

## 2021-05-24 NOTE — TELEPHONE ENCOUNTER
----- Message from Renetta Dos Santos MD sent at 5/21/2021  8:52 AM EDT -----  Please inform the patient that all 4 polyps removed were tubular adenomas  There was no high-grade dysplasia, no cancer  I recommend repeat colonoscopy in 3 years, note that this is a change rather than the 5 years originally noted on her report  I have updated her procedure report to reflect this  hip pain/arm pain/injury

## 2021-05-25 ENCOUNTER — TELEPHONE (OUTPATIENT)
Dept: GASTROENTEROLOGY | Facility: AMBULARY SURGERY CENTER | Age: 76
End: 2021-05-25

## 2021-05-25 NOTE — TELEPHONE ENCOUNTER
----- Message from Kristen Posey MD sent at 5/21/2021  8:52 AM EDT -----  Please inform the patient that all 4 polyps removed were tubular adenomas  There was no high-grade dysplasia, no cancer  I recommend repeat colonoscopy in 3 years, note that this is a change rather than the 5 years originally noted on her report  I have updated her procedure report to reflect this

## 2021-05-25 NOTE — TELEPHONE ENCOUNTER
----- Message from Douglas Phelps MD sent at 5/21/2021  8:52 AM EDT -----  Please inform the patient that all 4 polyps removed were tubular adenomas  There was no high-grade dysplasia, no cancer  I recommend repeat colonoscopy in 3 years, note that this is a change rather than the 5 years originally noted on her report  I have updated her procedure report to reflect this

## 2021-05-25 NOTE — TELEPHONE ENCOUNTER
Called other number in file  Pt aware of results and verbalized understanding  Pt aware 3 yr colonoscopy

## 2021-06-07 ENCOUNTER — LAB (OUTPATIENT)
Dept: LAB | Facility: HOSPITAL | Age: 76
End: 2021-06-07
Attending: FAMILY MEDICINE
Payer: COMMERCIAL

## 2021-06-07 ENCOUNTER — TELEPHONE (OUTPATIENT)
Dept: FAMILY MEDICINE CLINIC | Facility: CLINIC | Age: 76
End: 2021-06-07

## 2021-06-07 ENCOUNTER — TRANSCRIBE ORDERS (OUTPATIENT)
Dept: ADMINISTRATIVE | Facility: HOSPITAL | Age: 76
End: 2021-06-07

## 2021-06-07 DIAGNOSIS — E03.9 ACQUIRED HYPOTHYROIDISM: ICD-10-CM

## 2021-06-07 LAB — TSH SERPL DL<=0.05 MIU/L-ACNC: 0.05 UIU/ML (ref 0.36–3.74)

## 2021-06-07 PROCEDURE — 36415 COLL VENOUS BLD VENIPUNCTURE: CPT

## 2021-06-07 PROCEDURE — 84443 ASSAY THYROID STIM HORMONE: CPT

## 2021-06-07 RX ORDER — LEVOTHYROXINE SODIUM 0.03 MG/1
25 TABLET ORAL DAILY
Qty: 90 TABLET | Refills: 1 | Status: SHIPPED | OUTPATIENT
Start: 2021-06-07 | End: 2021-10-27 | Stop reason: SDUPTHER

## 2021-06-07 NOTE — TELEPHONE ENCOUNTER
Please call pt let her know her thyroid is over treated  I will decrease her thyuroid med and follow labs in 6 weeks

## 2021-08-12 ENCOUNTER — APPOINTMENT (OUTPATIENT)
Dept: LAB | Facility: HOSPITAL | Age: 76
End: 2021-08-12
Attending: FAMILY MEDICINE
Payer: COMMERCIAL

## 2021-08-12 DIAGNOSIS — E03.9 ACQUIRED HYPOTHYROIDISM: ICD-10-CM

## 2021-08-12 LAB — TSH SERPL DL<=0.05 MIU/L-ACNC: 0.81 UIU/ML (ref 0.36–3.74)

## 2021-08-12 PROCEDURE — 36415 COLL VENOUS BLD VENIPUNCTURE: CPT

## 2021-08-12 PROCEDURE — 84443 ASSAY THYROID STIM HORMONE: CPT

## 2021-10-14 ENCOUNTER — TELEPHONE (OUTPATIENT)
Dept: FAMILY MEDICINE CLINIC | Facility: CLINIC | Age: 76
End: 2021-10-14

## 2021-10-27 DIAGNOSIS — E03.9 ACQUIRED HYPOTHYROIDISM: ICD-10-CM

## 2021-10-27 RX ORDER — LEVOTHYROXINE SODIUM 0.03 MG/1
25 TABLET ORAL DAILY
Qty: 90 TABLET | Refills: 1 | Status: SHIPPED | OUTPATIENT
Start: 2021-10-27 | End: 2022-06-21 | Stop reason: SDUPTHER

## 2021-11-04 ENCOUNTER — TELEPHONE (OUTPATIENT)
Dept: FAMILY MEDICINE CLINIC | Facility: CLINIC | Age: 76
End: 2021-11-04

## 2021-11-04 ENCOUNTER — OFFICE VISIT (OUTPATIENT)
Dept: FAMILY MEDICINE CLINIC | Facility: CLINIC | Age: 76
End: 2021-11-04
Payer: COMMERCIAL

## 2021-11-04 VITALS
BODY MASS INDEX: 27.14 KG/M2 | SYSTOLIC BLOOD PRESSURE: 148 MMHG | HEART RATE: 50 BPM | TEMPERATURE: 97.5 F | HEIGHT: 64 IN | OXYGEN SATURATION: 98 % | DIASTOLIC BLOOD PRESSURE: 100 MMHG | RESPIRATION RATE: 16 BRPM | WEIGHT: 159 LBS

## 2021-11-04 DIAGNOSIS — I10 BENIGN ESSENTIAL HYPERTENSION: ICD-10-CM

## 2021-11-04 DIAGNOSIS — K92.1 BLOOD IN STOOL: Primary | ICD-10-CM

## 2021-11-04 DIAGNOSIS — E03.9 ACQUIRED HYPOTHYROIDISM: ICD-10-CM

## 2021-11-04 DIAGNOSIS — Z00.00 MEDICARE ANNUAL WELLNESS VISIT, SUBSEQUENT: ICD-10-CM

## 2021-11-04 DIAGNOSIS — K64.4 EXTERNAL HEMORRHOIDS: ICD-10-CM

## 2021-11-04 DIAGNOSIS — K64.4 EXTERNAL HEMORRHOIDS: Primary | ICD-10-CM

## 2021-11-04 DIAGNOSIS — Z23 NEED FOR VACCINATION: ICD-10-CM

## 2021-11-04 PROCEDURE — 99214 OFFICE O/P EST MOD 30 MIN: CPT | Performed by: FAMILY MEDICINE

## 2021-11-04 PROCEDURE — G0008 ADMIN INFLUENZA VIRUS VAC: HCPCS

## 2021-11-04 PROCEDURE — 3725F SCREEN DEPRESSION PERFORMED: CPT | Performed by: FAMILY MEDICINE

## 2021-11-04 PROCEDURE — 1125F AMNT PAIN NOTED PAIN PRSNT: CPT | Performed by: FAMILY MEDICINE

## 2021-11-04 PROCEDURE — G0439 PPPS, SUBSEQ VISIT: HCPCS | Performed by: FAMILY MEDICINE

## 2021-11-04 PROCEDURE — 1170F FXNL STATUS ASSESSED: CPT | Performed by: FAMILY MEDICINE

## 2021-11-04 PROCEDURE — 3288F FALL RISK ASSESSMENT DOCD: CPT | Performed by: FAMILY MEDICINE

## 2021-11-04 PROCEDURE — 90662 IIV NO PRSV INCREASED AG IM: CPT

## 2021-11-04 PROCEDURE — 1101F PT FALLS ASSESS-DOCD LE1/YR: CPT | Performed by: FAMILY MEDICINE

## 2021-11-04 RX ORDER — HYDROCHLOROTHIAZIDE 12.5 MG/1
12.5 TABLET ORAL DAILY
Qty: 90 TABLET | Refills: 1 | Status: SHIPPED | OUTPATIENT
Start: 2021-11-04 | End: 2022-06-21 | Stop reason: SDUPTHER

## 2021-11-04 RX ORDER — MELATONIN
1000 DAILY
COMMUNITY

## 2021-11-05 ENCOUNTER — APPOINTMENT (OUTPATIENT)
Dept: LAB | Facility: HOSPITAL | Age: 76
End: 2021-11-05
Attending: FAMILY MEDICINE
Payer: COMMERCIAL

## 2021-11-05 DIAGNOSIS — I10 BENIGN ESSENTIAL HYPERTENSION: ICD-10-CM

## 2021-11-05 DIAGNOSIS — E03.9 ACQUIRED HYPOTHYROIDISM: ICD-10-CM

## 2021-11-05 LAB
ALBUMIN SERPL BCP-MCNC: 4 G/DL (ref 3.5–5)
ALP SERPL-CCNC: 54 U/L (ref 46–116)
ALT SERPL W P-5'-P-CCNC: 23 U/L (ref 12–78)
ANION GAP SERPL CALCULATED.3IONS-SCNC: 8 MMOL/L (ref 4–13)
AST SERPL W P-5'-P-CCNC: 18 U/L (ref 5–45)
BILIRUB SERPL-MCNC: 1.11 MG/DL (ref 0.2–1)
BUN SERPL-MCNC: 13 MG/DL (ref 5–25)
CALCIUM SERPL-MCNC: 9.4 MG/DL (ref 8.3–10.1)
CHLORIDE SERPL-SCNC: 104 MMOL/L (ref 100–108)
CHOLEST SERPL-MCNC: 176 MG/DL (ref 50–200)
CO2 SERPL-SCNC: 28 MMOL/L (ref 21–32)
CREAT SERPL-MCNC: 0.9 MG/DL (ref 0.6–1.3)
GFR SERPL CREATININE-BSD FRML MDRD: 73 ML/MIN/1.73SQ M
GLUCOSE P FAST SERPL-MCNC: 86 MG/DL (ref 65–99)
HDLC SERPL-MCNC: 91 MG/DL
LDLC SERPL CALC-MCNC: 75 MG/DL (ref 0–100)
POTASSIUM SERPL-SCNC: 3.9 MMOL/L (ref 3.5–5.3)
PROT SERPL-MCNC: 7.8 G/DL (ref 6.4–8.2)
SODIUM SERPL-SCNC: 140 MMOL/L (ref 136–145)
TRIGL SERPL-MCNC: 51 MG/DL
TSH SERPL DL<=0.05 MIU/L-ACNC: 1.26 UIU/ML (ref 0.36–3.74)

## 2021-11-05 PROCEDURE — 80061 LIPID PANEL: CPT

## 2021-11-05 PROCEDURE — 36415 COLL VENOUS BLD VENIPUNCTURE: CPT

## 2021-11-05 PROCEDURE — 80053 COMPREHEN METABOLIC PANEL: CPT

## 2021-11-05 PROCEDURE — 84443 ASSAY THYROID STIM HORMONE: CPT

## 2021-11-16 ENCOUNTER — CONSULT (OUTPATIENT)
Dept: GASTROENTEROLOGY | Facility: CLINIC | Age: 76
End: 2021-11-16
Payer: COMMERCIAL

## 2021-11-16 VITALS
HEART RATE: 54 BPM | TEMPERATURE: 97.6 F | WEIGHT: 161 LBS | DIASTOLIC BLOOD PRESSURE: 84 MMHG | HEIGHT: 64 IN | SYSTOLIC BLOOD PRESSURE: 141 MMHG | BODY MASS INDEX: 27.49 KG/M2

## 2021-11-16 DIAGNOSIS — K62.5 RECTAL BLEEDING: ICD-10-CM

## 2021-11-16 DIAGNOSIS — K64.4 EXTERNAL HEMORRHOIDS: ICD-10-CM

## 2021-11-16 PROCEDURE — 3008F BODY MASS INDEX DOCD: CPT | Performed by: PHYSICIAN ASSISTANT

## 2021-11-16 PROCEDURE — 1160F RVW MEDS BY RX/DR IN RCRD: CPT | Performed by: PHYSICIAN ASSISTANT

## 2021-11-16 PROCEDURE — 99213 OFFICE O/P EST LOW 20 MIN: CPT | Performed by: PHYSICIAN ASSISTANT

## 2021-11-17 ENCOUNTER — RA CDI HCC (OUTPATIENT)
Dept: OTHER | Facility: HOSPITAL | Age: 76
End: 2021-11-17

## 2021-11-30 ENCOUNTER — HOSPITAL ENCOUNTER (OUTPATIENT)
Dept: RADIOLOGY | Facility: HOSPITAL | Age: 76
Discharge: HOME | End: 2021-11-30
Attending: FAMILY MEDICINE
Payer: COMMERCIAL

## 2021-11-30 VITALS — BODY MASS INDEX: 28.51 KG/M2 | HEIGHT: 64 IN | WEIGHT: 167 LBS

## 2021-11-30 DIAGNOSIS — Z12.31 SCREENING MAMMOGRAM FOR HIGH-RISK PATIENT: ICD-10-CM

## 2021-11-30 DIAGNOSIS — R92.2 DENSE BREAST TISSUE: ICD-10-CM

## 2021-11-30 PROCEDURE — 77067 SCR MAMMO BI INCL CAD: CPT

## 2021-11-30 PROCEDURE — 77063 BREAST TOMOSYNTHESIS BI: CPT

## 2021-12-02 ENCOUNTER — OFFICE VISIT (OUTPATIENT)
Dept: FAMILY MEDICINE CLINIC | Facility: CLINIC | Age: 76
End: 2021-12-02
Payer: COMMERCIAL

## 2021-12-02 VITALS
HEIGHT: 64 IN | HEART RATE: 54 BPM | RESPIRATION RATE: 16 BRPM | DIASTOLIC BLOOD PRESSURE: 60 MMHG | TEMPERATURE: 97.4 F | OXYGEN SATURATION: 98 % | SYSTOLIC BLOOD PRESSURE: 110 MMHG | BODY MASS INDEX: 27.14 KG/M2 | WEIGHT: 159 LBS

## 2021-12-02 DIAGNOSIS — E03.9 ACQUIRED HYPOTHYROIDISM: ICD-10-CM

## 2021-12-02 DIAGNOSIS — I10 BENIGN ESSENTIAL HYPERTENSION: Primary | ICD-10-CM

## 2021-12-02 PROCEDURE — 3008F BODY MASS INDEX DOCD: CPT | Performed by: FAMILY MEDICINE

## 2021-12-02 PROCEDURE — 3078F DIAST BP <80 MM HG: CPT | Performed by: FAMILY MEDICINE

## 2021-12-02 PROCEDURE — 99213 OFFICE O/P EST LOW 20 MIN: CPT | Performed by: FAMILY MEDICINE

## 2021-12-02 PROCEDURE — 3074F SYST BP LT 130 MM HG: CPT | Performed by: FAMILY MEDICINE

## 2021-12-02 PROCEDURE — 1036F TOBACCO NON-USER: CPT | Performed by: FAMILY MEDICINE

## 2021-12-02 PROCEDURE — 1160F RVW MEDS BY RX/DR IN RCRD: CPT | Performed by: FAMILY MEDICINE

## 2022-05-26 ENCOUNTER — RA CDI HCC (OUTPATIENT)
Dept: OTHER | Facility: HOSPITAL | Age: 77
End: 2022-05-26

## 2022-05-26 NOTE — PROGRESS NOTES
[facility]  coding opportunities       Chart reviewed, no opportunity found:   [address]        Patients Insurance     Medicare Insurance: [facility]

## 2022-06-10 ENCOUNTER — OFFICE VISIT (OUTPATIENT)
Dept: FAMILY MEDICINE CLINIC | Facility: CLINIC | Age: 77
End: 2022-06-10
Payer: COMMERCIAL

## 2022-06-10 VITALS
SYSTOLIC BLOOD PRESSURE: 132 MMHG | BODY MASS INDEX: 26.63 KG/M2 | HEIGHT: 64 IN | TEMPERATURE: 97.4 F | HEART RATE: 64 BPM | WEIGHT: 156 LBS | RESPIRATION RATE: 18 BRPM | DIASTOLIC BLOOD PRESSURE: 68 MMHG | OXYGEN SATURATION: 98 %

## 2022-06-10 DIAGNOSIS — D70.9 CHRONIC IDIOPATHIC NEUTROPENIA (HCC): ICD-10-CM

## 2022-06-10 DIAGNOSIS — R51.9 NONINTRACTABLE HEADACHE, UNSPECIFIED CHRONICITY PATTERN, UNSPECIFIED HEADACHE TYPE: Primary | ICD-10-CM

## 2022-06-10 DIAGNOSIS — Z78.0 POST-MENOPAUSAL: Primary | ICD-10-CM

## 2022-06-10 DIAGNOSIS — I27.20 PULMONARY HYPERTENSION (HCC): ICD-10-CM

## 2022-06-10 PROCEDURE — 3075F SYST BP GE 130 - 139MM HG: CPT | Performed by: FAMILY MEDICINE

## 2022-06-10 PROCEDURE — 1036F TOBACCO NON-USER: CPT | Performed by: FAMILY MEDICINE

## 2022-06-10 PROCEDURE — 99213 OFFICE O/P EST LOW 20 MIN: CPT | Performed by: FAMILY MEDICINE

## 2022-06-10 PROCEDURE — 3725F SCREEN DEPRESSION PERFORMED: CPT | Performed by: FAMILY MEDICINE

## 2022-06-10 PROCEDURE — 3008F BODY MASS INDEX DOCD: CPT | Performed by: FAMILY MEDICINE

## 2022-06-10 PROCEDURE — 1160F RVW MEDS BY RX/DR IN RCRD: CPT | Performed by: FAMILY MEDICINE

## 2022-06-10 PROCEDURE — 3078F DIAST BP <80 MM HG: CPT | Performed by: FAMILY MEDICINE

## 2022-06-10 RX ORDER — MELOXICAM 15 MG/1
15 TABLET ORAL DAILY PRN
Qty: 30 TABLET | Refills: 0 | Status: SHIPPED | OUTPATIENT
Start: 2022-06-10 | End: 2022-09-08

## 2022-06-10 NOTE — PROGRESS NOTES
s being sched today for HA  Assessment/Plan:    1  Nonintractable headache, unspecified chronicity pattern, unspecified headache type  -     C-reactive protein; Future  -     CBC; Future  -     Comprehensive metabolic panel; Future  -     TSH, 3rd generation; Future  -     meloxicam (MOBIC) 15 mg tablet; Take 1 tablet (15 mg total) by mouth daily as needed for moderate pain As needed    2  Chronic idiopathic neutropenia (HCC)    3  Pulmonary hypertension (HCC)  Fundal exam was acceptable  mark teat Daly episodically with mobic  Pt has not had HA for three days  No focal deficits        There are no Patient Instructions on file for this visit  No follow-ups on file  Subjective:      Patient ID: Myrtle Stringer is a 78 y o  female  Chief Complaint   Patient presents with    Headache     Past two days has had headache  Headaches have been longer recently  Mz cma       Pt is being seen today for HA  PT states she has had a series of HA starting last week - on Sunday some days not to bad other days worst  HA was in the rt temporal area  No affect on vision  She took tyleno - and that would clear it in an hour  Has not had one for three days  Many years ago she did have HA      The following portions of the patient's history were reviewed and updated as appropriate: allergies, current medications, past family history, past medical history, past social history, past surgical history and problem list     Review of Systems   Constitutional: Negative  Negative for activity change, appetite change, chills, diaphoresis and fatigue  HENT: Negative  Negative for dental problem, ear pain, sinus pressure and sore throat  Eyes: Negative  Negative for photophobia, pain, discharge, redness, itching and visual disturbance  Respiratory: Negative for apnea and chest tightness  Cardiovascular: Negative  Negative for chest pain, palpitations and leg swelling  Gastrointestinal: Negative    Negative for abdominal distention, abdominal pain, constipation and diarrhea  Endocrine: Negative  Negative for cold intolerance and heat intolerance  Genitourinary: Negative  Negative for difficulty urinating and dyspareunia  Musculoskeletal: Negative  Negative for arthralgias and back pain  Skin: Negative  Allergic/Immunologic: Negative for environmental allergies  Neurological: Positive for headaches  Negative for dizziness  Psychiatric/Behavioral: Negative  Negative for agitation  Current Outpatient Medications   Medication Sig Dispense Refill    aspirin (ECOTRIN LOW STRENGTH) 81 mg EC tablet Take 81 mg by mouth daily      cholecalciferol (VITAMIN D3) 1,000 units tablet Take 1,000 Units by mouth daily      GARLIC PO Take by mouth daily Last dose 5/1/21      hydrochlorothiazide (HYDRODIURIL) 12 5 mg tablet Take 1 tablet (12 5 mg total) by mouth daily 90 tablet 1    levothyroxine 25 mcg tablet Take 1 tablet (25 mcg total) by mouth daily 90 tablet 1    meloxicam (MOBIC) 15 mg tablet Take 1 tablet (15 mg total) by mouth daily as needed for moderate pain As needed 30 tablet 0    Omega-3 Fatty Acids (fish oil) 1,000 mg Take 1,000 mg by mouth daily Last dose 5/1/21      Turmeric 500 MG CAPS Take 500 mg by mouth daily Last dose 5/1/21      Na Sulfate-K Sulfate-Mg Sulf (Suprep Bowel Prep Kit) 17 5-3 13-1 6 GM/177ML SOLN Take 2 Bottles by mouth see administration instructions Suprep bowel prep  Please follow the instructions from the office (Patient not taking: No sig reported) 2 Bottle 0     No current facility-administered medications for this visit  Objective:    /68   Pulse 64   Temp (!) 97 4 °F (36 3 °C)   Resp 18   Ht 5' 4" (1 626 m)   Wt 70 8 kg (156 lb)   SpO2 98%   BMI 26 78 kg/m²        Physical Exam  Vitals and nursing note reviewed  Constitutional:       General: She is not in acute distress  Appearance: She is well-developed  She is not diaphoretic     HENT:      Head: Normocephalic and atraumatic  Right Ear: External ear normal       Left Ear: External ear normal       Nose: Nose normal       Mouth/Throat:      Pharynx: No oropharyngeal exudate  Eyes:      General: No scleral icterus  Right eye: No discharge  Left eye: No discharge  Extraocular Movements: Extraocular movements intact  Conjunctiva/sclera: Conjunctivae normal       Pupils: Pupils are equal, round, and reactive to light  Comments: Fundus exam is acceptable   Neck:      Thyroid: No thyromegaly  Cardiovascular:      Rate and Rhythm: Normal rate  Heart sounds: Normal heart sounds  No murmur heard  Pulmonary:      Effort: Pulmonary effort is normal  No respiratory distress  Breath sounds: Normal breath sounds  No wheezing  Abdominal:      General: Bowel sounds are normal  There is no distension  Palpations: Abdomen is soft  There is no mass  Tenderness: There is no abdominal tenderness  There is no guarding or rebound  Musculoskeletal:         General: Normal range of motion  Skin:     General: Skin is warm and dry  Findings: No erythema or rash  Neurological:      Mental Status: She is alert        Coordination: Coordination normal       Deep Tendon Reflexes: Reflexes normal    Psychiatric:         Behavior: Behavior normal                 Mauricio Claros DO

## 2022-06-11 ENCOUNTER — APPOINTMENT (OUTPATIENT)
Dept: LAB | Facility: HOSPITAL | Age: 77
End: 2022-06-11
Payer: COMMERCIAL

## 2022-06-11 DIAGNOSIS — R51.9 NONINTRACTABLE HEADACHE, UNSPECIFIED CHRONICITY PATTERN, UNSPECIFIED HEADACHE TYPE: ICD-10-CM

## 2022-06-11 LAB
ALBUMIN SERPL BCP-MCNC: 3.5 G/DL (ref 3.5–5)
ALP SERPL-CCNC: 51 U/L (ref 46–116)
ALT SERPL W P-5'-P-CCNC: 20 U/L (ref 12–78)
ANION GAP SERPL CALCULATED.3IONS-SCNC: 8 MMOL/L (ref 4–13)
AST SERPL W P-5'-P-CCNC: 16 U/L (ref 5–45)
BILIRUB SERPL-MCNC: 0.49 MG/DL (ref 0.2–1)
BUN SERPL-MCNC: 18 MG/DL (ref 5–25)
CALCIUM SERPL-MCNC: 8.8 MG/DL (ref 8.3–10.1)
CHLORIDE SERPL-SCNC: 103 MMOL/L (ref 100–108)
CO2 SERPL-SCNC: 29 MMOL/L (ref 21–32)
CREAT SERPL-MCNC: 0.84 MG/DL (ref 0.6–1.3)
CRP SERPL QL: <0.5 MG/L
ERYTHROCYTE [DISTWIDTH] IN BLOOD BY AUTOMATED COUNT: 13.4 % (ref 11.6–15.1)
GFR SERPL CREATININE-BSD FRML MDRD: 68 ML/MIN/1.73SQ M
GLUCOSE P FAST SERPL-MCNC: 96 MG/DL (ref 65–99)
HCT VFR BLD AUTO: 37.6 % (ref 34.8–46.1)
HGB BLD-MCNC: 12.7 G/DL (ref 11.5–15.4)
MCH RBC QN AUTO: 29.3 PG (ref 26.8–34.3)
MCHC RBC AUTO-ENTMCNC: 33.8 G/DL (ref 31.4–37.4)
MCV RBC AUTO: 87 FL (ref 82–98)
PLATELET # BLD AUTO: 214 THOUSANDS/UL (ref 149–390)
PMV BLD AUTO: 9.3 FL (ref 8.9–12.7)
POTASSIUM SERPL-SCNC: 3.5 MMOL/L (ref 3.5–5.3)
PROT SERPL-MCNC: 7 G/DL (ref 6.4–8.2)
RBC # BLD AUTO: 4.33 MILLION/UL (ref 3.81–5.12)
SODIUM SERPL-SCNC: 140 MMOL/L (ref 136–145)
TSH SERPL DL<=0.05 MIU/L-ACNC: 2.18 UIU/ML (ref 0.45–4.5)
WBC # BLD AUTO: 3.21 THOUSAND/UL (ref 4.31–10.16)

## 2022-06-11 PROCEDURE — 36415 COLL VENOUS BLD VENIPUNCTURE: CPT

## 2022-06-11 PROCEDURE — 84443 ASSAY THYROID STIM HORMONE: CPT

## 2022-06-11 PROCEDURE — 80053 COMPREHEN METABOLIC PANEL: CPT

## 2022-06-11 PROCEDURE — 85027 COMPLETE CBC AUTOMATED: CPT

## 2022-06-11 PROCEDURE — 86140 C-REACTIVE PROTEIN: CPT

## 2022-06-21 DIAGNOSIS — I10 BENIGN ESSENTIAL HYPERTENSION: ICD-10-CM

## 2022-06-21 DIAGNOSIS — E03.9 ACQUIRED HYPOTHYROIDISM: ICD-10-CM

## 2022-06-21 RX ORDER — LEVOTHYROXINE SODIUM 0.03 MG/1
25 TABLET ORAL DAILY
Qty: 90 TABLET | Refills: 1 | Status: SHIPPED | OUTPATIENT
Start: 2022-06-21

## 2022-06-21 RX ORDER — HYDROCHLOROTHIAZIDE 12.5 MG/1
12.5 TABLET ORAL DAILY
Qty: 90 TABLET | Refills: 1 | Status: SHIPPED | OUTPATIENT
Start: 2022-06-21 | End: 2022-12-18

## 2022-11-19 DIAGNOSIS — I10 BENIGN ESSENTIAL HYPERTENSION: ICD-10-CM

## 2022-11-19 DIAGNOSIS — E03.9 ACQUIRED HYPOTHYROIDISM: ICD-10-CM

## 2022-11-21 RX ORDER — HYDROCHLOROTHIAZIDE 12.5 MG/1
12.5 TABLET ORAL DAILY
Qty: 90 TABLET | Refills: 1 | Status: SHIPPED | OUTPATIENT
Start: 2022-11-21 | End: 2023-05-20

## 2022-11-21 RX ORDER — LEVOTHYROXINE SODIUM 0.03 MG/1
25 TABLET ORAL DAILY
Qty: 90 TABLET | Refills: 1 | Status: SHIPPED | OUTPATIENT
Start: 2022-11-21

## 2023-01-24 ENCOUNTER — TELEPHONE (OUTPATIENT)
Dept: FAMILY MEDICINE CLINIC | Facility: CLINIC | Age: 78
End: 2023-01-24

## 2023-01-24 DIAGNOSIS — Z12.31 ENCOUNTER FOR SCREENING MAMMOGRAM FOR MALIGNANT NEOPLASM OF BREAST: Primary | ICD-10-CM

## 2023-01-30 RX ORDER — TRIAMCINOLONE ACETONIDE 1 MG/G
1 CREAM TOPICAL 2 TIMES DAILY
COMMUNITY
Start: 2022-11-26

## 2023-01-30 NOTE — PRE-PROCEDURE INSTRUCTIONS
Pre-Surgery Instructions:   Medication Instructions   • aspirin (ECOTRIN LOW STRENGTH) 81 mg EC tablet Hold day of surgery  • cholecalciferol (VITAMIN D3) 1,000 units tablet Hold day of surgery  • GARLIC PO Hold day of surgery  • hydrochlorothiazide (HYDRODIURIL) 12 5 mg tablet Hold day of surgery  • levothyroxine 25 mcg tablet Take day of surgery  • Omega-3 Fatty Acids (fish oil) 1,000 mg Hold day of surgery  • Turmeric 500 MG CAPS Hold day of surgery  Pre op instructions reviewed with pt via phone  Instructed to take levothyroxine a m of surgery   daughter Ciera Blanco [phone]  My Surgical Experience    The following information was developed to assist you to prepare for your operation  What do I need to do before coming to the hospital?  • Arrange for a responsible person to drive you to and from the hospital   • Arrange care for your children at home  Children are not allowed in the recovery areas of the hospital  • Plan to wear clothing that is easy to put on and take off  If you are having shoulder surgery, wear a shirt that buttons or zippers in the front  Bathing  o Shower the evening before and the morning of your surgery with an antibacterial soap  Please refer to the Pre Op Showering Instructions for Surgery Patients Sheet   o Remove nail polish and all body piercing jewelry  o Do not shave any body part for at least 24 hours before surgery-this includes face, arms, legs and upper body  Food  o Nothing to eat or drink after midnight the night before your surgery   This includes candy and chewing gum  o Exception: If your surgery is after 12:00pm (noon), you may have clear liquids such as 7-Up®, ginger ale, apple or cranberry juice, Jell-O®, water, or clear broth until 8:00 am  o Do not drink milk or juice with pulp on the morning before surgery  o Do not drink alcohol 24 hours before surgery  Medicine  o Follow instructions you received from your surgeon about which medicines you may take on the day of surgery  o If instructed to take medicine on the morning of surgery, take pills with just a small sip of water  Call your prescribing doctor for specific information on what to do if you take insulin    What should I bring to the hospital?    Bring:  • Crutches or a walker, if you have them, for foot or knee surgery  • A list of the daily medicines, vitamins, minerals, herbals and nutritional supplements you take  Include the dosages of medicines and the time you take them each day  • Glasses, dentures or hearing aids  • Minimal clothing; you will be wearing hospital sleepwear  • Photo ID; required to verify your identity  • If you have a Living Will or Power of , bring a copy of the documents  • If you have an ostomy, bring an extra pouch and any supplies you use    Do not bring  • Medicines or inhalers  • Money, valuables or jewelry    What other information should I know about the day of surgery? • Notify your surgeons if you develop a cold, sore throat, cough, fever, rash or any other illness  • Report to the Ambulatory Surgical/Same Day Surgery Unit  • You will be instructed to stop at Registration only if you have not been pre-registered  • Inform your  fi they do not stay that they will be asked by the staff to leave a phone number where they can be reached  • Be available to be reached before surgery  In the event the operating room schedule changes, you may be asked to come in earlier or later than expected    *It is important to tell your doctor and others involved in your health care if you are taking or have been taking any non-prescription drugs, vitamins, minerals, herbals or other nutritional supplements   Any of these may interact with some food or medicines and cause a reaction

## 2023-02-06 ENCOUNTER — HOSPITAL ENCOUNTER (OUTPATIENT)
Facility: AMBULARY SURGERY CENTER | Age: 78
Setting detail: OUTPATIENT SURGERY
Discharge: HOME | End: 2023-02-06
Attending: OPHTHALMOLOGY | Admitting: OPHTHALMOLOGY

## 2023-02-06 ENCOUNTER — ANESTHESIA (OUTPATIENT)
Dept: PERIOP | Facility: AMBULARY SURGERY CENTER | Age: 78
End: 2023-02-06

## 2023-02-06 ENCOUNTER — ANESTHESIA EVENT (OUTPATIENT)
Dept: PERIOP | Facility: AMBULARY SURGERY CENTER | Age: 78
End: 2023-02-06

## 2023-02-06 VITALS
DIASTOLIC BLOOD PRESSURE: 65 MMHG | OXYGEN SATURATION: 100 % | BODY MASS INDEX: 25.92 KG/M2 | HEART RATE: 50 BPM | TEMPERATURE: 96.7 F | RESPIRATION RATE: 16 BRPM | SYSTOLIC BLOOD PRESSURE: 146 MMHG | WEIGHT: 151 LBS

## 2023-02-06 DIAGNOSIS — H25.811 COMBINED FORMS OF AGE-RELATED CATARACT OF RIGHT EYE: Primary | ICD-10-CM

## 2023-02-06 DEVICE — ACRYSOF(R) IQ ASPHERIC NATURAL IOL, SINGLE-PIECE ACRYLIC FOLDABLE PCL, UV WITH BLUE LIGHTFILTER, 13.0MM LENGTH, 6.0MM ANTERIORASYMMETRIC BICONVEX OPTIC, PLANAR HAPTICS.
Type: IMPLANTABLE DEVICE | Site: EYE | Status: FUNCTIONAL
Brand: ACRYSOF®

## 2023-02-06 RX ORDER — GATIFLOXACIN 5 MG/ML
SOLUTION/ DROPS OPHTHALMIC AS NEEDED
Status: DISCONTINUED | OUTPATIENT
Start: 2023-02-06 | End: 2023-02-06 | Stop reason: HOSPADM

## 2023-02-06 RX ORDER — KETOROLAC TROMETHAMINE 5 MG/ML
1 SOLUTION OPHTHALMIC 4 TIMES DAILY
Qty: 5 ML | Refills: 0
Start: 2023-02-06

## 2023-02-06 RX ORDER — GATIFLOXACIN 5 MG/ML
1 SOLUTION/ DROPS OPHTHALMIC 2 TIMES DAILY
Qty: 3 ML | Refills: 0
Start: 2023-02-06

## 2023-02-06 RX ORDER — BALANCED SALT SOLUTION 6.4; .75; .48; .3; 3.9; 1.7 MG/ML; MG/ML; MG/ML; MG/ML; MG/ML; MG/ML
SOLUTION OPHTHALMIC AS NEEDED
Status: DISCONTINUED | OUTPATIENT
Start: 2023-02-06 | End: 2023-02-06 | Stop reason: HOSPADM

## 2023-02-06 RX ORDER — KETOROLAC TROMETHAMINE 5 MG/ML
1 SOLUTION OPHTHALMIC
Status: COMPLETED | OUTPATIENT
Start: 2023-02-06 | End: 2023-02-06

## 2023-02-06 RX ORDER — CYCLOPENTOLATE HYDROCHLORIDE 10 MG/ML
1 SOLUTION/ DROPS OPHTHALMIC
Status: COMPLETED | OUTPATIENT
Start: 2023-02-06 | End: 2023-02-06

## 2023-02-06 RX ORDER — PHENYLEPHRINE HCL 2.5 %
1 DROPS OPHTHALMIC (EYE)
Status: COMPLETED | OUTPATIENT
Start: 2023-02-06 | End: 2023-02-06

## 2023-02-06 RX ORDER — LIDOCAINE HYDROCHLORIDE 10 MG/ML
INJECTION, SOLUTION EPIDURAL; INFILTRATION; INTRACAUDAL; PERINEURAL AS NEEDED
Status: DISCONTINUED | OUTPATIENT
Start: 2023-02-06 | End: 2023-02-06 | Stop reason: HOSPADM

## 2023-02-06 RX ORDER — TETRACAINE HYDROCHLORIDE 5 MG/ML
SOLUTION OPHTHALMIC AS NEEDED
Status: DISCONTINUED | OUTPATIENT
Start: 2023-02-06 | End: 2023-02-06 | Stop reason: HOSPADM

## 2023-02-06 RX ORDER — LIDOCAINE HYDROCHLORIDE 20 MG/ML
1 JELLY TOPICAL
Status: COMPLETED | OUTPATIENT
Start: 2023-02-06 | End: 2023-02-06

## 2023-02-06 RX ORDER — ONDANSETRON 2 MG/ML
4 INJECTION INTRAMUSCULAR; INTRAVENOUS ONCE AS NEEDED
Status: DISCONTINUED | OUTPATIENT
Start: 2023-02-06 | End: 2023-02-06 | Stop reason: HOSPADM

## 2023-02-06 RX ORDER — MIDAZOLAM HYDROCHLORIDE 2 MG/2ML
INJECTION, SOLUTION INTRAMUSCULAR; INTRAVENOUS AS NEEDED
Status: DISCONTINUED | OUTPATIENT
Start: 2023-02-06 | End: 2023-02-06

## 2023-02-06 RX ORDER — TETRACAINE HYDROCHLORIDE 5 MG/ML
1 SOLUTION OPHTHALMIC ONCE
Status: COMPLETED | OUTPATIENT
Start: 2023-02-06 | End: 2023-02-06

## 2023-02-06 RX ADMIN — CYCLOPENTOLATE HYDROCHLORIDE 1 DROP: 10 SOLUTION/ DROPS OPHTHALMIC at 09:16

## 2023-02-06 RX ADMIN — CYCLOPENTOLATE HYDROCHLORIDE 1 DROP: 10 SOLUTION/ DROPS OPHTHALMIC at 09:31

## 2023-02-06 RX ADMIN — PHENYLEPHRINE HYDROCHLORIDE 1 DROP: 25 SOLUTION/ DROPS OPHTHALMIC at 08:46

## 2023-02-06 RX ADMIN — PHENYLEPHRINE HYDROCHLORIDE 1 DROP: 25 SOLUTION/ DROPS OPHTHALMIC at 09:31

## 2023-02-06 RX ADMIN — CYCLOPENTOLATE HYDROCHLORIDE 1 DROP: 10 SOLUTION/ DROPS OPHTHALMIC at 08:46

## 2023-02-06 RX ADMIN — KETOROLAC TROMETHAMINE 1 DROP: 5 SOLUTION OPHTHALMIC at 09:01

## 2023-02-06 RX ADMIN — LIDOCAINE HYDROCHLORIDE 1 APPLICATION: 20 JELLY TOPICAL at 08:46

## 2023-02-06 RX ADMIN — LIDOCAINE HYDROCHLORIDE 1 APPLICATION: 20 JELLY TOPICAL at 09:16

## 2023-02-06 RX ADMIN — PHENYLEPHRINE HYDROCHLORIDE 1 DROP: 25 SOLUTION/ DROPS OPHTHALMIC at 09:16

## 2023-02-06 RX ADMIN — LIDOCAINE HYDROCHLORIDE 1 APPLICATION: 20 JELLY TOPICAL at 09:01

## 2023-02-06 RX ADMIN — CYCLOPENTOLATE HYDROCHLORIDE 1 DROP: 10 SOLUTION/ DROPS OPHTHALMIC at 09:01

## 2023-02-06 RX ADMIN — KETOROLAC TROMETHAMINE 1 DROP: 5 SOLUTION OPHTHALMIC at 09:31

## 2023-02-06 RX ADMIN — TETRACAINE HYDROCHLORIDE 1 DROP: 5 SOLUTION OPHTHALMIC at 08:45

## 2023-02-06 RX ADMIN — PHENYLEPHRINE HYDROCHLORIDE 1 DROP: 25 SOLUTION/ DROPS OPHTHALMIC at 09:01

## 2023-02-06 RX ADMIN — MIDAZOLAM 2 MG: 1 INJECTION INTRAMUSCULAR; INTRAVENOUS at 09:38

## 2023-02-06 RX ADMIN — KETOROLAC TROMETHAMINE 1 DROP: 5 SOLUTION OPHTHALMIC at 09:16

## 2023-02-06 RX ADMIN — KETOROLAC TROMETHAMINE 1 DROP: 5 SOLUTION OPHTHALMIC at 08:46

## 2023-02-06 NOTE — ANESTHESIA PREPROCEDURE EVALUATION
Procedure:  EXTRACTION EXTRACAPSULAR CATARACT PHACO INTRAOCULAR LENS (IOL) (Right: Eye)    Relevant Problems   ANESTHESIA (within normal limits)      CARDIO   (+) Benign essential hypertension   (+) Murmur      ENDO   (+) Acquired hypothyroidism      MUSCULOSKELETAL   (+) Primary osteoarthritis of right knee   (+) Primary osteoarthritis of right shoulder      PULMONARY (within normal limits)        Physical Exam    Airway    Mallampati score: II  TM Distance: >3 FB  Neck ROM: full     Dental   No notable dental hx     Cardiovascular  Cardiovascular exam normal    Pulmonary  Pulmonary exam normal     Other Findings        Anesthesia Plan  ASA Score- 2     Anesthesia Type- IV sedation with anesthesia with ASA Monitors  Additional Monitors:   Airway Plan:           Plan Factors-Exercise tolerance (METS): >4 METS  Chart reviewed  Existing labs reviewed  Patient summary reviewed  Patient is not a current smoker  Induction-     Postoperative Plan-     Informed Consent- Anesthetic plan and risks discussed with patient  I personally reviewed this patient with the CRNA  Discussed and agreed on the Anesthesia Plan with the CRNA  Aidan Torres

## 2023-02-06 NOTE — ANESTHESIA POSTPROCEDURE EVALUATION
Post-Op Assessment Note    CV Status:  Stable  Pain Score: 0    Pain management: adequate     Mental Status:  Alert and awake   Hydration Status:  Stable   PONV Controlled:  None   Airway Patency:  Patent      Post Op Vitals Reviewed: Yes      Staff: CRNA         No notable events documented      BP     Temp     Pulse    Resp      SpO2

## 2023-02-06 NOTE — DISCHARGE INSTR - AVS FIRST PAGE
Dr Alondra Harrell Cataract Instructions    Activity:     1  No Driving until instructed   2  Keep shield on until seen tomorrow except when administering drops   3  No heavy lifting   4  No water in eye     Diet:     1  Resume normal diet    Normal Symptoms:     1  Mild Headache   2  Scratchy or picky feeling around eye    Call the office if:     1  You have any questions or concerns   2  If eye pain is not relieved by extra strength tylenol    Office phone number:  [phone]      Next appointment:     1  See Dr Alondra Harrell at his office tomorrow as scheduled   __________________________________________________________   2  Bring blue eye kit with you and eyedrops to the office    A new set of comprehensive instructions will be given and reviewed with you during your office visit tomorrow

## 2023-02-06 NOTE — DISCHARGE INSTRUCTIONS
Dr Reine Kocher Cataract Instructions    Activity:     1  No Driving until instructed   2  Keep shield on until seen tomorrow except when administering drops   3  No heavy lifting, no more than 20 lbs   4  No water in eye,for one week      Diet:     1  Resume normal diet    Normal Symptoms:     1  Mild Headache   2  Scratchy or picky feeling around eye    Call the office if:     1  You have any questions or concerns   2  If eye pain is not relieved by extra strength tylenol    Office phone number:  [phone]      Next appointment:     1  See Dr Reine Kocher at his office tomorrow as scheduled   _______02/07/2023 at 8:15 am _______________________   2  Bring blue eye kit with you and eyedrops to the office    A new set of comprehensive instructions will be given and reviewed with you during your office visit tomorrow 
Improved

## 2023-02-06 NOTE — OP NOTE
OPERATIVE REPORT    PATIENT NAME: Mile Martinez    :  1945  MRN: [id number]  Pt Location: [facility] OR ROOM 01    Surgery Date: 2023    Surgeon(s) and Role:     * Rhona Major MD - Primary    Age-related nuclear cataract, right eye [H25 11]    Post-Op Diagnosis Codes:     * Age-related nuclear cataract, right eye [H25 11]    Procedure(s):  EXTRACTION EXTRACAPSULAR CATARACT PHACO INTRAOCULAR LENS (IOL)    Anesthesia Type:   IV Sedation with Anesthesia    Operative Indications:  Age-related nuclear cataract, right eye [H25 11]  Decreased vision to 20/100  With problems reading and watching television  Pt requested cataract sx the right eye    Procedure and Technique:    Procedure Details     The patient was brought in the OR in stable condition and placed on the operative table  The right eye was prepped and draped in the usual sterile manner  Attention was directed to the right eye where a lid speculum was placed  A 2 4 mm clear corneal incision was made temperally  1/2 cc of 1% MPF Lidocaine was irrigated into the anterior chamber followed by viscoat  The side port incision was placed superiorly  The capsularrhexis was made and the nucleus was hydrodissected with BSS  The nucleus was then removed with the phaco handpiece followed by removal of the cortical material with the I/A handpiece  The capsular bag was then filled with Provisc  The IOL was folded and placed in to the capsular bag and centered well  The remaining Provisc was removed from the eye with the I/A  The wounds were hydrated with BSS and found to be water tight  The lid speculum was removed and 2 drops of Gatifloxicin were placed over the cornea  A protective eye shield was taped over the eye and the patient went to PACU in stable condition  I will see the patient in the office tomorrow and the expected post op period is a few weeks         Complications: None        Disposition: PACU   Condition: Stable    SIGNATURE: Rhona Major MD  DATE: February 6, 2023  TIME: 10:05 AM

## 2023-03-02 RX ORDER — PREDNISOLONE ACETATE 10 MG/ML
SUSPENSION/ DROPS OPHTHALMIC
COMMUNITY
Start: 2023-01-25 | End: 2023-03-13 | Stop reason: HOSPADM

## 2023-03-02 NOTE — PRE-PROCEDURE INSTRUCTIONS
My Surgical Experience    The following information was developed to assist you to prepare for your operation  What do I need to do before coming to the hospital?  • Arrange for a responsible person to drive you to and from the hospital   • Arrange care for your children at home  Children are not allowed in the recovery areas of the hospital  • Plan to wear clothing that is easy to put on and take off  If you are having shoulder surgery, wear a shirt that buttons or zippers in the front  Bathing  o Shower the evening before and the morning of your surgery with an antibacterial soap  Please refer to the Pre Op Showering Instructions for Surgery Patients Sheet   o Remove nail polish and all body piercing jewelry  o Do not shave any body part for at least 24 hours before surgery-this includes face, arms, legs and upper body  Food  o Nothing to eat or drink after midnight the night before your surgery  This includes candy and chewing gum  o Exception: If your surgery is after 12:00pm (noon), you may have clear liquids such as 7-Up®, ginger ale, apple or cranberry juice, Jell-O®, water, or clear broth until 8:00 am  o Do not drink milk or juice with pulp on the morning before surgery  o Do not drink alcohol 24 hours before surgery  Medicine  o Follow instructions you received from your surgeon about which medicines you may take on the day of surgery  o If instructed to take medicine on the morning of surgery, take pills with just a small sip of water  Call your prescribing doctor for specific infroamtion on what to do if you take insulin    What should I bring to the hospital?    Bring:  • Crutches or a walker, if you have them, for foot or knee surgery  • A list of the daily medicines, vitamins, minerals, herbals and nutritional supplements you take   Include the dosages of medicines and the time you take them each day  • Glasses, dentures or hearing aids  • Minimal clothing; you will be wearing hospital sleepwear  • Photo ID; required to verify your identity  • If you have a Living Will or Power of , bring a copy of the documents  • If you have an ostomy, bring an extra pouch and any supplies you use    Do not bring  • Medicines or inhalers  • Money, valuables or jewelry    What other information should I know about the day of surgery? • Notify your surgeons if you develop a cold, sore throat, cough, fever, rash or any other illness  • Report to the Ambulatory Surgical/Same Day Surgery Unit  • You will be instructed to stop at Registration only if you have not been pre-registered  • Inform your  fi they do not stay that they will be asked by the staff to leave a phone number where they can be reached  • Be available to be reached before surgery  In the event the operating room schedule changes, you may be asked to come in earlier or later than expected    *It is important to tell your doctor and others involved in your health care if you are taking or have been taking any non-prescription drugs, vitamins, minerals, herbals or other nutritional supplements  Any of these may interact with some food or medicines and cause a reaction      Pre-Surgery Instructions:   Medication Instructions   • aspirin (ECOTRIN LOW STRENGTH) 81 mg EC tablet Hold day of surgery  • Calcium Carbonate-Vitamin D (CALCIUM 600+D PO) Hold day of surgery  • cholecalciferol (VITAMIN D3) 1,000 units tablet Hold day of surgery  • GARLIC PO Hold day of surgery  • gatifloxacin (ZYMAXID) 0 5 % Instructions provided by MD   • hydrochlorothiazide (HYDRODIURIL) 12 5 mg tablet Hold day of surgery  • ketorolac (ACULAR) 0 5 % ophthalmic solution Instructions provided by MD   • levothyroxine 25 mcg tablet Take day of surgery  • Omega-3 Fatty Acids (fish oil) 1,000 mg Hold day of surgery     • prednisoLONE acetate (PRED FORTE) 1 % ophthalmic suspension Instructions provided by MD   • triamcinolone (KENALOG) 0 1 % cream Hold day of surgery  Pre op instructions given  Pt to follow Dr Zachery Riddle instructions  Pt to arrive to the [facility] at the given time, wear a mask    daughter Nandini Renee [phone]

## 2023-03-13 ENCOUNTER — ANESTHESIA (OUTPATIENT)
Dept: PERIOP | Facility: AMBULARY SURGERY CENTER | Age: 78
End: 2023-03-13

## 2023-03-13 ENCOUNTER — HOSPITAL ENCOUNTER (OUTPATIENT)
Facility: AMBULARY SURGERY CENTER | Age: 78
Setting detail: OUTPATIENT SURGERY
Discharge: HOME | End: 2023-03-13
Attending: OPHTHALMOLOGY | Admitting: OPHTHALMOLOGY

## 2023-03-13 ENCOUNTER — ANESTHESIA EVENT (OUTPATIENT)
Dept: PERIOP | Facility: AMBULARY SURGERY CENTER | Age: 78
End: 2023-03-13

## 2023-03-13 VITALS
SYSTOLIC BLOOD PRESSURE: 148 MMHG | RESPIRATION RATE: 18 BRPM | HEART RATE: 47 BPM | TEMPERATURE: 97.5 F | OXYGEN SATURATION: 100 % | DIASTOLIC BLOOD PRESSURE: 68 MMHG

## 2023-03-13 DIAGNOSIS — H25.811 COMBINED FORMS OF AGE-RELATED CATARACT OF RIGHT EYE: ICD-10-CM

## 2023-03-13 DEVICE — ACRYSOF(R) IQ ASPHERIC NATURAL IOL, SINGLE-PIECE ACRYLIC FOLDABLE PCL, UV WITH BLUE LIGHTFILTER, 13.0MM LENGTH, 6.0MM ANTERIORASYMMETRIC BICONVEX OPTIC, PLANAR HAPTICS.
Type: IMPLANTABLE DEVICE | Site: EYE | Status: FUNCTIONAL
Brand: ACRYSOF®

## 2023-03-13 RX ORDER — ONDANSETRON 2 MG/ML
4 INJECTION INTRAMUSCULAR; INTRAVENOUS ONCE AS NEEDED
Status: DISCONTINUED | OUTPATIENT
Start: 2023-03-13 | End: 2023-03-13 | Stop reason: HOSPADM

## 2023-03-13 RX ORDER — TETRACAINE HYDROCHLORIDE 5 MG/ML
SOLUTION OPHTHALMIC AS NEEDED
Status: DISCONTINUED | OUTPATIENT
Start: 2023-03-13 | End: 2023-03-13 | Stop reason: HOSPADM

## 2023-03-13 RX ORDER — GATIFLOXACIN 5 MG/ML
1 SOLUTION/ DROPS OPHTHALMIC 2 TIMES DAILY
Start: 2023-03-13

## 2023-03-13 RX ORDER — MIDAZOLAM HYDROCHLORIDE 2 MG/2ML
INJECTION, SOLUTION INTRAMUSCULAR; INTRAVENOUS AS NEEDED
Status: DISCONTINUED | OUTPATIENT
Start: 2023-03-13 | End: 2023-03-13

## 2023-03-13 RX ORDER — BALANCED SALT SOLUTION 6.4; .75; .48; .3; 3.9; 1.7 MG/ML; MG/ML; MG/ML; MG/ML; MG/ML; MG/ML
SOLUTION OPHTHALMIC AS NEEDED
Status: DISCONTINUED | OUTPATIENT
Start: 2023-03-13 | End: 2023-03-13 | Stop reason: HOSPADM

## 2023-03-13 RX ORDER — TETRACAINE HYDROCHLORIDE 5 MG/ML
1 SOLUTION OPHTHALMIC ONCE
Status: COMPLETED | OUTPATIENT
Start: 2023-03-13 | End: 2023-03-13

## 2023-03-13 RX ORDER — PHENYLEPHRINE HCL 2.5 %
1 DROPS OPHTHALMIC (EYE)
Status: COMPLETED | OUTPATIENT
Start: 2023-03-13 | End: 2023-03-13

## 2023-03-13 RX ORDER — GATIFLOXACIN 5 MG/ML
SOLUTION/ DROPS OPHTHALMIC AS NEEDED
Status: DISCONTINUED | OUTPATIENT
Start: 2023-03-13 | End: 2023-03-13 | Stop reason: HOSPADM

## 2023-03-13 RX ORDER — KETOROLAC TROMETHAMINE 5 MG/ML
1 SOLUTION OPHTHALMIC 4 TIMES DAILY
Start: 2023-03-13

## 2023-03-13 RX ORDER — LIDOCAINE HYDROCHLORIDE 20 MG/ML
1 JELLY TOPICAL
Status: COMPLETED | OUTPATIENT
Start: 2023-03-13 | End: 2023-03-13

## 2023-03-13 RX ORDER — CYCLOPENTOLATE HYDROCHLORIDE 10 MG/ML
1 SOLUTION/ DROPS OPHTHALMIC
Status: COMPLETED | OUTPATIENT
Start: 2023-03-13 | End: 2023-03-13

## 2023-03-13 RX ORDER — LIDOCAINE HYDROCHLORIDE 10 MG/ML
INJECTION, SOLUTION EPIDURAL; INFILTRATION; INTRACAUDAL; PERINEURAL AS NEEDED
Status: DISCONTINUED | OUTPATIENT
Start: 2023-03-13 | End: 2023-03-13 | Stop reason: HOSPADM

## 2023-03-13 RX ORDER — KETOROLAC TROMETHAMINE 5 MG/ML
1 SOLUTION OPHTHALMIC
Status: COMPLETED | OUTPATIENT
Start: 2023-03-13 | End: 2023-03-13

## 2023-03-13 RX ADMIN — PHENYLEPHRINE HYDROCHLORIDE 1 DROP: 25 SOLUTION/ DROPS OPHTHALMIC at 10:10

## 2023-03-13 RX ADMIN — CYCLOPENTOLATE HYDROCHLORIDE 1 DROP: 10 SOLUTION/ DROPS OPHTHALMIC at 09:40

## 2023-03-13 RX ADMIN — TETRACAINE HYDROCHLORIDE 1 DROP: 5 SOLUTION OPHTHALMIC at 09:25

## 2023-03-13 RX ADMIN — LIDOCAINE HYDROCHLORIDE 1 APPLICATION.: 20 JELLY TOPICAL at 09:55

## 2023-03-13 RX ADMIN — LIDOCAINE HYDROCHLORIDE 1 APPLICATION.: 20 JELLY TOPICAL at 09:40

## 2023-03-13 RX ADMIN — KETOROLAC TROMETHAMINE 1 DROP: 5 SOLUTION OPHTHALMIC at 09:25

## 2023-03-13 RX ADMIN — PHENYLEPHRINE HYDROCHLORIDE 1 DROP: 25 SOLUTION/ DROPS OPHTHALMIC at 09:40

## 2023-03-13 RX ADMIN — CYCLOPENTOLATE HYDROCHLORIDE 1 DROP: 10 SOLUTION/ DROPS OPHTHALMIC at 09:55

## 2023-03-13 RX ADMIN — LIDOCAINE HYDROCHLORIDE 1 APPLICATION.: 20 JELLY TOPICAL at 09:25

## 2023-03-13 RX ADMIN — KETOROLAC TROMETHAMINE 1 DROP: 5 SOLUTION OPHTHALMIC at 09:55

## 2023-03-13 RX ADMIN — PHENYLEPHRINE HYDROCHLORIDE 1 DROP: 25 SOLUTION/ DROPS OPHTHALMIC at 09:25

## 2023-03-13 RX ADMIN — CYCLOPENTOLATE HYDROCHLORIDE 1 DROP: 10 SOLUTION/ DROPS OPHTHALMIC at 09:25

## 2023-03-13 RX ADMIN — KETOROLAC TROMETHAMINE 1 DROP: 5 SOLUTION OPHTHALMIC at 10:10

## 2023-03-13 RX ADMIN — MIDAZOLAM 2 MG: 1 INJECTION INTRAMUSCULAR; INTRAVENOUS at 10:21

## 2023-03-13 RX ADMIN — KETOROLAC TROMETHAMINE 1 DROP: 5 SOLUTION OPHTHALMIC at 09:40

## 2023-03-13 RX ADMIN — CYCLOPENTOLATE HYDROCHLORIDE 1 DROP: 10 SOLUTION/ DROPS OPHTHALMIC at 10:10

## 2023-03-13 RX ADMIN — PHENYLEPHRINE HYDROCHLORIDE 1 DROP: 25 SOLUTION/ DROPS OPHTHALMIC at 09:55

## 2023-03-13 NOTE — ANESTHESIA POSTPROCEDURE EVALUATION
Post-Op Assessment Note    CV Status:  Stable  Pain Score: 0    Pain management: adequate     Mental Status:  Awake   Hydration Status:  Stable   PONV Controlled:  None   Airway Patency:  Patent       Staff: CRNA         No notable events documented      BP   119/68   Temp      Pulse 73   Resp   14   SpO2   99

## 2023-03-13 NOTE — ANESTHESIA PREPROCEDURE EVALUATION
Procedure:  EXTRACTION EXTRACAPSULAR CATARACT PHACO INTRAOCULAR LENS (IOL) (Left: Eye)    Relevant Problems   ANESTHESIA (within normal limits)      CARDIO   (+) Benign essential hypertension   (+) Murmur      ENDO   (+) Acquired hypothyroidism      MUSCULOSKELETAL   (+) Primary osteoarthritis of right knee   (+) Primary osteoarthritis of right shoulder      PULMONARY (within normal limits)        Physical Exam    Airway    Mallampati score: II  TM Distance: >3 FB  Neck ROM: full     Dental   No notable dental hx     Cardiovascular  Cardiovascular exam normal    Pulmonary  Pulmonary exam normal     Other Findings        Anesthesia Plan  ASA Score- 2     Anesthesia Type- IV sedation with anesthesia with ASA Monitors  Additional Monitors:   Airway Plan:           Plan Factors-Exercise tolerance (METS): >4 METS  Chart reviewed  Existing labs reviewed  Patient summary reviewed  Patient is not a current smoker  Induction-     Postoperative Plan-     Informed Consent- Anesthetic plan and risks discussed with patient  I personally reviewed this patient with the CRNA  Discussed and agreed on the Anesthesia Plan with the CHRIS Smallwood

## 2023-03-13 NOTE — OP NOTE
OPERATIVE REPORT    PATIENT NAME: Ozzy Harris    :  1945  MRN: [id number]  Pt Location: [facility] OR ROOM 01    Surgery Date: 3/13/2023    Surgeon(s) and Role:     * Gabriel Bautista MD - Primary    Age-related nuclear cataract, left eye [H25 12]    Post-Op Diagnosis Codes:     * Age-related nuclear cataract, left eye [H25 12]    Procedure(s):  EXTRACTION EXTRACAPSULAR CATARACT PHACO INTRAOCULAR LENS (IOL)    Anesthesia Type:   IV Sedation with Anesthesia    Operative Indications:  Age-related nuclear cataract, left eye [H25 12]  Decreased vision to 20/40  With problems reading and watching television  Pt requested cataract sx the left eye    Procedure and Technique:    Procedure Details     The patient was brought in the OR in stable condition and placed on the operative table  The left eye was prepped and draped in the usual sterile manner  Attention was directed to the left eye where a lid speculum was placed  A 2 4 mm clear corneal incision was made temperally  1/2 cc of 1% MPF Lidocaine was irrigated into the anterior chamber followed by viscoat  The side port incision was placed superiorly  The capsularrhexis was made and the nucleus was hydrodissected with BSS  The nucleus was then removed with the phaco handpiece followed by removal of the cortical material with the I/A handpiece  The capsular bag was then filled with Provisc  The IOL was folded and placed in to the capsular bag and centered well  The remaining Provisc was removed from the eye with the I/A  The wounds were hydrated with BSS and found to be water tight  The lid speculum was removed and 2 drops of Gatifloxicin were placed over the cornea  A protective eye shield was taped over the eye and the patient went to PACU in stable condition  I will see the patient in the office tomorrow and the expected post op period is a few weeks         Complications: None        Disposition: PACU   Condition: Stable    SIGNATURE: Gabriel Bautista MD  DATE: March 13, 2023  TIME: 10:41 AM

## 2023-03-13 NOTE — DISCHARGE INSTR - AVS FIRST PAGE
Dr Kodi Zee Cataract Instructions    Activity:     1  No Driving until instructed   2  Keep shield on until seen tomorrow except when administering drops   3  No heavy lifting   4  No water in eye     Diet:     1  Resume normal diet    Normal Symptoms:     1  Mild Headache   2  Scratchy or picky feeling around eye    Call the office if:     1  You have any questions or concerns   2  If eye pain is not relieved by extra strength tylenol    Office phone number:  [phone]      Next appointment:     1  See Dr Kodi Zee at his office tomorrow as scheduled   __________________________________________________________   2  Bring blue eye kit with you and eyedrops to the office    A new set of comprehensive instructions will be given and reviewed with you during your office visit tomorrow

## 2023-07-12 ENCOUNTER — TELEPHONE (OUTPATIENT)
Dept: FAMILY MEDICINE CLINIC | Facility: CLINIC | Age: 78
End: 2023-07-12

## 2024-02-15 ENCOUNTER — OFFICE VISIT (OUTPATIENT)
Dept: FAMILY MEDICINE CLINIC | Facility: CLINIC | Age: 79
End: 2024-02-15
Payer: COMMERCIAL

## 2024-02-15 VITALS
WEIGHT: 156 LBS | HEART RATE: 57 BPM | DIASTOLIC BLOOD PRESSURE: 72 MMHG | BODY MASS INDEX: 25.99 KG/M2 | RESPIRATION RATE: 18 BRPM | TEMPERATURE: 98 F | SYSTOLIC BLOOD PRESSURE: 146 MMHG | HEIGHT: 65 IN

## 2024-02-15 DIAGNOSIS — Z12.31 SCREENING MAMMOGRAM FOR HIGH-RISK PATIENT: ICD-10-CM

## 2024-02-15 DIAGNOSIS — M17.11 PRIMARY OSTEOARTHRITIS OF RIGHT KNEE: Primary | ICD-10-CM

## 2024-02-15 DIAGNOSIS — E03.9 ACQUIRED HYPOTHYROIDISM: ICD-10-CM

## 2024-02-15 DIAGNOSIS — Z00.00 MEDICARE ANNUAL WELLNESS VISIT, SUBSEQUENT: ICD-10-CM

## 2024-02-15 DIAGNOSIS — Z23 NEED FOR VACCINATION: ICD-10-CM

## 2024-02-15 DIAGNOSIS — D70.9 CHRONIC IDIOPATHIC NEUTROPENIA (HCC): ICD-10-CM

## 2024-02-15 DIAGNOSIS — Z13.6 SCREENING FOR CARDIOVASCULAR CONDITION: ICD-10-CM

## 2024-02-15 DIAGNOSIS — I27.20 PULMONARY HYPERTENSION (HCC): ICD-10-CM

## 2024-02-15 DIAGNOSIS — Z78.0 POST-MENOPAUSAL: ICD-10-CM

## 2024-02-15 DIAGNOSIS — M25.562 CHRONIC PAIN OF LEFT KNEE: ICD-10-CM

## 2024-02-15 DIAGNOSIS — I10 BENIGN ESSENTIAL HYPERTENSION: ICD-10-CM

## 2024-02-15 DIAGNOSIS — G89.29 CHRONIC PAIN OF LEFT KNEE: ICD-10-CM

## 2024-02-15 DIAGNOSIS — Z12.11 SCREEN FOR COLON CANCER: ICD-10-CM

## 2024-02-15 PROCEDURE — 90480 ADMN SARSCOV2 VAC 1/ONLY CMP: CPT

## 2024-02-15 PROCEDURE — 91320 SARSCV2 VAC 30MCG TRS-SUC IM: CPT

## 2024-02-15 PROCEDURE — 99214 OFFICE O/P EST MOD 30 MIN: CPT | Performed by: FAMILY MEDICINE

## 2024-02-15 PROCEDURE — G0439 PPPS, SUBSEQ VISIT: HCPCS | Performed by: FAMILY MEDICINE

## 2024-02-15 RX ORDER — HYDROCHLOROTHIAZIDE 12.5 MG/1
12.5 TABLET ORAL DAILY
Qty: 90 TABLET | Refills: 1 | Status: SHIPPED | OUTPATIENT
Start: 2024-02-15 | End: 2024-08-13

## 2024-02-15 RX ORDER — MELOXICAM 15 MG/1
15 TABLET ORAL DAILY
Qty: 90 TABLET | Refills: 0 | Status: SHIPPED | OUTPATIENT
Start: 2024-02-15

## 2024-02-15 RX ORDER — LEVOTHYROXINE SODIUM 0.03 MG/1
25 TABLET ORAL DAILY
Qty: 90 TABLET | Refills: 1 | Status: SHIPPED | OUTPATIENT
Start: 2024-02-15

## 2024-02-15 NOTE — PROGRESS NOTES
Assessment and Plan:     Problem List Items Addressed This Visit        Endocrine    Acquired hypothyroidism     Pt has been off meds for months         Relevant Medications    levothyroxine 25 mcg tablet    Other Relevant Orders    CBC    TSH, 3rd generation       Cardiovascular and Mediastinum    Pulmonary hypertension (HCC)    Benign essential hypertension     Been off bp meds for months  Will refill bp meds         Relevant Medications    hydroCHLOROthiazide 12.5 mg tablet    Other Relevant Orders    CBC       Musculoskeletal and Integument    Primary osteoarthritis of right knee - Primary    Relevant Medications    meloxicam (Mobic) 15 mg tablet    Other Relevant Orders    XR knee 3 vw left non injury    XR knee 3 vw right non injury    CBC       Other    Chronic idiopathic neutropenia (HCC)    Relevant Medications    meloxicam (Mobic) 15 mg tablet   Other Visit Diagnoses     Medicare annual wellness visit, subsequent        Relevant Orders    CBC    Chronic pain of left knee        Relevant Medications    meloxicam (Mobic) 15 mg tablet    Other Relevant Orders    XR knee 3 vw left non injury    XR knee 3 vw right non injury    CBC    Screening mammogram for high-risk patient        Relevant Orders    Mammo screening bilateral w 3d & cad    CBC    Post-menopausal        Relevant Orders    DXA bone density spine hip and pelvis    CBC    Screening for cardiovascular condition        Relevant Orders    Comprehensive metabolic panel    CBC    Lipid Panel with Direct LDL reflex    Need for vaccination        Relevant Orders    COVID-19 Pfizer mRNA vaccine 12 yr and older (GRAY cap vial or pre-filled syringe) (Completed)    Screen for colon cancer        Relevant Orders    Ambulatory referral to Gastroenterology           Preventive health issues were discussed with patient, and age appropriate screening tests were ordered as noted in patient's After Visit Summary.  Personalized health advice and appropriate  referrals for health education or preventive services given if needed, as noted in patient's After Visit Summary.     History of Present Illness:     Patient presents for a Medicare Wellness Visit    Pt states she is having issues with her knees.  Pt states the the knees hurt B/L  - this has been a long time  When its bad the pain is 9/10  Really bad with stairs malcom when she is coming down      Arthritis  Pertinent negatives include no diarrhea or fatigue.      Patient Care Team:  Frank Lombardi, DO as PCP - General (Family Medicine)  Pete Manzanares MD as Consulting Physician (Ophthalmology)  Luan Mccormack MD as Consulting Physician (Gastroenterology)     Review of Systems:     Review of Systems   Constitutional: Negative.  Negative for activity change, appetite change, chills, diaphoresis and fatigue.   HENT: Negative.  Negative for dental problem, ear pain, sinus pressure and sore throat.    Eyes: Negative.  Negative for photophobia, pain, discharge, redness, itching and visual disturbance.   Respiratory:  Negative for apnea and chest tightness.    Cardiovascular: Negative.  Negative for chest pain, palpitations and leg swelling.   Gastrointestinal: Negative.  Negative for abdominal distention, abdominal pain, constipation and diarrhea.   Endocrine: Negative.  Negative for cold intolerance and heat intolerance.   Genitourinary: Negative.  Negative for difficulty urinating and dyspareunia.   Musculoskeletal:  Positive for arthralgias and arthritis. Negative for back pain.   Skin: Negative.    Allergic/Immunologic: Negative for environmental allergies.   Neurological: Negative.  Negative for dizziness.   Psychiatric/Behavioral: Negative.  Negative for agitation.         Problem List:     Patient Active Problem List   Diagnosis   • Abnormal mammogram of right breast   • Dense breast tissue   • Murmur   • Chronic idiopathic neutropenia (HCC)   • Acquired hypothyroidism   • Primary osteoarthritis of right knee   •  Primary osteoarthritis of right shoulder   • Pulmonary hypertension (HCC)   • Arthralgia   • External hemorrhoids   • Benign essential hypertension      Past Medical and Surgical History:     Past Medical History:   Diagnosis Date   • Arthritis     right knee, leg   • Colon polyp    • Disease of thyroid gland     hypo   • Leukocytosis     last assessed: 10/21/2016   • Wears glasses      Past Surgical History:   Procedure Laterality Date   •  SECTION      5 c sec   • COLONOSCOPY N/A 2016    Procedure: COLONOSCOPY;  Surgeon: Luan Mccormack MD;  Location: [facility];  Service:    • [place] XCAPSL CTRC RMVL INSJ IO LENS PROSTH W/O ECP Right 2023    Procedure: EXTRACTION EXTRACAPSULAR CATARACT PHACO INTRAOCULAR LENS (IOL);  Surgeon: Pete Manzanares MD;  Location: [facility];  Service: Ophthalmology   • [place] XCAPSL CTRC RMVL INSJ IO LENS PROSTH W/O ECP Left 3/13/2023    Procedure: EXTRACTION EXTRACAPSULAR CATARACT PHACO INTRAOCULAR LENS (IOL);  Surgeon: Pete Manzanares MD;  Location: [facility];  Service: Ophthalmology      Family History:     Family History   Problem Relation Age of Onset   • Arthritis Mother         Knee   • Hypertension Mother    • Heart disease Mother         Pacemaker Placement   • Hypertension Father    • Heart disease Brother    • No Known Problems Sister    • No Known Problems Daughter    • No Known Problems Sister    • No Known Problems Daughter    • No Known Problems Daughter    • No Known Problems Daughter    • Mental illness Neg Hx       Social History:     Social History     Socioeconomic History   • Marital status: /Civil Union     Spouse name: None   • Number of children: None   • Years of education: None   • Highest education level: None   Occupational History   • None   Tobacco Use   • Smoking status: Never     Passive exposure: Never   • Smokeless tobacco: Never   Vaping Use   • Vaping status: Never Used   Substance and Sexual Activity   • Alcohol use: Yes      Comment: rare   • Drug use: Never   • Sexual activity: Not Currently     Birth control/protection: Post-menopausal   Other Topics Concern   • None   Social History Narrative   • None     Social Determinants of Health     Financial Resource Strain: Low Risk  (2/15/2024)    Overall Financial Resource Strain (CARDIA)    • Difficulty of Paying Living Expenses: Not hard at all   Food Insecurity: Not on file   Transportation Needs: No Transportation Needs (2/15/2024)    PRAPARE - Transportation    • Lack of Transportation (Medical): No    • Lack of Transportation (Non-Medical): No   Physical Activity: Not on file   Stress: Not on file   Social Connections: Not on file   Intimate Partner Violence: Not on file   Housing Stability: Not on file      Medications and Allergies:     Current Outpatient Medications   Medication Sig Dispense Refill   • aspirin (ECOTRIN LOW STRENGTH) 81 mg EC tablet Take 81 mg by mouth daily     • Calcium Carbonate-Vitamin D (CALCIUM 600+D PO) Take by mouth daily with lunch     • cholecalciferol (VITAMIN D3) 1,000 units tablet Take 1,000 Units by mouth daily     • hydroCHLOROthiazide 12.5 mg tablet Take 1 tablet (12.5 mg total) by mouth daily 90 tablet 1   • levothyroxine 25 mcg tablet Take 1 tablet (25 mcg total) by mouth daily 90 tablet 1   • meloxicam (Mobic) 15 mg tablet Take 1 tablet (15 mg total) by mouth daily 90 tablet 0   • Omega-3 Fatty Acids (fish oil) 1,000 mg Take 1,000 mg by mouth daily Last dose 5/1/21     • triamcinolone (KENALOG) 0.1 % cream Apply 1 application topically 2 (two) times a day To affected area       No current facility-administered medications for this visit.     No Known Allergies   Immunizations:     Immunization History   Administered Date(s) Administered   • COVID-19 MODERNA VACC 0.5 ML IM 02/13/2021, 03/13/2021   • COVID-19 Pfizer mRNA vacc PF christ-sucrose 12 yr and older (Comirnaty) 02/15/2024   • Influenza Split High Dose Preservative Free IM 10/09/2015   •  Influenza, high dose seasonal 0.7 mL 11/05/2019, 10/10/2020, 11/04/2021   • Pneumococcal Conjugate 13-Valent 09/23/2015   • Pneumococcal Polysaccharide PPV23 04/02/2019   • TD (adult) Preservative Free 04/02/2019      Health Maintenance:         Topic Date Due   • DXA SCAN  02/04/2022   • Breast Cancer Screening: Mammogram  11/30/2022   • Colorectal Cancer Screening  05/07/2024   • Hepatitis C Screening  Completed     There are no preventive care reminders to display for this patient.     Medicare Screening Tests and Risk Assessments:     Page is here for her Subsequent Wellness visit. Last Medicare Wellness visit information reviewed, patient interviewed, no change since last AWV.     Health Risk Assessment:   Patient rates overall health as good. Patient feels that their physical health rating is same. Patient is satisfied with their life. Eyesight was rated as same. Hearing was rated as same. Patient feels that their emotional and mental health rating is same. Patients states they are never, rarely angry. Patient states they are never, rarely unusually tired/fatigued. Pain experienced in the last 7 days has been none. Patient states that she has experienced no weight loss or gain in last 6 months.     Depression Screening:   PHQ-2 Score: 0      Fall Risk Screening:   In the past year, patient has experienced: no history of falling in past year      Urinary Incontinence Screening:   Patient has not leaked urine accidently in the last six months.     Home Safety:  Patient has trouble with stairs inside or outside of their home. Patient has working smoke alarms and has working carbon monoxide detector. Home safety hazards include: none.     Nutrition:   Current diet is Regular and Limited junk food.     Medications:   Patient is currently taking over-the-counter supplements. OTC medications include: see medication list. Patient is able to manage medications.     Activities of Daily Living (ADLs)/Instrumental  Activities of Daily Living (IADLs):   Walk and transfer into and out of bed and chair?: Yes  Dress and groom yourself?: Yes    Bathe or shower yourself?: Yes    Feed yourself? Yes  Do your laundry/housekeeping?: Yes  Manage your money, pay your bills and track your expenses?: Yes  Make your own meals?: Yes    Do your own shopping?: Yes    Previous Hospitalizations:   Any hospitalizations or ED visits within the last 12 months?: No      Advance Care Planning:   Living will: No    Durable POA for healthcare: No    Advanced directive: No      Cognitive Screening:   Provider or family/friend/caregiver concerned regarding cognition?: No    PREVENTIVE SCREENINGS      Cardiovascular Screening:    General: Screening Current and Risks and Benefits Discussed    Due for: Lipid Panel      Diabetes Screening:     General: Risks and Benefits Discussed    Due for: Blood Glucose      Colorectal Cancer Screening:     General: Screening Current    Due for: Colonoscopy - High Risk      Breast Cancer Screening:     General: Risks and Benefits Discussed    Due for: Mammogram        Cervical Cancer Screening:    General: Screening Not Indicated and Risks and Benefits Discussed      Osteoporosis Screening:    General: Risks and Benefits Discussed    Due for: Bone Density Ultrasound      Abdominal Aortic Aneurysm (AAA) Screening:        General: Screening Not Indicated      Lung Cancer Screening:     General: Screening Not Indicated      Hepatitis C Screening:    General: Screening Current    Screening, Brief Intervention, and Referral to Treatment (SBIRT)    Screening  Typical number of drinks in a day: 0  Typical number of drinks in a week: 0  Interpretation: Low risk drinking behavior.    AUDIT-C Screenin) How often did you have a drink containing alcohol in the past year? never  2) How many drinks did you have on a typical day when you were drinking in the past year? 0  3) How often did you have 6 or more drinks on one occasion in  "the past year? never    AUDIT-C Score: 0  Interpretation: Score 0-2 (female): Negative screen for alcohol misuse    Single Item Drug Screening:  How often have you used an illegal drug (including marijuana) or a prescription medication for non-medical reasons in the past year? never    Single Item Drug Screen Score: 0  Interpretation: Negative screen for possible drug use disorder    Brief Intervention  Alcohol & drug use screenings were reviewed. No concerns regarding substance use disorder identified.     No results found.     Physical Exam:     /72   Pulse 57   Temp 98 °F (36.7 °C) (Tympanic)   Resp 18   Ht 5' 5\" (1.651 m)   Wt 70.8 kg (156 lb)   BMI 25.96 kg/m²     Physical Exam  Vitals and nursing note reviewed.   Constitutional:       Appearance: She is well-developed.   HENT:      Head: Normocephalic and atraumatic.      Right Ear: External ear normal.      Left Ear: External ear normal.      Nose: Nose normal.   Eyes:      General: No scleral icterus.        Right eye: No discharge.         Left eye: No discharge.      Conjunctiva/sclera: Conjunctivae normal.      Pupils: Pupils are equal, round, and reactive to light.   Neck:      Thyroid: No thyromegaly.   Cardiovascular:      Rate and Rhythm: Normal rate and regular rhythm.      Heart sounds: Normal heart sounds. No murmur heard.     No friction rub. No gallop.   Pulmonary:      Effort: Pulmonary effort is normal. No respiratory distress.      Breath sounds: Normal breath sounds. No wheezing or rales.   Chest:      Chest wall: No tenderness.   Abdominal:      General: Bowel sounds are normal. There is no distension.      Palpations: Abdomen is soft.      Tenderness: There is no abdominal tenderness.   Musculoskeletal:         General: No deformity. Normal range of motion.      Cervical back: Normal range of motion and neck supple.      Comments: Creps in knees B/L  Mild swelling   Lymphadenopathy:      Cervical: No cervical adenopathy. "   Skin:     General: Skin is warm and dry.      Capillary Refill: Capillary refill takes less than 2 seconds.      Comments: Varicosities b/l lower ext malcom lat aspect of left ankle   Neurological:      Mental Status: She is alert and oriented to person, place, and time.   Psychiatric:         Behavior: Behavior normal.         Thought Content: Thought content normal.         Judgment: Judgment normal.          Frank Lombardi, DO

## 2024-02-15 NOTE — PATIENT INSTRUCTIONS
Medicare Preventive Visit Patient Instructions  Thank you for completing your Welcome to Medicare Visit or Medicare Annual Wellness Visit today. Your next wellness visit will be due in one year (2/15/2025).  The screening/preventive services that you may require over the next 5-10 years are detailed below. Some tests may not apply to you based off risk factors and/or age. Screening tests ordered at today's visit but not completed yet may show as past due. Also, please note that scanned in results may not display below.  Preventive Screenings:  Service Recommendations Previous Testing/Comments   Colorectal Cancer Screening  * Colonoscopy    * Fecal Occult Blood Test (FOBT)/Fecal Immunochemical Test (FIT)  * Fecal DNA/Cologuard Test  * Flexible Sigmoidoscopy Age: 45-75 years old   Colonoscopy: every 10 years (may be performed more frequently if at higher risk)  OR  FOBT/FIT: every 1 year  OR  Cologuard: every 3 years  OR  Sigmoidoscopy: every 5 years  Screening may be recommended earlier than age 45 if at higher risk for colorectal cancer. Also, an individualized decision between you and your healthcare provider will decide whether screening between the ages of 76-85 would be appropriate. Colonoscopy: 05/07/2021  FOBT/FIT: Not on file  Cologuard: Not on file  Sigmoidoscopy: Not on file    Screening Current     Breast Cancer Screening Age: 40+ years old  Frequency: every 1-2 years  Not required if history of left and right mastectomy Mammogram: 11/30/2021        Cervical Cancer Screening Between the ages of 21-29, pap smear recommended once every 3 years.   Between the ages of 30-65, can perform pap smear with HPV co-testing every 5 years.   Recommendations may differ for women with a history of total hysterectomy, cervical cancer, or abnormal pap smears in past. Pap Smear: 11/16/2020    Screening Not Indicated   Hepatitis C Screening Once for adults born between 1945 and 1965  More frequently in patients at high risk  for Hepatitis C Hep C Antibody: 04/03/2019    Screening Current   Diabetes Screening 1-2 times per year if you're at risk for diabetes or have pre-diabetes Fasting glucose: 96 mg/dL (6/11/2022)  A1C: No results in last 5 years (No results in last 5 years)      Cholesterol Screening Once every 5 years if you don't have a lipid disorder. May order more often based on risk factors. Lipid panel: 11/05/2021    Screening Current     Other Preventive Screenings Covered by Medicare:  Abdominal Aortic Aneurysm (AAA) Screening: covered once if your at risk. You're considered to be at risk if you have a family history of AAA.  Lung Cancer Screening: covers low dose CT scan once per year if you meet all of the following conditions: (1) Age 55-77; (2) No signs or symptoms of lung cancer; (3) Current smoker or have quit smoking within the last 15 years; (4) You have a tobacco smoking history of at least 20 pack years (packs per day multiplied by number of years you smoked); (5) You get a written order from a healthcare provider.  Glaucoma Screening: covered annually if you're considered high risk: (1) You have diabetes OR (2) Family history of glaucoma OR (3)  aged 50 and older OR (4)  American aged 65 and older  Osteoporosis Screening: covered every 2 years if you meet one of the following conditions: (1) You're estrogen deficient and at risk for osteoporosis based off medical history and other findings; (2) Have a vertebral abnormality; (3) On glucocorticoid therapy for more than 3 months; (4) Have primary hyperparathyroidism; (5) On osteoporosis medications and need to assess response to drug therapy.   Last bone density test (DXA Scan): 02/04/2020.  HIV Screening: covered annually if you're between the age of 15-65. Also covered annually if you are younger than 15 and older than 65 with risk factors for HIV infection. For pregnant patients, it is covered up to 3 times per  pregnancy.    Immunizations:  Immunization Recommendations   Influenza Vaccine Annual influenza vaccination during flu season is recommended for all persons aged >= 6 months who do not have contraindications   Pneumococcal Vaccine   * Pneumococcal conjugate vaccine = PCV13 (Prevnar 13), PCV15 (Vaxneuvance), PCV20 (Prevnar 20)  * Pneumococcal polysaccharide vaccine = PPSV23 (Pneumovax) Adults 19-65 yo with certain risk factors or if 65+ yo  If never received any pneumonia vaccine: recommend Prevnar 20 (PCV20)  Give PCV20 if previously received 1 dose of PCV13 or PPSV23   Hepatitis B Vaccine 3 dose series if at intermediate or high risk (ex: diabetes, end stage renal disease, liver disease)   Respiratory syncytial virus (RSV) Vaccine - COVERED BY MEDICARE PART D  * RSVPreF3 (Arexvy) CDC recommends that adults 60 years of age and older may receive a single dose of RSV vaccine using shared clinical decision-making (SCDM)   Tetanus (Td) Vaccine - COST NOT COVERED BY MEDICARE PART B Following completion of primary series, a booster dose should be given every 10 years to maintain immunity against tetanus. Td may also be given as tetanus wound prophylaxis.   Tdap Vaccine - COST NOT COVERED BY MEDICARE PART B Recommended at least once for all adults. For pregnant patients, recommended with each pregnancy.   Shingles Vaccine (Shingrix) - COST NOT COVERED BY MEDICARE PART B  2 shot series recommended in those 19 years and older who have or will have weakened immune systems or those 50 years and older     Health Maintenance Due:      Topic Date Due   • DXA SCAN  02/04/2022   • Breast Cancer Screening: Mammogram  11/30/2022   • Colorectal Cancer Screening  05/07/2024   • Hepatitis C Screening  Completed     Immunizations Due:      Topic Date Due   • COVID-19 Vaccine (3 - 2023-24 season) 09/01/2023     Advance Directives   What are advance directives?  Advance directives are legal documents that state your wishes and plans for  medical care. These plans are made ahead of time in case you lose your ability to make decisions for yourself. Advance directives can apply to any medical decision, such as the treatments you want, and if you want to donate organs.   What are the types of advance directives?  There are many types of advance directives, and each state has rules about how to use them. You may choose a combination of any of the following:  Living will:  This is a written record of the treatment you want. You can also choose which treatments you do not want, which to limit, and which to stop at a certain time. This includes surgery, medicine, IV fluid, and tube feedings.   Durable power of  for healthcare (DPAHC):  This is a written record that states who you want to make healthcare choices for you when you are unable to make them for yourself. This person, called a proxy, is usually a family member or a friend. You may choose more than 1 proxy.  Do not resuscitate (DNR) order:  A DNR order is used in case your heart stops beating or you stop breathing. It is a request not to have certain forms of treatment, such as CPR. A DNR order may be included in other types of advance directives.  Medical directive:  This covers the care that you want if you are in a coma, near death, or unable to make decisions for yourself. You can list the treatments you want for each condition. Treatment may include pain medicine, surgery, blood transfusions, dialysis, IV or tube feedings, and a ventilator (breathing machine).  Values history:  This document has questions about your views, beliefs, and how you feel and think about life. This information can help others choose the care that you would choose.  Why are advance directives important?  An advance directive helps you control your care. Although spoken wishes may be used, it is better to have your wishes written down. Spoken wishes can be misunderstood, or not followed. Treatments may be given  even if you do not want them. An advance directive may make it easier for your family to make difficult choices about your care.   Weight Management   Why it is important to manage your weight:  Being overweight increases your risk of health conditions such as heart disease, high blood pressure, type 2 diabetes, and certain types of cancer. It can also increase your risk for osteoarthritis, sleep apnea, and other respiratory problems. Aim for a slow, steady weight loss. Even a small amount of weight loss can lower your risk of health problems.  How to lose weight safely:  A safe and healthy way to lose weight is to eat fewer calories and get regular exercise. You can lose up about 1 pound a week by decreasing the number of calories you eat by 500 calories each day.   Healthy meal plan for weight management:  A healthy meal plan includes a variety of foods, contains fewer calories, and helps you stay healthy. A healthy meal plan includes the following:  Eat whole-grain foods more often.  A healthy meal plan should contain fiber. Fiber is the part of grains, fruits, and vegetables that is not broken down by your body. Whole-grain foods are healthy and provide extra fiber in your diet. Some examples of whole-grain foods are whole-wheat breads and pastas, oatmeal, brown rice, and bulgur.  Eat a variety of vegetables every day.  Include dark, leafy greens such as spinach, kale, mehran greens, and mustard greens. Eat yellow and orange vegetables such as carrots, sweet potatoes, and winter squash.   Eat a variety of fruits every day.  Choose fresh or canned fruit (canned in its own juice or light syrup) instead of juice. Fruit juice has very little or no fiber.  Eat low-fat dairy foods.  Drink fat-free (skim) milk or 1% milk. Eat fat-free yogurt and low-fat cottage cheese. Try low-fat cheeses such as mozzarella and other reduced-fat cheeses.  Choose meat and other protein foods that are low in fat.  Choose beans or other  legumes such as split peas or lentils. Choose fish, skinless poultry (chicken or turkey), or lean cuts of red meat (beef or pork). Before you cook meat or poultry, cut off any visible fat.   Use less fat and oil.  Try baking foods instead of frying them. Add less fat, such as margarine, sour cream, regular salad dressing and mayonnaise to foods. Eat fewer high-fat foods. Some examples of high-fat foods include french fries, doughnuts, ice cream, and cakes.  Eat fewer sweets.  Limit foods and drinks that are high in sugar. This includes candy, cookies, regular soda, and sweetened drinks.  Exercise:  Exercise at least 30 minutes per day on most days of the week. Some examples of exercise include walking, biking, dancing, and swimming. You can also fit in more physical activity by taking the stairs instead of the elevator or parking farther away from stores. Ask your healthcare provider about the best exercise plan for you.      © Copyright [employer] 2018 Information is for End User's use only and may not be sold, redistributed or otherwise used for commercial purposes. All illustrations and images included in CareNotes® are the copyrighted property of [employer] or [employer]

## 2024-02-22 ENCOUNTER — HOSPITAL ENCOUNTER (OUTPATIENT)
Dept: RADIOLOGY | Facility: HOSPITAL | Age: 79
Discharge: HOME | End: 2024-02-22
Attending: FAMILY MEDICINE
Payer: COMMERCIAL

## 2024-02-22 ENCOUNTER — HOSPITAL ENCOUNTER (OUTPATIENT)
Dept: RADIOLOGY | Facility: HOSPITAL | Age: 79
Discharge: HOME | End: 2024-02-22
Payer: COMMERCIAL

## 2024-02-22 VITALS — WEIGHT: 160 LBS | HEIGHT: 65 IN | BODY MASS INDEX: 26.66 KG/M2

## 2024-02-22 DIAGNOSIS — Z12.31 SCREENING MAMMOGRAM FOR HIGH-RISK PATIENT: ICD-10-CM

## 2024-02-22 DIAGNOSIS — M25.562 CHRONIC PAIN OF LEFT KNEE: ICD-10-CM

## 2024-02-22 DIAGNOSIS — G89.29 CHRONIC PAIN OF LEFT KNEE: ICD-10-CM

## 2024-02-22 DIAGNOSIS — M17.11 PRIMARY OSTEOARTHRITIS OF RIGHT KNEE: ICD-10-CM

## 2024-02-22 PROCEDURE — 73562 X-RAY EXAM OF KNEE 3: CPT

## 2024-02-22 PROCEDURE — 77063 BREAST TOMOSYNTHESIS BI: CPT

## 2024-02-22 PROCEDURE — 77067 SCR MAMMO BI INCL CAD: CPT

## 2024-02-23 ENCOUNTER — HOSPITAL ENCOUNTER (OUTPATIENT)
Dept: RADIOLOGY | Facility: HOSPITAL | Age: 79
Discharge: HOME | End: 2024-02-23
Attending: FAMILY MEDICINE
Payer: COMMERCIAL

## 2024-02-23 ENCOUNTER — TELEPHONE (OUTPATIENT)
Dept: FAMILY MEDICINE CLINIC | Facility: CLINIC | Age: 79
End: 2024-02-23

## 2024-02-23 ENCOUNTER — HOSPITAL ENCOUNTER (OUTPATIENT)
Dept: RADIOLOGY | Facility: HOSPITAL | Age: 79
End: 2024-02-23
Payer: COMMERCIAL

## 2024-02-23 VITALS — WEIGHT: 160 LBS | HEIGHT: 65 IN | BODY MASS INDEX: 26.66 KG/M2

## 2024-02-23 DIAGNOSIS — Z78.0 POST-MENOPAUSAL: ICD-10-CM

## 2024-02-23 PROBLEM — M85.851 OSTEOPENIA OF FEMORAL NECK, BILATERAL: Status: ACTIVE | Noted: 2024-02-23

## 2024-02-23 PROBLEM — M85.852 OSTEOPENIA OF FEMORAL NECK, BILATERAL: Status: ACTIVE | Noted: 2024-02-23

## 2024-02-23 PROCEDURE — 77080 DXA BONE DENSITY AXIAL: CPT

## 2024-02-23 NOTE — TELEPHONE ENCOUNTER
Please call pt, osteopaenia was seen on study.  I would suggest 1200 of calcium and 1000 of vit d.  We should repeat study in 2 years

## 2024-02-26 ENCOUNTER — TELEPHONE (OUTPATIENT)
Dept: FAMILY MEDICINE CLINIC | Facility: CLINIC | Age: 79
End: 2024-02-26

## 2024-02-26 ENCOUNTER — HOSPITAL ENCOUNTER (OUTPATIENT)
Dept: RADIOLOGY | Facility: HOSPITAL | Age: 79
Discharge: HOME | End: 2024-02-26

## 2024-02-26 DIAGNOSIS — M17.11 PRIMARY OSTEOARTHRITIS OF RIGHT KNEE: Primary | ICD-10-CM

## 2024-02-26 DIAGNOSIS — Z12.31 SCREENING MAMMOGRAM FOR HIGH-RISK PATIENT: ICD-10-CM

## 2024-02-26 NOTE — TELEPHONE ENCOUNTER
Patient called back and states is taking Calcium and Vitamin D at the recommended dose. Should she continue or do something else. Would like a call back at [phone]

## 2024-02-26 NOTE — TELEPHONE ENCOUNTER
Patient informed. Gave her the information for Dr. Ayon. She is going to call them. Please place referral for Dr. Ayon.  Victoria Carson CMA

## 2024-02-26 NOTE — TELEPHONE ENCOUNTER
Please call pt x ray shows significant signs of osteoarthritis in the rt knee.  Left knee is not bad.  Pt may want to be seen by ortho for eval of the knee, if she is in sig pain she may be a candidate for injections in the knee.  I usually suggest DR Villela for the knees

## 2024-02-27 ENCOUNTER — OFFICE VISIT (OUTPATIENT)
Dept: OBGYN CLINIC | Facility: CLINIC | Age: 79
End: 2024-02-27
Payer: COMMERCIAL

## 2024-02-27 VITALS
HEART RATE: 62 BPM | SYSTOLIC BLOOD PRESSURE: 156 MMHG | BODY MASS INDEX: 26.66 KG/M2 | HEIGHT: 65 IN | DIASTOLIC BLOOD PRESSURE: 73 MMHG | WEIGHT: 160 LBS

## 2024-02-27 DIAGNOSIS — M17.11 PRIMARY OSTEOARTHRITIS OF RIGHT KNEE: ICD-10-CM

## 2024-02-27 PROCEDURE — 99203 OFFICE O/P NEW LOW 30 MIN: CPT | Performed by: ORTHOPAEDIC SURGERY

## 2024-02-27 NOTE — PROGRESS NOTES
Assessment/Plan:  1. Primary osteoarthritis of right knee  Ambulatory referral to Orthopedic Surgery        The patient has significant arthritis of the right knee. Currently her symptoms are mild, as meloxicam has helped her significantly. We discussed continued anti-inflammatories as well as physical therapy exercises for her knee. I did provide her with a physician directed home exercise program today. We discussed corticosteroid injection and visco supplementation injections in the future if her symptoms worsen. She will FU as needed.     Subjective:   Page Mejia is a 78 y.o. female who presents today for evaluation of her right knee. She notes years of intermittent knee pain, which is worse with activity and better with rest. Recently this started to get worse, thought she denies any new injury or inciting event prior to the onset of her symptoms. Her pain is mostly about the lateral knee. She still notes good ROM of the knee. She is retired but enjoys walking. Her PCP prescribed her meloxicam which seems be helping her significantly, and she notes her symptoms are relatively mild at this point.       Review of Systems   Constitutional: Negative.  Negative for chills and fever.   HENT: Negative.  Negative for ear pain and sore throat.    Eyes: Negative.  Negative for pain and redness.   Respiratory: Negative.  Negative for shortness of breath and wheezing.    Cardiovascular:  Negative for chest pain and palpitations.   Gastrointestinal: Negative.  Negative for abdominal pain and blood in stool.   Endocrine: Negative.  Negative for polydipsia and polyuria.   Genitourinary: Negative.  Negative for difficulty urinating and dysuria.   Musculoskeletal:         As noted in HPI   Skin: Negative.  Negative for pallor and rash.   Neurological: Negative.  Negative for dizziness and numbness.   Hematological: Negative.  Negative for adenopathy. Does not bruise/bleed easily.   Psychiatric/Behavioral: Negative.  Negative  for confusion and suicidal ideas.          Past Medical History:   Diagnosis Date   • Arthritis     right knee, leg   • Colon polyp    • Disease of thyroid gland     hypo   • Leukocytosis     last assessed: 10/21/2016   • Wears glasses        Past Surgical History:   Procedure Laterality Date   •  SECTION      5 c sec   • COLONOSCOPY N/A 2016    Procedure: COLONOSCOPY;  Surgeon: Luan Mccormack MD;  Location: [facility] GI LAB;  Service:    • OR XCAPSL CTRC RMVL INSJ IO LENS PROSTH W/O ECP Right 2023    Procedure: EXTRACTION EXTRACAPSULAR CATARACT PHACO INTRAOCULAR LENS (IOL);  Surgeon: Pete Manzanares MD;  Location: [facility] MAIN OR;  Service: Ophthalmology   • OR XCAPSL CTRC RMVL INSJ IO LENS PROSTH W/O ECP Left 3/13/2023    Procedure: EXTRACTION EXTRACAPSULAR CATARACT PHACO INTRAOCULAR LENS (IOL);  Surgeon: Pete Manzanares MD;  Location: [facility] MAIN OR;  Service: Ophthalmology       Family History   Problem Relation Age of Onset   • Arthritis Mother         Knee   • Hypertension Mother    • Heart disease Mother         Pacemaker Placement   • Hypertension Father    • Heart disease Brother    • No Known Problems Sister    • No Known Problems Daughter    • No Known Problems Sister    • No Known Problems Daughter    • No Known Problems Daughter    • No Known Problems Daughter    • Mental illness Neg Hx        Social History     Occupational History   • Not on file   Tobacco Use   • Smoking status: Never     Passive exposure: Never   • Smokeless tobacco: Never   Vaping Use   • Vaping status: Never Used   Substance and Sexual Activity   • Alcohol use: Yes     Comment: rare   • Drug use: Never   • Sexual activity: Not Currently     Birth control/protection: Post-menopausal         Current Outpatient Medications:   •  aspirin (ECOTRIN LOW STRENGTH) 81 mg EC tablet, Take 81 mg by mouth daily, Disp: , Rfl:   •  Calcium Carbonate-Vitamin D (CALCIUM 600+D PO), Take by mouth daily with lunch, Disp: , Rfl:   •   cholecalciferol (VITAMIN D3) 1,000 units tablet, Take 1,000 Units by mouth daily, Disp: , Rfl:   •  hydroCHLOROthiazide 12.5 mg tablet, Take 1 tablet (12.5 mg total) by mouth daily, Disp: 90 tablet, Rfl: 1  •  levothyroxine 25 mcg tablet, Take 1 tablet (25 mcg total) by mouth daily, Disp: 90 tablet, Rfl: 1  •  meloxicam (Mobic) 15 mg tablet, Take 1 tablet (15 mg total) by mouth daily, Disp: 90 tablet, Rfl: 0  •  Omega-3 Fatty Acids (fish oil) 1,000 mg, Take 1,000 mg by mouth daily Last dose 5/1/21, Disp: , Rfl:   •  triamcinolone (KENALOG) 0.1 % cream, Apply 1 application topically 2 (two) times a day To affected area, Disp: , Rfl:     No Known Allergies    Objective:  Vitals:    02/27/24 1318   BP: 156/73   Pulse: 62     Pain Score: 0-No pain      Right Knee Exam     Tenderness   The patient is experiencing no tenderness.     Range of Motion   Extension:  0   Flexion:  120     Tests   Varus: negative Valgus: negative  Lachman:  Anterior - negative      Drawer:  Anterior - negative    Posterior - negative    Other   Erythema: absent  Sensation: normal  Pulse: present  Effusion: effusion (trace) present      Left Knee Exam     Tenderness   The patient is experiencing no tenderness.     Range of Motion   Extension:  0   Flexion:  120     Tests   Varus: negative Valgus: negative  Lachman:  Anterior - negative      Drawer:  Anterior - negative     Posterior - negative    Other   Erythema: absent  Sensation: normal  Pulse: present  Effusion: no effusion present          Observations   Left Knee   Negative for effusion.     Right Knee   Positive for effusion (trace).       Physical Exam  Constitutional:       General: She is not in acute distress.     Appearance: She is well-developed.   HENT:      Head: Normocephalic and atraumatic.   Eyes:      General: No scleral icterus.     Conjunctiva/sclera: Conjunctivae normal.   Neck:      Vascular: No JVD.   Cardiovascular:      Rate and Rhythm: Normal rate.   Pulmonary:       Effort: Pulmonary effort is normal. No respiratory distress.   Musculoskeletal:      Right knee: Effusion (trace) present.      Left knee: No effusion.      Comments: As per HPI   Skin:     General: Skin is warm.   Neurological:      Mental Status: She is alert and oriented to person, place, and time.      Coordination: Coordination normal.         I have personally reviewed pertinent films in PACS and my interpretation is as follows:  Xrays bilateral knees from 2/22/24: Significant tricompartmental arthritis of the right knee. Mild arthritis of the left knee.       This document was created using speech voice recognition software.   Grammatical errors, random word insertions, pronoun errors, and incomplete sentences are an occasional consequence of this system due to software limitations, ambient noise, and hardware issues.   Any formal questions or concerns about content, text, or information contained within the body of this dictation should be directly addressed to the provider for clarification.

## 2024-02-28 ENCOUNTER — TELEPHONE (OUTPATIENT)
Dept: FAMILY MEDICINE CLINIC | Facility: CLINIC | Age: 79
End: 2024-02-28

## 2024-03-29 ENCOUNTER — APPOINTMENT (OUTPATIENT)
Dept: LAB | Facility: HOSPITAL | Age: 79
End: 2024-03-29
Payer: COMMERCIAL

## 2024-03-29 DIAGNOSIS — E03.9 ACQUIRED HYPOTHYROIDISM: ICD-10-CM

## 2024-03-29 DIAGNOSIS — Z12.31 SCREENING MAMMOGRAM FOR HIGH-RISK PATIENT: ICD-10-CM

## 2024-03-29 DIAGNOSIS — Z00.00 MEDICARE ANNUAL WELLNESS VISIT, SUBSEQUENT: ICD-10-CM

## 2024-03-29 DIAGNOSIS — Z78.0 POST-MENOPAUSAL: ICD-10-CM

## 2024-03-29 DIAGNOSIS — M25.562 CHRONIC PAIN OF LEFT KNEE: ICD-10-CM

## 2024-03-29 DIAGNOSIS — M17.11 PRIMARY OSTEOARTHRITIS OF RIGHT KNEE: ICD-10-CM

## 2024-03-29 DIAGNOSIS — G89.29 CHRONIC PAIN OF LEFT KNEE: ICD-10-CM

## 2024-03-29 DIAGNOSIS — Z13.6 SCREENING FOR CARDIOVASCULAR CONDITION: ICD-10-CM

## 2024-03-29 DIAGNOSIS — I10 BENIGN ESSENTIAL HYPERTENSION: ICD-10-CM

## 2024-03-29 LAB
ALBUMIN SERPL BCP-MCNC: 4.4 G/DL (ref 3.5–5)
ALP SERPL-CCNC: 55 U/L (ref 34–104)
ALT SERPL W P-5'-P-CCNC: 13 U/L (ref 7–52)
ANION GAP SERPL CALCULATED.3IONS-SCNC: 4 MMOL/L (ref 4–13)
AST SERPL W P-5'-P-CCNC: 17 U/L (ref 13–39)
BILIRUB SERPL-MCNC: 0.88 MG/DL (ref 0.2–1)
BUN SERPL-MCNC: 13 MG/DL (ref 5–25)
CALCIUM SERPL-MCNC: 10.2 MG/DL (ref 8.4–10.2)
CHLORIDE SERPL-SCNC: 102 MMOL/L (ref 96–108)
CHOLEST SERPL-MCNC: 189 MG/DL
CO2 SERPL-SCNC: 33 MMOL/L (ref 21–32)
CREAT SERPL-MCNC: 0.95 MG/DL (ref 0.6–1.3)
ERYTHROCYTE [DISTWIDTH] IN BLOOD BY AUTOMATED COUNT: 13.9 % (ref 11.6–15.1)
GFR SERPL CREATININE-BSD FRML MDRD: 58 ML/MIN/1.73SQ M
GLUCOSE P FAST SERPL-MCNC: 91 MG/DL (ref 65–99)
HCT VFR BLD AUTO: 40 % (ref 34.8–46.1)
HDLC SERPL-MCNC: 74 MG/DL
HGB BLD-MCNC: 13.6 G/DL (ref 11.5–15.4)
LDLC SERPL CALC-MCNC: 101 MG/DL (ref 0–100)
MCH RBC QN AUTO: 29.3 PG (ref 26.8–34.3)
MCHC RBC AUTO-ENTMCNC: 34 G/DL (ref 31.4–37.4)
MCV RBC AUTO: 86 FL (ref 82–98)
PLATELET # BLD AUTO: 227 THOUSANDS/UL (ref 149–390)
PMV BLD AUTO: 9.6 FL (ref 8.9–12.7)
POTASSIUM SERPL-SCNC: 4.4 MMOL/L (ref 3.5–5.3)
PROT SERPL-MCNC: 7.6 G/DL (ref 6.4–8.4)
RBC # BLD AUTO: 4.64 MILLION/UL (ref 3.81–5.12)
SODIUM SERPL-SCNC: 139 MMOL/L (ref 135–147)
TRIGL SERPL-MCNC: 69 MG/DL
TSH SERPL DL<=0.05 MIU/L-ACNC: 1 UIU/ML (ref 0.45–4.5)
WBC # BLD AUTO: 3.73 THOUSAND/UL (ref 4.31–10.16)

## 2024-03-29 PROCEDURE — 84443 ASSAY THYROID STIM HORMONE: CPT

## 2024-03-29 PROCEDURE — 36415 COLL VENOUS BLD VENIPUNCTURE: CPT

## 2024-03-29 PROCEDURE — 80053 COMPREHEN METABOLIC PANEL: CPT

## 2024-03-29 PROCEDURE — 85027 COMPLETE CBC AUTOMATED: CPT

## 2024-03-29 PROCEDURE — 80061 LIPID PANEL: CPT

## 2024-04-15 ENCOUNTER — RA CDI HCC (OUTPATIENT)
Dept: OTHER | Facility: HOSPITAL | Age: 79
End: 2024-04-15

## 2024-04-17 ENCOUNTER — OFFICE VISIT (OUTPATIENT)
Dept: FAMILY MEDICINE CLINIC | Facility: CLINIC | Age: 79
End: 2024-04-17
Payer: COMMERCIAL

## 2024-04-17 VITALS
HEART RATE: 63 BPM | SYSTOLIC BLOOD PRESSURE: 164 MMHG | WEIGHT: 161 LBS | BODY MASS INDEX: 26.79 KG/M2 | DIASTOLIC BLOOD PRESSURE: 80 MMHG | TEMPERATURE: 98 F | RESPIRATION RATE: 16 BRPM

## 2024-04-17 DIAGNOSIS — I10 BENIGN ESSENTIAL HYPERTENSION: Primary | ICD-10-CM

## 2024-04-17 DIAGNOSIS — E03.9 ACQUIRED HYPOTHYROIDISM: ICD-10-CM

## 2024-04-17 DIAGNOSIS — E78.00 ELEVATED LDL CHOLESTEROL LEVEL: ICD-10-CM

## 2024-04-17 DIAGNOSIS — M79.602 ARM PAIN, DIFFUSE, LEFT: ICD-10-CM

## 2024-04-17 DIAGNOSIS — R94.31 ABNORMAL EKG: ICD-10-CM

## 2024-04-17 PROCEDURE — 99214 OFFICE O/P EST MOD 30 MIN: CPT | Performed by: FAMILY MEDICINE

## 2024-04-17 PROCEDURE — 93000 ELECTROCARDIOGRAM COMPLETE: CPT | Performed by: FAMILY MEDICINE

## 2024-04-17 RX ORDER — ROSUVASTATIN CALCIUM 5 MG/1
5 TABLET, COATED ORAL DAILY
Qty: 90 TABLET | Refills: 3 | Status: SHIPPED | OUTPATIENT
Start: 2024-04-17

## 2024-04-17 RX ORDER — AMLODIPINE BESYLATE 2.5 MG/1
2.5 TABLET ORAL DAILY
Qty: 90 TABLET | Refills: 3 | Status: SHIPPED | OUTPATIENT
Start: 2024-04-17

## 2024-04-17 NOTE — PROGRESS NOTES
Assessment/Plan:    1. Benign essential hypertension  -     amLODIPine (NORVASC) 2.5 mg tablet; Take 1 tablet (2.5 mg total) by mouth daily  -     Stress test only, exercise; Future; Expected date: 04/17/2024  -     Comprehensive metabolic panel; Future; Expected date: 07/01/2024  -     Lipid Panel with Direct LDL reflex; Future; Expected date: 07/01/2024  -     TSH, 3rd generation; Future; Expected date: 07/01/2024    2. Acquired hypothyroidism  Assessment & Plan:  stable    Orders:  -     Comprehensive metabolic panel; Future; Expected date: 07/01/2024  -     Lipid Panel with Direct LDL reflex; Future; Expected date: 07/01/2024  -     TSH, 3rd generation; Future; Expected date: 07/01/2024    3. Arm pain, diffuse, left  -     POCT ECG  -     Stress test only, exercise; Future; Expected date: 04/17/2024    4. Elevated LDL cholesterol level  -     rosuvastatin (CRESTOR) 5 mg tablet; Take 1 tablet (5 mg total) by mouth daily  -     Stress test only, exercise; Future; Expected date: 04/17/2024  -     Comprehensive metabolic panel; Future; Expected date: 07/01/2024  -     Lipid Panel with Direct LDL reflex; Future; Expected date: 07/01/2024  -     TSH, 3rd generation; Future; Expected date: 07/01/2024    5. Abnormal EKG  -     Stress test only, exercise; Future; Expected date: 04/17/2024    Lipids are mildly elevated - LDL  Pt has elevated SBP  EKG in office - Sinus Cam, no ischemia  Pt may benefit from CT coronaly calcium score        There are no Patient Instructions on file for this visit.    Return in about 3 months (around 7/17/2024).    Subjective:      Patient ID: Page Mejia is a 78 y.o. female.    Chief Complaint   Patient presents with   • Arm Pain     Sas/cma       Pt states 4-5 days ago pt had a heavy feeling in her left arm  It was for a few seconds  No nausea no vomiting  Pt was picking somethiong up something w the opposite arm    While she is here she states she also had labs she would like to  review          The following portions of the patient's history were reviewed and updated as appropriate: allergies, current medications, past family history, past medical history, past social history, past surgical history and problem list.    Review of Systems   Constitutional: Negative.  Negative for activity change, appetite change, chills, diaphoresis and fatigue.   HENT: Negative.  Negative for dental problem, ear pain, sinus pressure and sore throat.    Eyes: Negative.  Negative for photophobia, pain, discharge, redness, itching and visual disturbance.   Respiratory:  Negative for apnea and chest tightness.    Cardiovascular: Negative.  Negative for chest pain, palpitations and leg swelling.   Gastrointestinal: Negative.  Negative for abdominal distention, abdominal pain, constipation and diarrhea.   Endocrine: Negative.  Negative for cold intolerance and heat intolerance.   Genitourinary: Negative.  Negative for difficulty urinating and dyspareunia.   Musculoskeletal:  Positive for arthralgias. Negative for back pain.   Skin: Negative.    Allergic/Immunologic: Negative for environmental allergies.   Neurological: Negative.  Negative for dizziness.   Psychiatric/Behavioral: Negative.  Negative for agitation.          Current Outpatient Medications   Medication Sig Dispense Refill   • amLODIPine (NORVASC) 2.5 mg tablet Take 1 tablet (2.5 mg total) by mouth daily 90 tablet 3   • aspirin (ECOTRIN LOW STRENGTH) 81 mg EC tablet Take 81 mg by mouth daily     • Calcium Carbonate-Vitamin D (CALCIUM 600+D PO) Take by mouth daily with lunch     • cholecalciferol (VITAMIN D3) 1,000 units tablet Take 1,000 Units by mouth daily     • hydroCHLOROthiazide 12.5 mg tablet Take 1 tablet (12.5 mg total) by mouth daily 90 tablet 1   • levothyroxine 25 mcg tablet Take 1 tablet (25 mcg total) by mouth daily 90 tablet 1   • meloxicam (Mobic) 15 mg tablet Take 1 tablet (15 mg total) by mouth daily 90 tablet 0   • Omega-3 Fatty Acids  (fish oil) 1,000 mg Take 1,000 mg by mouth daily Last dose 5/1/21     • rosuvastatin (CRESTOR) 5 mg tablet Take 1 tablet (5 mg total) by mouth daily 90 tablet 3   • triamcinolone (KENALOG) 0.1 % cream Apply 1 application topically 2 (two) times a day To affected area       No current facility-administered medications for this visit.       Objective:    /80   Pulse 63   Temp 98 °F (36.7 °C)   Resp 16   Wt 73 kg (161 lb)   BMI 26.79 kg/m²        Physical Exam  Vitals and nursing note reviewed.   Constitutional:       General: She is not in acute distress.     Appearance: She is well-developed. She is not diaphoretic.   HENT:      Head: Normocephalic and atraumatic.      Right Ear: External ear normal.      Left Ear: External ear normal.      Nose: Nose normal.      Mouth/Throat:      Pharynx: No oropharyngeal exudate.   Eyes:      General: No scleral icterus.        Right eye: No discharge.         Left eye: No discharge.      Pupils: Pupils are equal, round, and reactive to light.   Neck:      Thyroid: No thyromegaly.   Cardiovascular:      Rate and Rhythm: Normal rate.      Heart sounds: Normal heart sounds. No murmur heard.  Pulmonary:      Effort: Pulmonary effort is normal. No respiratory distress.      Breath sounds: Normal breath sounds. No wheezing.   Abdominal:      General: Bowel sounds are normal. There is no distension.      Palpations: Abdomen is soft. There is no mass.      Tenderness: There is no abdominal tenderness. There is no guarding or rebound.   Musculoskeletal:         General: Normal range of motion.   Skin:     General: Skin is warm and dry.      Findings: No erythema or rash.   Neurological:      Mental Status: She is alert.      Coordination: Coordination normal.      Deep Tendon Reflexes: Reflexes normal.   Psychiatric:         Behavior: Behavior normal.                Frank Lombardi, DO

## 2024-04-29 ENCOUNTER — HOSPITAL ENCOUNTER (OUTPATIENT)
Dept: NON INVASIVE DIAGNOSTICS | Facility: HOSPITAL | Age: 79
Discharge: HOME | End: 2024-04-29
Attending: FAMILY MEDICINE
Payer: COMMERCIAL

## 2024-04-29 DIAGNOSIS — E78.00 ELEVATED LDL CHOLESTEROL LEVEL: ICD-10-CM

## 2024-04-29 DIAGNOSIS — M79.602 ARM PAIN, DIFFUSE, LEFT: ICD-10-CM

## 2024-04-29 DIAGNOSIS — I10 BENIGN ESSENTIAL HYPERTENSION: ICD-10-CM

## 2024-04-29 DIAGNOSIS — R94.31 ABNORMAL EKG: ICD-10-CM

## 2024-04-29 LAB
BASELINE ST DEPRESSION: 0.5 MM
MAX HR PERCENT: 97 %
MAX HR: 141 BPM
RATE PRESSURE PRODUCT: NORMAL
SL CV STRESS RECOVERY BP: 200 MMHG
SL CV STRESS RECOVERY HR: 65 BPM
SL CV STRESS RECOVERY O2 SAT: 99 %
SL CV STRESS STAGE REACHED: 2
STRESS ANGINA INDEX: 0
STRESS BASELINE BP: NORMAL MMHG
STRESS BASELINE HR: 62 BPM
STRESS DUKE TREADMILL SCORE: -3
STRESS O2 SAT REST: 97 %
STRESS PEAK HR: 141 BPM
STRESS POST ESTIMATED WORKLOAD: 7 METS
STRESS POST EXERCISE DUR MIN: 4 MIN
STRESS POST EXERCISE DUR SEC: 26 SEC
STRESS POST O2 SAT PEAK: 99 %
STRESS POST PEAK BP: 160 MMHG
STRESS ST DEPRESSION: 1.4 MM

## 2024-04-29 PROCEDURE — 93016 CV STRESS TEST SUPVJ ONLY: CPT | Performed by: INTERNAL MEDICINE

## 2024-04-29 PROCEDURE — 93018 CV STRESS TEST I&R ONLY: CPT | Performed by: INTERNAL MEDICINE

## 2024-04-29 PROCEDURE — 93017 CV STRESS TEST TRACING ONLY: CPT

## 2024-04-30 ENCOUNTER — TELEPHONE (OUTPATIENT)
Dept: FAMILY MEDICINE CLINIC | Facility: CLINIC | Age: 79
End: 2024-04-30

## 2024-04-30 LAB
CHEST PAIN STATEMENT: NORMAL
MAX DIASTOLIC BP: 80 MMHG
MAX PREDICTED HEART RATE: 144 BPM
PROTOCOL NAME: NORMAL
STRESS POST EXERCISE DUR MIN: 4 MIN
STRESS POST EXERCISE DUR SEC: 26 SEC
STRESS POST PEAK HR: 141 BPM
STRESS POST PEAK SYSTOLIC BP: 200 MMHG
TARGET HR FORMULA: NORMAL
TEST INDICATION: NORMAL

## 2024-05-01 ENCOUNTER — TELEPHONE (OUTPATIENT)
Dept: GASTROENTEROLOGY | Facility: CLINIC | Age: 79
End: 2024-05-01

## 2024-05-01 DIAGNOSIS — G89.29 CHRONIC PAIN OF LEFT KNEE: ICD-10-CM

## 2024-05-01 DIAGNOSIS — M25.562 CHRONIC PAIN OF LEFT KNEE: ICD-10-CM

## 2024-05-01 DIAGNOSIS — M17.11 PRIMARY OSTEOARTHRITIS OF RIGHT KNEE: ICD-10-CM

## 2024-05-01 NOTE — TELEPHONE ENCOUNTER
Reason for call:   [x] Refill   [] Prior Auth  [] Other:     Office:   [x] PCP/Provider - Dr Lombardi  [] Specialty/Provider -     Medication:   Meloxicam 15 mg, 1 qd, 90        Pharmacy: [facility] [place]    Does the patient have enough for 3 days?   [x] Yes   [] No - Send as HP to POD

## 2024-05-03 ENCOUNTER — TELEPHONE (OUTPATIENT)
Age: 79
End: 2024-05-03

## 2024-05-03 ENCOUNTER — OFFICE VISIT (OUTPATIENT)
Dept: GASTROENTEROLOGY | Facility: CLINIC | Age: 79
End: 2024-05-03
Payer: COMMERCIAL

## 2024-05-03 VITALS
HEIGHT: 65 IN | BODY MASS INDEX: 26.49 KG/M2 | WEIGHT: 159 LBS | OXYGEN SATURATION: 97 % | SYSTOLIC BLOOD PRESSURE: 147 MMHG | HEART RATE: 77 BPM | DIASTOLIC BLOOD PRESSURE: 83 MMHG

## 2024-05-03 DIAGNOSIS — R94.39 ABNORMAL STRESS ECG: ICD-10-CM

## 2024-05-03 DIAGNOSIS — Z86.010 HISTORY OF COLON POLYPS: Primary | ICD-10-CM

## 2024-05-03 DIAGNOSIS — Z12.11 SCREEN FOR COLON CANCER: ICD-10-CM

## 2024-05-03 DIAGNOSIS — I10 BENIGN ESSENTIAL HYPERTENSION: Primary | ICD-10-CM

## 2024-05-03 DIAGNOSIS — R94.31 ABNORMAL EKG: ICD-10-CM

## 2024-05-03 PROBLEM — Z86.0100 HISTORY OF COLON POLYPS: Status: ACTIVE | Noted: 2024-05-03

## 2024-05-03 PROCEDURE — 99213 OFFICE O/P EST LOW 20 MIN: CPT | Performed by: INTERNAL MEDICINE

## 2024-05-03 RX ORDER — MELOXICAM 15 MG/1
15 TABLET ORAL DAILY
Qty: 90 TABLET | Refills: 0 | Status: SHIPPED | OUTPATIENT
Start: 2024-05-03

## 2024-05-03 NOTE — PATIENT INSTRUCTIONS
Scheduled date of colonoscopy (as of today): 7/26/24  Physician performing colonoscopy: [place]  Location of colonoscopy: [facility]  Bowel prep reviewed with patient: Miralax/Dulcolax  Instructions reviewed with patient by: Kaitlyn MEDEL  Clearances: n/a

## 2024-05-03 NOTE — TELEPHONE ENCOUNTER
Patient called and wanted results of stress test. RN did not go over results as there is not a note from Dr. Lombardi.  She also wants Dr. Lombardi to know that she has stopped taking the Norvasc as it was giving her headaches and she stated her blood pressure was too low.  She is staying on her HCTZ. Patient would like call back about stress test results and BP medication management.

## 2024-05-03 NOTE — PROGRESS NOTES
[facility]  Page Mejia 78 y.o. female MRN: [id number]            Assessment & Plan:  Leda 78-year-old female, here for surveillance of colon polyps.  No significant GI symptoms.  Recent mild abnormality and stress test.    1.  History of colon polyps:  -Will schedule patient for colonoscopy sometime in August which should allow ample time for further cardiac workup if needed  -She will also be returning back from [facility] at that time  -Will schedule colonoscopy with Dulcolax MiraLAX bowel prep  -Discussed with the risks of procedure including bleeding, surgery, perforation, missed polyp detection rate      Page was seen today for schedule colonoscopy.    Diagnoses and all orders for this visit:    History of colon polyps  -     Colonoscopy; Future    Screen for colon cancer  -     Ambulatory referral to Gastroenterology    Other orders  -     Diet NPO; Sips with meds; Standing  -     Void on call to OR; Standing            _____________________________________________________________        CC: History of colon polyps    HPI:  Page Mejia is a 78 y.o.female who is here for history of colon polyps.  This is a pleasant 78-year-old female, history of hypertension, history of several adenomatous polyps on her last colonoscopy in 2021.  Patient reports that she has been doing well, no new symptoms.  Denies any nausea, vomiting, heartburn, dysphagia, diarrhea, constipation, melena, rectal bleeding    Patient had some mild recent shortness of breath, underwent stress test, there is some mild EKG changes.    She may need additional testing.    Patient is scheduled to go to [facility] about 1 week from today.      ROS:  The remainder of the ROS was negative except for the pertinent positives mentioned in HPI.      Allergies: Patient has no known allergies.    Medications:   Current Outpatient Medications:     amLODIPine (NORVASC) 2.5 mg tablet, Take 1 tablet (2.5 mg total) by mouth daily, Disp: 90  tablet, Rfl: 3    aspirin (ECOTRIN LOW STRENGTH) 81 mg EC tablet, Take 81 mg by mouth daily, Disp: , Rfl:     Calcium Carbonate-Vitamin D (CALCIUM 600+D PO), Take by mouth daily with lunch, Disp: , Rfl:     cholecalciferol (VITAMIN D3) 1,000 units tablet, Take 1,000 Units by mouth daily, Disp: , Rfl:     hydroCHLOROthiazide 12.5 mg tablet, Take 1 tablet (12.5 mg total) by mouth daily, Disp: 90 tablet, Rfl: 1    levothyroxine 25 mcg tablet, Take 1 tablet (25 mcg total) by mouth daily, Disp: 90 tablet, Rfl: 1    meloxicam (Mobic) 15 mg tablet, Take 1 tablet (15 mg total) by mouth daily, Disp: 90 tablet, Rfl: 0    Omega-3 Fatty Acids (fish oil) 1,000 mg, Take 1,000 mg by mouth daily Last dose 21, Disp: , Rfl:     rosuvastatin (CRESTOR) 5 mg tablet, Take 1 tablet (5 mg total) by mouth daily, Disp: 90 tablet, Rfl: 3    triamcinolone (KENALOG) 0.1 % cream, Apply 1 application topically 2 (two) times a day To affected area, Disp: , Rfl:     Past Medical History:   Diagnosis Date    Arthritis     right knee, leg    Colon polyp     Disease of thyroid gland     hypo    Leukocytosis     last assessed: 10/21/2016    Wears glasses        Past Surgical History:   Procedure Laterality Date     SECTION      5 c sec    COLONOSCOPY N/A 2016    Procedure: COLONOSCOPY;  Surgeon: Luan Mccormack MD;  Location: [facility] GI LAB;  Service:     OR XCAPSL CTRC RMVL INSJ IO LENS PROSTH W/O ECP Right 2023    Procedure: EXTRACTION EXTRACAPSULAR CATARACT PHACO INTRAOCULAR LENS (IOL);  Surgeon: Pete Manzanares MD;  Location: [facility] MAIN OR;  Service: Ophthalmology    OR XCAPSL CTRC RMVL INSJ IO LENS PROSTH W/O ECP Left 3/13/2023    Procedure: EXTRACTION EXTRACAPSULAR CATARACT PHACO INTRAOCULAR LENS (IOL);  Surgeon: Pete Manzanares MD;  Location: [facility] MAIN OR;  Service: Ophthalmology       Family History   Problem Relation Age of Onset    Arthritis Mother         Knee    Hypertension Mother     Heart disease Mother         Pacemaker  "Placement    Hypertension Father     Heart disease Brother     No Known Problems Sister     No Known Problems Daughter     No Known Problems Sister     No Known Problems Daughter     No Known Problems Daughter     No Known Problems Daughter     Mental illness Neg Hx         reports that she has never smoked. She has never been exposed to tobacco smoke. She has never used smokeless tobacco. She reports current alcohol use. She reports that she does not use drugs.      Physical Exam:    /83 (BP Location: Right arm, Patient Position: Sitting, Cuff Size: Standard)   Pulse 77   Ht 5' 5\" (1.651 m)   Wt 72.1 kg (159 lb)   SpO2 97%   BMI 26.46 kg/m²     Gen: wn/wd, NAD  HEENT: anicteric, MMM, no cervical LAD  CVS: RRR, mild systolic murmur   CHEST: CTA b/l  ABD: +BS, soft, NT,ND, no hepatosplenomegaly  EXT: no c/c/e  NEURO: aaox3  SKIN: NO rashes    "

## 2024-05-03 NOTE — TELEPHONE ENCOUNTER
Pt traveling to [facility], she will be gone as of Sunday, she made an appt for May 10th @ 10:45  She will take the Norvasc as directed.  Jacquie Tracey

## 2024-05-03 NOTE — TELEPHONE ENCOUNTER
Stress results were back - looks like the study was equivical, however she had an elevated BP response so not taking the noirvasc is probably not a great idea malcom with hs low a dose she was on.  I would restrt the [facility] vasc and after a week have a BP follow up.  I would also want her to do another stress test wioth perfusion imaging whichj I will order

## 2024-06-10 ENCOUNTER — HOSPITAL ENCOUNTER (OUTPATIENT)
Dept: RADIOLOGY | Facility: HOSPITAL | Age: 79
Discharge: HOME | End: 2024-06-10
Attending: FAMILY MEDICINE
Payer: COMMERCIAL

## 2024-06-10 ENCOUNTER — OFFICE VISIT (OUTPATIENT)
Dept: FAMILY MEDICINE CLINIC | Facility: CLINIC | Age: 79
End: 2024-06-10
Payer: COMMERCIAL

## 2024-06-10 ENCOUNTER — HOSPITAL ENCOUNTER (OUTPATIENT)
Dept: NON INVASIVE DIAGNOSTICS | Facility: HOSPITAL | Age: 79
Discharge: HOME | End: 2024-06-10
Attending: FAMILY MEDICINE
Payer: COMMERCIAL

## 2024-06-10 VITALS
SYSTOLIC BLOOD PRESSURE: 156 MMHG | OXYGEN SATURATION: 98 % | HEART RATE: 59 BPM | DIASTOLIC BLOOD PRESSURE: 80 MMHG | RESPIRATION RATE: 20 BRPM

## 2024-06-10 VITALS
WEIGHT: 155 LBS | TEMPERATURE: 97.6 F | HEART RATE: 64 BPM | SYSTOLIC BLOOD PRESSURE: 132 MMHG | BODY MASS INDEX: 25.83 KG/M2 | RESPIRATION RATE: 18 BRPM | HEIGHT: 65 IN | DIASTOLIC BLOOD PRESSURE: 82 MMHG

## 2024-06-10 DIAGNOSIS — I10 BENIGN ESSENTIAL HYPERTENSION: ICD-10-CM

## 2024-06-10 DIAGNOSIS — E03.9 ACQUIRED HYPOTHYROIDISM: ICD-10-CM

## 2024-06-10 DIAGNOSIS — I10 BENIGN ESSENTIAL HYPERTENSION: Primary | ICD-10-CM

## 2024-06-10 DIAGNOSIS — R94.39 ABNORMAL STRESS ECG: ICD-10-CM

## 2024-06-10 DIAGNOSIS — E78.00 ELEVATED LDL CHOLESTEROL LEVEL: ICD-10-CM

## 2024-06-10 DIAGNOSIS — R94.31 ABNORMAL EKG: ICD-10-CM

## 2024-06-10 LAB
CHEST PAIN STATEMENT: NORMAL
MAX DIASTOLIC BP: 82 MMHG
MAX HR PERCENT: 96 %
MAX HR: 139 BPM
MAX PREDICTED HEART RATE: 144 BPM
PROTOCOL NAME: NORMAL
RATE PRESSURE PRODUCT: NORMAL
REASON FOR TERMINATION: NORMAL
SL CV REST NUCLEAR ISOTOPE DOSE: 10.4 MCI
SL CV STRESS NUCLEAR ISOTOPE DOSE: 32.8 MCI
SL CV STRESS RECOVERY BP: NORMAL MMHG
SL CV STRESS RECOVERY HR: 64 BPM
SL CV STRESS RECOVERY O2 SAT: 98 %
SL CV STRESS STAGE REACHED: 2
STRESS ANGINA INDEX: 0
STRESS BASELINE BP: 59 MMHG
STRESS BASELINE HR: 2 BPM
STRESS O2 SAT REST: 98 %
STRESS PEAK HR: 136 BPM
STRESS POST ESTIMATED WORKLOAD: 7 METS
STRESS POST EXERCISE DUR MIN: 6 MIN
STRESS POST EXERCISE DUR MIN: 6 MIN
STRESS POST EXERCISE DUR SEC: 0 SEC
STRESS POST O2 SAT PEAK: 99 %
STRESS POST PEAK BP: 200 MMHG
STRESS POST PEAK HR: 139 BPM
STRESS POST PEAK SYSTOLIC BP: 200 MMHG
TARGET HR FORMULA: NORMAL
TEST INDICATION: NORMAL

## 2024-06-10 PROCEDURE — 93018 CV STRESS TEST I&R ONLY: CPT | Performed by: INTERNAL MEDICINE

## 2024-06-10 PROCEDURE — 93016 CV STRESS TEST SUPVJ ONLY: CPT | Performed by: INTERNAL MEDICINE

## 2024-06-10 PROCEDURE — 78452 HT MUSCLE IMAGE SPECT MULT: CPT | Performed by: INTERNAL MEDICINE

## 2024-06-10 PROCEDURE — 99214 OFFICE O/P EST MOD 30 MIN: CPT | Performed by: NURSE PRACTITIONER

## 2024-06-10 PROCEDURE — A9502 TC99M TETROFOSMIN: HCPCS

## 2024-06-10 PROCEDURE — G2211 COMPLEX E/M VISIT ADD ON: HCPCS | Performed by: NURSE PRACTITIONER

## 2024-06-10 PROCEDURE — 93017 CV STRESS TEST TRACING ONLY: CPT

## 2024-06-10 PROCEDURE — 78452 HT MUSCLE IMAGE SPECT MULT: CPT

## 2024-06-10 NOTE — PROGRESS NOTES
"Assessment/Plan:    1. Benign essential hypertension  Assessment & Plan:  Stable with current regimen  2. Acquired hypothyroidism  Assessment & Plan:  Complaint with current dose of levothyroxine.  Lab Results   Component Value Date    [id number] 1.005 03/29/2024      3. Elevated LDL cholesterol level  Assessment & Plan:  Complaint with statin and tolerating it well        Patient Instructions:  Supportive care discussed and advised.  Advised to RTO for any worsening and no improvement.   Follow up for no improvement and worsening of conditions.  Patient advised and educated when to see immediate medical care.    Return in about 3 months (around 9/10/2024), or if symptoms worsen or fail to improve.      Future Appointments   Date Time Provider Department Center   7/26/2024  9:00 AM MD [facility]           Subjective:      Patient ID: Page Mejia is a 78 y.o. female.    Chief Complaint   Patient presents with   • Hypertension     Lw cma         Vitals:  /82   Pulse 64   Temp 97.6 °F (36.4 °C) (Temporal)   Resp 18   Ht 5' 5\" (1.651 m)   Wt 70.3 kg (155 lb)   BMI 25.79 kg/m²     HPI  Patient is here to follow up on HTN.  Stated that initially norvasc gave her headache and was taking intermittently and now been consistent with it from a week and tolerating it well and denies any headache, dizziness, sob and chest pain.  Had stress done today and will call once get results.  Complaint with medications and tolerating it well              The following portions of the patient's history were reviewed and updated as appropriate: allergies, current medications, past family history, past medical history, past social history, past surgical history and problem list.      Review of Systems   HENT: Negative.     Respiratory: Negative.     Cardiovascular: Negative.    Gastrointestinal: Negative.    Neurological:  Negative for dizziness and headaches.         Objective:    Social History "     Tobacco Use   Smoking Status Never   • Passive exposure: Never   Smokeless Tobacco Never       Allergies: No Known Allergies      Current Outpatient Medications   Medication Sig Dispense Refill   • amLODIPine (NORVASC) 2.5 mg tablet Take 1 tablet (2.5 mg total) by mouth daily 90 tablet 3   • aspirin (ECOTRIN LOW STRENGTH) 81 mg EC tablet Take 81 mg by mouth daily     • Calcium Carbonate-Vitamin D (CALCIUM 600+D PO) Take by mouth daily with lunch     • cholecalciferol (VITAMIN D3) 1,000 units tablet Take 1,000 Units by mouth daily     • hydroCHLOROthiazide 12.5 mg tablet Take 1 tablet (12.5 mg total) by mouth daily 90 tablet 1   • levothyroxine 25 mcg tablet Take 1 tablet (25 mcg total) by mouth daily 90 tablet 1   • meloxicam (Mobic) 15 mg tablet Take 1 tablet (15 mg total) by mouth daily 90 tablet 0   • Omega-3 Fatty Acids (fish oil) 1,000 mg Take 1,000 mg by mouth daily Last dose 5/1/21     • rosuvastatin (CRESTOR) 5 mg tablet Take 1 tablet (5 mg total) by mouth daily 90 tablet 3   • triamcinolone (KENALOG) 0.1 % cream Apply 1 application topically 2 (two) times a day To affected area       No current facility-administered medications for this visit.          Physical Exam  Constitutional:       Appearance: Normal appearance.   HENT:      Head: Normocephalic and atraumatic.      Nose: Nose normal.   Eyes:      Conjunctiva/sclera: Conjunctivae normal.   Cardiovascular:      Rate and Rhythm: Normal rate and regular rhythm.      Pulses: Normal pulses.      Heart sounds: Normal heart sounds.   Pulmonary:      Effort: Pulmonary effort is normal.      Breath sounds: Normal breath sounds.   Skin:     General: Skin is warm and dry.      Findings: No rash.   Neurological:      Mental Status: She is alert and oriented to person, place, and time.   Psychiatric:         Mood and Affect: Mood normal.         Behavior: Behavior normal.         Thought Content: Thought content normal.         Judgment: Judgment normal.                      VAIBHAV Minaya

## 2024-06-10 NOTE — ASSESSMENT & PLAN NOTE
Complaint with current dose of levothyroxine.  Lab Results   Component Value Date    [id number] 1.005 03/29/2024

## 2024-06-11 ENCOUNTER — TELEPHONE (OUTPATIENT)
Age: 79
End: 2024-06-11

## 2024-06-11 DIAGNOSIS — R94.39 ABNORMAL STRESS ECG: Primary | ICD-10-CM

## 2024-06-11 NOTE — TELEPHONE ENCOUNTER
Spoke with pt discussed results of stress testing we decided on being seen by cardio and will follow.  Order in phone number given

## 2024-06-11 NOTE — TELEPHONE ENCOUNTER
Patient said she received a call but no message from this office. She stated she had a stress test yesterday, maybe they were calling with her results.  Please advise.  Thank you.

## 2024-06-28 ENCOUNTER — TELEPHONE (OUTPATIENT)
Age: 79
End: 2024-06-28

## 2024-06-28 DIAGNOSIS — M25.562 CHRONIC PAIN OF LEFT KNEE: Primary | ICD-10-CM

## 2024-06-28 DIAGNOSIS — G89.29 CHRONIC PAIN OF LEFT KNEE: Primary | ICD-10-CM

## 2024-07-10 ENCOUNTER — OFFICE VISIT (OUTPATIENT)
Dept: OBGYN CLINIC | Facility: CLINIC | Age: 79
End: 2024-07-10
Payer: COMMERCIAL

## 2024-07-10 VITALS
HEART RATE: 54 BPM | BODY MASS INDEX: 26.56 KG/M2 | WEIGHT: 159.4 LBS | DIASTOLIC BLOOD PRESSURE: 70 MMHG | SYSTOLIC BLOOD PRESSURE: 137 MMHG | HEIGHT: 65 IN

## 2024-07-10 DIAGNOSIS — M17.0 PRIMARY OSTEOARTHRITIS OF BOTH KNEES: Primary | ICD-10-CM

## 2024-07-10 DIAGNOSIS — G89.29 CHRONIC PAIN OF BOTH KNEES: ICD-10-CM

## 2024-07-10 DIAGNOSIS — M25.562 CHRONIC PAIN OF BOTH KNEES: ICD-10-CM

## 2024-07-10 DIAGNOSIS — M25.561 CHRONIC PAIN OF BOTH KNEES: ICD-10-CM

## 2024-07-10 PROCEDURE — 20610 DRAIN/INJ JOINT/BURSA W/O US: CPT | Performed by: ORTHOPAEDIC SURGERY

## 2024-07-10 PROCEDURE — 99214 OFFICE O/P EST MOD 30 MIN: CPT | Performed by: ORTHOPAEDIC SURGERY

## 2024-07-10 RX ADMIN — TRIAMCINOLONE ACETONIDE 80 MG: 40 INJECTION, SUSPENSION INTRA-ARTICULAR; INTRAMUSCULAR at 14:00

## 2024-07-10 RX ADMIN — BUPIVACAINE HYDROCHLORIDE 2 ML: 5 INJECTION, SOLUTION EPIDURAL; INTRACAUDAL at 14:00

## 2024-07-10 NOTE — PROGRESS NOTES
Assessment/Plan:  1. Primary osteoarthritis of both knees  Ambulatory Referral to Physical Therapy    Large joint arthrocentesis: R knee      2. Chronic pain of both knees  Ambulatory referral to Orthopedic Surgery        Scribe Attestation      I,:  Ada Lew MD am acting as a scribe while in the presence of the attending physician.:       I,:  Kristopher Villela, DO personally performed the services described in this documentation    as scribed in my presence.:           Page is a pleasant 78 year old female who presents today for initial evaluation of chronic bilateral knee pain. Her symptoms, examination, and imaging are indicative of bilateral primary knee osteoarthritis, albeit her images are non-weight bearing. We will initiate conservative treatment in the form of PT and Tylenol PRN. A prescription for PT was provided today. Additionally, a CSI for her more symptomatic right knee was offered, accepted, and provided in clinic today. Details as per below. Post injection instructions provided. Discussed that should the injection provide symptomatic relief, repeat CSI can be undertaken no sooner than 3 months. Follow up in 3 months or PRN.    Large joint arthrocentesis: R knee  Universal Protocol:  Consent: Verbal consent obtained.  Risks and benefits: risks, benefits and alternatives were discussed  Consent given by: patient  Patient understanding: patient states understanding of the procedure being performed  Patient identity confirmed: verbally with patient  Supporting Documentation  Indications: pain   Procedure Details  Location: knee - R knee  Preparation: Patient was prepped and draped in the usual sterile fashion  Needle size: 20 G  Ultrasound guidance: no  Approach: anterolateral  Medications administered: 2 mL bupivacaine (PF) 0.5 %; 80 mg triamcinolone acetonide 40 mg/mL    Patient tolerance: patient tolerated the procedure well with no immediate complications  Dressing:  Sterile dressing  applied            Subjective: pain of bilateral knees    Patient ID: Page Mejia is a 78 y.o. female who presents today for initial evaluation of bilateral knee pain. She reports moderate activity related bilateral knee pain, right > left, that has been progressive in nature, worse with weight bearing, and made better with rest. Denies mechanical symptoms. After being in a position for a prolonged period of time, she describes a feeling of tightness posteriorly requiring gradual stretch to overcome this stiffness. No prior treatment. Notes that her symptoms have slightly improved with the use of blessed oil from her [employer]. She ambulates without an assist.    Review of Systems   Constitutional:  Positive for activity change. Negative for chills and fever.   HENT:  Negative for ear pain and sore throat.    Eyes:  Negative for pain and visual disturbance.   Respiratory:  Negative for cough and shortness of breath.    Cardiovascular:  Negative for chest pain and palpitations.   Gastrointestinal:  Negative for abdominal pain and vomiting.   Genitourinary:  Negative for dysuria and hematuria.   Musculoskeletal:  Positive for arthralgias. Negative for back pain.   Skin:  Negative for color change and rash.   Neurological:  Negative for seizures and syncope.   All other systems reviewed and are negative.        Past Medical History:   Diagnosis Date    Arthritis     right knee, leg    Colon polyp     Disease of thyroid gland     hypo    Leukocytosis     last assessed: 10/21/2016    Wears glasses        Past Surgical History:   Procedure Laterality Date     SECTION      5 c sec    COLONOSCOPY N/A 2016    Procedure: COLONOSCOPY;  Surgeon: Luan Mccormack MD;  Location: [facility] GI LAB;  Service:     NC XCAPSL CTRC RMVL INSJ IO LENS PROSTH W/O ECP Right 2023    Procedure: EXTRACTION EXTRACAPSULAR CATARACT PHACO INTRAOCULAR LENS (IOL);  Surgeon: Pete Manzanares MD;  Location: [facility] MAIN OR;  Service:  Ophthalmology    SD XCAPSL CTRC RMVL INSJ IO LENS PROSTH W/O ECP Left 3/13/2023    Procedure: EXTRACTION EXTRACAPSULAR CATARACT PHACO INTRAOCULAR LENS (IOL);  Surgeon: Pete Manzanares MD;  Location: [facility] MAIN OR;  Service: Ophthalmology       Family History   Problem Relation Age of Onset    Arthritis Mother         Knee    Hypertension Mother     Heart disease Mother         Pacemaker Placement    Hypertension Father     Heart disease Brother     No Known Problems Sister     No Known Problems Daughter     No Known Problems Sister     No Known Problems Daughter     No Known Problems Daughter     No Known Problems Daughter     Mental illness Neg Hx        Social History     Occupational History    Not on file   Tobacco Use    Smoking status: Never     Passive exposure: Never    Smokeless tobacco: Never   Vaping Use    Vaping status: Never Used   Substance and Sexual Activity    Alcohol use: Yes     Comment: rare    Drug use: Never    Sexual activity: Not Currently     Birth control/protection: Post-menopausal         Current Outpatient Medications:     amLODIPine (NORVASC) 2.5 mg tablet, Take 1 tablet (2.5 mg total) by mouth daily, Disp: 90 tablet, Rfl: 3    aspirin (ECOTRIN LOW STRENGTH) 81 mg EC tablet, Take 81 mg by mouth daily, Disp: , Rfl:     Calcium Carbonate-Vitamin D (CALCIUM 600+D PO), Take by mouth daily with lunch, Disp: , Rfl:     cholecalciferol (VITAMIN D3) 1,000 units tablet, Take 1,000 Units by mouth daily, Disp: , Rfl:     hydroCHLOROthiazide 12.5 mg tablet, Take 1 tablet (12.5 mg total) by mouth daily, Disp: 90 tablet, Rfl: 1    levothyroxine 25 mcg tablet, Take 1 tablet (25 mcg total) by mouth daily, Disp: 90 tablet, Rfl: 1    meloxicam (Mobic) 15 mg tablet, Take 1 tablet (15 mg total) by mouth daily, Disp: 90 tablet, Rfl: 0    Omega-3 Fatty Acids (fish oil) 1,000 mg, Take 1,000 mg by mouth daily Last dose 5/1/21, Disp: , Rfl:     rosuvastatin (CRESTOR) 5 mg tablet, Take 1 tablet (5 mg total) by  mouth daily, Disp: 90 tablet, Rfl: 3    triamcinolone (KENALOG) 0.1 % cream, Apply 1 application topically 2 (two) times a day To affected area, Disp: , Rfl:     No Known Allergies    Objective:  Vitals:    07/10/24 1404   BP: 137/70   Pulse: (!) 54       Body mass index is 26.53 kg/m².    Right Knee Exam     Tenderness   The patient is experiencing no tenderness.     Range of Motion   Extension:  0   Flexion:  120     Tests   Varus: negative Valgus: negative  Lachman:  Anterior - negative    Posterior - negative  Drawer:  Anterior - negative    Posterior - negative    Other   Erythema: absent  Scars: absent  Sensation: normal  Pulse: present  Swelling: none  Effusion: no effusion present    Comments:  Walks without an assist  Bilateral baker's cyst  Neutral alignment bilaterally      Left Knee Exam     Tenderness   The patient is experiencing no tenderness.     Range of Motion   Extension:  0   Flexion:  130     Tests   Varus: negative Valgus: negative  Lachman:  Anterior - negative    Posterior - negative  Drawer:  Anterior - negative     Posterior - negative    Other   Erythema: absent  Scars: absent  Sensation: normal  Pulse: present  Swelling: none  Effusion: no effusion present          Observations   Left Knee   Negative for effusion.     Right Knee   Negative for effusion.       Physical Exam  Vitals and nursing note reviewed.   Constitutional:       General: She is not in acute distress.     Appearance: Normal appearance.   HENT:      Head: Atraumatic.      Right Ear: External ear normal.      Left Ear: External ear normal.   Eyes:      Extraocular Movements: Extraocular movements intact.      Conjunctiva/sclera: Conjunctivae normal.   Cardiovascular:      Rate and Rhythm: Normal rate.      Pulses: Normal pulses.   Pulmonary:      Effort: Pulmonary effort is normal.   Musculoskeletal:      Right knee: No effusion.      Left knee: No effusion.      Comments: See Ortho Exam   Skin:     General: Skin is warm  and dry.      Capillary Refill: Capillary refill takes less than 2 seconds.   Neurological:      Mental Status: She is alert and oriented to person, place, and time.   Psychiatric:         Mood and Affect: Mood normal.         I have personally reviewed pertinent films in PACS.    Imaging of bilateral knees, albeit non weight bearing films, does demonstrate some asymmetric joint space narrowing, tricompartmental osteophyte formation, and subchondral sclerosis.    This document was created using speech voice recognition software.   Grammatical errors, random word insertions, pronoun errors, and incomplete sentences are an occasional consequence of this system due to software limitations, ambient noise, and hardware issues.   Any formal questions or concerns about content, text, or information contained within the body of this dictation should be directly addressed to the provider for clarification.

## 2024-07-11 RX ORDER — BUPIVACAINE HYDROCHLORIDE 5 MG/ML
2 INJECTION, SOLUTION EPIDURAL; INTRACAUDAL
Status: COMPLETED | OUTPATIENT
Start: 2024-07-10 | End: 2024-07-10

## 2024-07-11 RX ORDER — TRIAMCINOLONE ACETONIDE 40 MG/ML
80 INJECTION, SUSPENSION INTRA-ARTICULAR; INTRAMUSCULAR
Status: COMPLETED | OUTPATIENT
Start: 2024-07-10 | End: 2024-07-10

## 2024-07-12 ENCOUNTER — ANESTHESIA (OUTPATIENT)
Dept: ANESTHESIOLOGY | Facility: HOSPITAL | Age: 79
End: 2024-07-12

## 2024-07-12 ENCOUNTER — ANESTHESIA EVENT (OUTPATIENT)
Dept: ANESTHESIOLOGY | Facility: HOSPITAL | Age: 79
End: 2024-07-12

## 2024-07-15 ENCOUNTER — TELEPHONE (OUTPATIENT)
Dept: GASTROENTEROLOGY | Facility: CLINIC | Age: 79
End: 2024-07-15

## 2024-07-15 NOTE — TELEPHONE ENCOUNTER
Dr Mccormack's Office aware of cardiology apt.  They will call pt to reschedule the Colonoscopy.  [id number]  
Dr. Lombard    Our mutual patient is scheduled for procedure: Colonoscopy    On: ___7_/_ 26   _/_  24  _      With: Dr. Matute________    He/She is taking the following blood thinner:  n/a        Can this be stopped ___n/a___ days prior to the procedure?        You had ordered a stress test and apparently it was abnormal. Anesthesia is asking she be cleared prior to her colonoscopy next Friday. Please advise and thank you!      Physician Approving clearance: ________________________      
Pt has an appt on August 8th   
2-3x/week

## 2024-07-15 NOTE — TELEPHONE ENCOUNTER
Patient has a consult 8/5 with Dr. Chand to cardiac clearance for her 10/4 colonoscopy I rescheduled from 7/26. Will send new clearance to Dr. Chand if I don't get a note from their office after her 8/5 consultation.

## 2024-07-15 NOTE — TELEPHONE ENCOUNTER
Sent clearance to Dr. Clifton for an abnormal stress test he had ordered. Pt on for a colonoscopy at [facility]  7/26 with Dr. Mccormack. Will check by the end of the week if it doesn't come back.

## 2024-07-24 DIAGNOSIS — E03.9 ACQUIRED HYPOTHYROIDISM: ICD-10-CM

## 2024-07-24 DIAGNOSIS — I10 BENIGN ESSENTIAL HYPERTENSION: ICD-10-CM

## 2024-07-24 RX ORDER — HYDROCHLOROTHIAZIDE 12.5 MG/1
12.5 TABLET ORAL DAILY
Qty: 100 TABLET | Refills: 1 | Status: SHIPPED | OUTPATIENT
Start: 2024-07-24

## 2024-07-24 RX ORDER — LEVOTHYROXINE SODIUM 0.03 MG/1
25 TABLET ORAL DAILY
Qty: 100 TABLET | Refills: 1 | Status: SHIPPED | OUTPATIENT
Start: 2024-07-24

## 2024-07-25 ENCOUNTER — TELEPHONE (OUTPATIENT)
Age: 79
End: 2024-07-25

## 2024-07-25 NOTE — TELEPHONE ENCOUNTER
Patient called requesting refill for levothyroxine 25 mcg & hydrochlorothiazide 12.5 mg . Patient made aware medication was refilled on 07/24/24 for 100 with 1 refills to [facility] pharmacy. Patient instructed to contact the pharmacy to obtain refills of medication. Patient verbalized understanding.

## 2024-08-05 ENCOUNTER — CONSULT (OUTPATIENT)
Dept: CARDIOLOGY CLINIC | Facility: CLINIC | Age: 79
End: 2024-08-05
Payer: COMMERCIAL

## 2024-08-05 VITALS
DIASTOLIC BLOOD PRESSURE: 80 MMHG | WEIGHT: 159 LBS | BODY MASS INDEX: 26.49 KG/M2 | HEART RATE: 59 BPM | SYSTOLIC BLOOD PRESSURE: 148 MMHG | HEIGHT: 65 IN | OXYGEN SATURATION: 99 %

## 2024-08-05 DIAGNOSIS — E78.5 DYSLIPIDEMIA: ICD-10-CM

## 2024-08-05 DIAGNOSIS — I27.20 PULMONARY HYPERTENSION (HCC): ICD-10-CM

## 2024-08-05 DIAGNOSIS — E03.9 ACQUIRED HYPOTHYROIDISM: ICD-10-CM

## 2024-08-05 DIAGNOSIS — R94.39 ABNORMAL STRESS ECG: ICD-10-CM

## 2024-08-05 DIAGNOSIS — I11.9 HYPERTENSIVE HEART DISEASE WITHOUT HEART FAILURE: ICD-10-CM

## 2024-08-05 DIAGNOSIS — I10 BENIGN ESSENTIAL HYPERTENSION: ICD-10-CM

## 2024-08-05 DIAGNOSIS — R01.1 CARDIAC MURMUR: ICD-10-CM

## 2024-08-05 PROCEDURE — 93000 ELECTROCARDIOGRAM COMPLETE: CPT | Performed by: INTERNAL MEDICINE

## 2024-08-05 PROCEDURE — 99214 OFFICE O/P EST MOD 30 MIN: CPT | Performed by: INTERNAL MEDICINE

## 2024-08-05 RX ORDER — AMLODIPINE BESYLATE 5 MG/1
5 TABLET ORAL DAILY
Qty: 90 TABLET | Refills: 1 | Status: SHIPPED | OUTPATIENT
Start: 2024-08-05

## 2024-08-05 NOTE — PROGRESS NOTES
Consultation - Cardiology Office  [facility].    Page Mejia 78 y.o. female MRN: [id number]  : 1945  Unit/Bed#:  Encounter: [id number]      Assessment:     1. Hypertensive heart disease without heart failure    2. Cardiac murmur    3. Pulmonary hypertension (HCC)    4. Dyslipidemia    5. Acquired hypothyroidism    6. Abnormal stress ECG    7. Benign essential hypertension        Discussion summary and Plan:      1.  Hypertensive heart disease without heart failure.  Her blood pressure still borderline controlled blood pressure checked by me was systolic 150.  Will increase amlodipine to 5 mg daily continue hydrochlorothiazide.  Dietary changes advised.    2.  Cardiac murmur.  She had a mild systolic murmur.  Echo from 2019 reviewed.    3.  Pulmonary hypertension with last echo in 2019    4.  Dyslipidemia has been started on statins    5.  History of hypothyroidism on levothyroxine    6.  Abnormal stress EKG but nonischemic nuclear with episodes of frequent PVCs and PSVT.  Currently asymptomatic.    Plan.  Increase amlodipine to 5 mg daily.  Monitor blood pressure.  DASH diet.  Check echo Doppler due to history of pulmonary hypertension her stress test report reviewed with her EKG reviewed.      Patient  was advised and educated to call our office  immediately if  patient has any new symptoms of chest pain/shortness of breath, near-syncope, syncope, light headedness sustained palpitations or any other cardiovascular symptoms before their scheduled follow-up appointment.  Office number was provided #[phone].      Thank you for your consultation.  If you have any question please call me at [phone]    Counseling :  A description of the counseling.  Goals and Barriers.  Patient's ability to self care: Yes  Medication side effect reviewed with patient in detail and all their questions answered to their satisfaction.      Primary Care Physician Requesting Consult: Frank Lombardi,  DO    Reason for Consult / Principal Problem: Abnormal stress EKG        HPI :     Page Mejia is a 78 y.o. year old female who was referred by primary care doctor for abnormal stress EKG.  Patient was a medical history significant for hypertensive heart disease, dyslipidemia, frequent PVCs on stress EKG along with EKG changes was sent to us regarding abnormal stress test.  She had a nuclear stress test done in  which shows frequent PVCs and PSVT episode however no perfusion abnormality was noted.  She has blood test done in 2024 which shows her electrolytes are stable cholesterol profile borderline acceptable.  Her hemoglobin is stable.  Her medication list reviewed.    There is no family history of premature coronary artery disease she personally has no family history of any heart stents or heart failure.    She has no recent surgery her previous surgery was .  She lives with her kids.  She does not smoke does not drink.  And she walks 3 to 4 miles a day without any issues.    Review of Systems   Constitutional:  Negative for activity change, chills, diaphoresis, fever and unexpected weight change.   HENT:  Negative for congestion.    Eyes:  Negative for discharge and redness.   Respiratory:  Negative for cough, chest tightness, shortness of breath and wheezing.    Cardiovascular: Negative.  Negative for chest pain, palpitations and leg swelling.   Gastrointestinal:  Negative for abdominal pain, diarrhea and nausea.   Endocrine: Negative.    Genitourinary:  Negative for decreased urine volume and urgency.   Musculoskeletal: Negative.  Negative for arthralgias, back pain and gait problem.   Skin:  Negative for rash and wound.   Allergic/Immunologic: Negative.    Neurological:  Negative for dizziness, seizures, syncope, weakness, light-headedness and headaches.   Hematological: Negative.    Psychiatric/Behavioral:  Negative for agitation and confusion. The patient is not nervous/anxious.         Historical Information   Past Medical History:   Diagnosis Date   • Arthritis     right knee, leg   • Colon polyp    • Disease of thyroid gland     hypo   • Leukocytosis     last assessed: 10/21/2016   • Wears glasses      Past Surgical History:   Procedure Laterality Date   •  SECTION      5 c sec   • COLONOSCOPY N/A 2016    Procedure: COLONOSCOPY;  Surgeon: Luan Mccormack MD;  Location: [facility] GI LAB;  Service:    • ID XCAPSL CTRC RMVL INSJ IO LENS PROSTH W/O ECP Right 2023    Procedure: EXTRACTION EXTRACAPSULAR CATARACT PHACO INTRAOCULAR LENS (IOL);  Surgeon: Pete Manzanares MD;  Location: [facility] MAIN OR;  Service: Ophthalmology   • ID XCAPSL CTRC RMVL INSJ IO LENS PROSTH W/O ECP Left 3/13/2023    Procedure: EXTRACTION EXTRACAPSULAR CATARACT PHACO INTRAOCULAR LENS (IOL);  Surgeon: Pete Manzanares MD;  Location: [facility] MAIN OR;  Service: Ophthalmology     Social History     Substance and Sexual Activity   Alcohol Use Yes    Comment: rare     Social History     Substance and Sexual Activity   Drug Use Never     Social History     Tobacco Use   Smoking Status Never   • Passive exposure: Never   Smokeless Tobacco Never     Family History:   Family History   Problem Relation Age of Onset   • Arthritis Mother         Knee   • Hypertension Mother    • Heart disease Mother         Pacemaker Placement   • Hypertension Father    • Heart disease Brother    • No Known Problems Sister    • No Known Problems Daughter    • No Known Problems Sister    • No Known Problems Daughter    • No Known Problems Daughter    • No Known Problems Daughter    • Mental illness Neg Hx        Meds/Allergies     No Known Allergies    Current Outpatient Medications:   •  amLODIPine (NORVASC) 5 mg tablet, Take 1 tablet (5 mg total) by mouth daily, Disp: 90 tablet, Rfl: 1  •  aspirin (ECOTRIN LOW STRENGTH) 81 mg EC tablet, Take 81 mg by mouth daily, Disp: , Rfl:   •  Calcium Carbonate-Vitamin D (CALCIUM 600+D PO), Take by mouth  "daily with lunch, Disp: , Rfl:   •  cholecalciferol (VITAMIN D3) 1,000 units tablet, Take 1,000 Units by mouth daily, Disp: , Rfl:   •  hydroCHLOROthiazide 12.5 mg tablet, take 1 tablet by mouth once daily, Disp: 100 tablet, Rfl: 1  •  levothyroxine 25 mcg tablet, take 1 tablet by mouth once daily, Disp: 100 tablet, Rfl: 1  •  Omega-3 Fatty Acids (fish oil) 1,000 mg, Take 1,000 mg by mouth daily Last dose 5/1/21, Disp: , Rfl:   •  rosuvastatin (CRESTOR) 5 mg tablet, Take 1 tablet (5 mg total) by mouth daily, Disp: 90 tablet, Rfl: 3  •  triamcinolone (KENALOG) 0.1 % cream, Apply 1 application topically 2 (two) times a day To affected area, Disp: , Rfl:   •  meloxicam (Mobic) 15 mg tablet, Take 1 tablet (15 mg total) by mouth daily (Patient not taking: Reported on 8/5/2024), Disp: 90 tablet, Rfl: 0    Vitals: Blood pressure 148/80, pulse 59, height 5' 5\" (1.651 m), weight 72.1 kg (159 lb), SpO2 99%.    Body mass index is 26.46 kg/m².  Wt Readings from Last 3 Encounters:   08/05/24 72.1 kg (159 lb)   07/10/24 72.3 kg (159 lb 6.4 oz)   06/10/24 70.3 kg (155 lb)     Vitals:    08/05/24 1008   Weight: 72.1 kg (159 lb)     BP Readings from Last 3 Encounters:   08/05/24 148/80   07/10/24 137/70   06/10/24 156/80         Physical Exam    Neurologic:  Alert & oriented x 3, no new focal deficits, Not in any acute distress,  Constitutional:  Adequate built, non-toxic appearance   Neck: Normal range of motion, no tenderness,  Neck supple   Respiratory:  Bilateral air entry, mostly clear to auscultation  Cardiovascular: S1-S2 regular with a 2/6 ejection systolic murmur and S4 is present  GI:  Soft, nondistended,  nontender, no hepatosplenomegaly appreciated.  Musculoskeletal:  No edema, no tenderness, no deformities.   Skin:  Well hydrated, no rash   Extremities:  No edema and distal pulses are present  Psychiatric:  Speech and behavior appropriate     Diagnostic Studies Review Cardio:    Stress EKG test in April 2024 shows " patient has 1.4 mm ST depression in lead II, III, aVF, V4 and V5.  Patient exercised 4 minutes and 26 seconds hypertensive response to exercise was noted.    Nuclear stress test done in 2024 shows patient had EKG changes episode of PSVT and frequent PVCs however no perfusion abnormality was noted EF of 63%.      EKG:    Twelve-lead EKG 2024 sinus rhythm heart rate 59 bpm minute but devoid tectorial for LVH heart rate is 59 bpm.    Cardiac testing:   Results for orders placed during the hospital encounter of 19    Echo complete with contrast if indicated    Narrative  [address] [id number] [phone]    Transthoracic Echocardiogram  2D, M-mode, Doppler, and Color Doppler    Study date:  2019    Patient: ANGELA PIRES  MR number: [id number]  Account number: [id number]  : 1945  Age: 73 years  Gender: Female  Status: Outpatient  Location: Echo lab  Height: 64 in  Weight: 163.7 lb  BP: 145/ 83 mmHg    Indications: murmur    Diagnoses: R01.1 - Cardiac murmur, unspecified    Sonographer:  CHANDU Shaffer  Primary Physician:  Frank T. Lombardi, DO  Referring Physician:  VAIBHAV Kinsey  Group:  [facility]  Interpreting Physician:  Gilberto Nicolas MD    SUMMARY    LEFT VENTRICLE:  Systolic function was normal. Ejection fraction was estimated in the range of 55 % to 60 % to be 60 %.  There were no regional wall motion abnormalities.    RIGHT ATRIUM:  The atrium was mildly dilated.    MITRAL VALVE:  There was trace regurgitation.    AORTIC VALVE:  There was mild regurgitation.    TRICUSPID VALVE:  There was mild regurgitation.    HISTORY: PRIOR HISTORY: Leukocytosis Risk factors: a family history of coronary artery disease.    PROCEDURE: The procedure was performed in the echo lab. This was a routine study. The transthoracic approach was used. The study included complete 2D imaging, M-mode, complete  spectral Doppler, and color Doppler. The heart rate was 45 bpm,  at the start of the study. Images were obtained from the parasternal, apical, subcostal, and suprasternal notch acoustic windows. Image quality was adequate.    LEFT VENTRICLE: Size was normal. Systolic function was normal. Ejection fraction was estimated in the range of 55 % to 60 % to be 60 %. There were no regional wall motion abnormalities. Wall thickness was normal. No evidence of apical  thrombus. DOPPLER: Left ventricular diastolic function parameters were normal.    RIGHT VENTRICLE: The size was normal. Systolic function was normal. Wall thickness was normal.    LEFT ATRIUM: Size was normal.    RIGHT ATRIUM: The atrium was mildly dilated.    MITRAL VALVE: There was mild thickening. There was normal leaflet separation. DOPPLER: The transmitral velocity was within the normal range. There was no evidence for stenosis. There was trace regurgitation.    AORTIC VALVE: The valve was trileaflet. Leaflets exhibited mildly increased thickness and normal cuspal separation. DOPPLER: Transaortic velocity was within the normal range. There was no evidence for stenosis. There was mild  regurgitation.    TRICUSPID VALVE: The valve structure was normal. There was normal leaflet separation. DOPPLER: The transtricuspid velocity was within the normal range. There was no evidence for stenosis. There was mild regurgitation. Estimated peak PA  pressure was 40 mmHg. The findings suggest mild pulmonary hypertension.    PULMONIC VALVE: Leaflets exhibited normal thickness, no calcification, and normal cuspal separation. DOPPLER: The transpulmonic velocity was within the normal range. There was no significant regurgitation.    PERICARDIUM: There was no pericardial effusion. The pericardium was normal in appearance.    AORTA: The root exhibited normal size.    SYSTEMIC VEINS: IVC: The inferior vena cava was normal in size.    SYSTEM MEASUREMENT TABLES    2D mode  AoR Diam  2D: 2.6 cm  LA Diam (2D): 3.2 cm  LA/Ao (2D): 1.23  FS (2D Teich): 27.5 %  IVSd (2D): 0.89 cm  LVDEV: 97.8 cmï¾³  LVESV: 45.4 cmï¾³  LVIDd(2D): 4.61 cm  LVISd (2D): 3.34 cm  LVOT Area 2D: 3.14 cmï¾²  LVPWd (2D): 0.88 cm  SV (Teich): 52.4 cmï¾³    Apical four chamber  LVEDV MOD A4C: 125 cmï¾³  LVEF A4C: 54 %  LVLD A4C: 7.95 cm    Unspecified Scan Mode  MICHEAL Cont Eq (Peak Alvarado): 2.44 cmï¾²  MICHEAL Cont Eq (VTI): 2.68 cmï¾²  Dec. Bath; Regurgitant Flow: 1410 mm/s2  Max P mm[Hg]  V Max: 4480 mm/s  Vmax: 4990 mm/s  LVOT (VTI): 27.5 cm  LVOT Diam.: 2 cm  LVOT Vmax: 1200 mm/s  LVOT Vmax; Mean: 1230 mm/s  Peak Grad.; Mean: 6 mm[Hg]  SV (LVOT): 89 cmï¾³  VTI;Mean: 2 mm[Hg]  MV Peak A Alvarado: 750 mm/s  MV Peak E Alvarado. Mean: 624 mm/s  MVA (PHT): 3.19 cmï¾²  PHT: 63 ms  Max P mm[Hg]  V Max: 2880 mm/s  Vmax: 2860 mm/s  RA Area: 19.5 cmï¾²  RA Volume: 54.3 cmï¾³  TAPSE: 2.4 cm    [employer] Accredited Echocardiography Laboratory    Prepared and electronically signed by    Gilberto Nicolas MD  Signed 2019 20:03:28        Results for orders placed during the hospital encounter of 06/10/24    NM myocardial perfusion spect (stress and/or rest)    Interpretation Summary  •  Stress Combined Conclusion: The ECG and SPECT imaging portions of the stress study are discordant.  Clinical correlation recommended  •  The stress ECG is consistent with ischemia after maximal exercise, without experiencing chest pain.  Patient had frequent ventricular bigeminy at beginning of exercise and 6 beat episode of PSVT during recovery.  •  Perfusion: Comparison of post stress and prone images with the resting revealed no significant reversible ischemia.  •  Stress Function: Left ventricular function post-stress is normal.  Calculated LV ejection fraction is 63%.  •  Perfusion Defect Conclusion: There is no evidence of transient ischemic dilation (TID).    No results found for this or any previous visit.    No results  "found.    Imaging:  Chest X-Ray:   No Chest XR results available for this patient.    CT-scan of the chest:     No CTA results available for this patient.  Lab Review   Lab Results   Component Value Date    WBC 3.73 (L) 03/29/2024    HGB 13.6 03/29/2024    HCT 40.0 03/29/2024    MCV 86 03/29/2024    RDW 13.9 03/29/2024     03/29/2024     BMP:  Lab Results   Component Value Date    SODIUM 139 03/29/2024    K 4.4 03/29/2024     03/29/2024    CO2 33 (H) 03/29/2024    BUN 13 03/29/2024    CREATININE 0.95 03/29/2024    GLUC 153 (H) 06/01/2019    GLUF 91 03/29/2024    CALCIUM 10.2 03/29/2024    EGFR 58 03/29/2024     Troponins:    LFT:  Lab Results   Component Value Date    AST 17 03/29/2024    ALT 13 03/29/2024    ALKPHOS 55 03/29/2024    TP 7.6 03/29/2024    ALB 4.4 03/29/2024      Lab Results   Component Value Date    CPD8BYKRUSOS 1.005 03/29/2024     No components found for: \"TSH3\"  No results found for: \"HGBA1C\"  Lipid Profile:   Lab Results   Component Value Date    CHOLESTEROL 189 03/29/2024    HDL 74 03/29/2024    LDLCALC 101 (H) 03/29/2024    TRIG 69 03/29/2024     Lab Results   Component Value Date    CHOLESTEROL 189 03/29/2024    CHOLESTEROL 176 11/05/2021     Lab Results   Component Value Date    TROPONINI <0.02 06/01/2019    TROPONINI <0.02 06/01/2019     No results found for: \"NTBNP\"   No results found for this or any previous visit (from the past 672 hour(s)).        Dr. Theresa Chand MD [facility]      \"This note was completed in part utilizing m-modal fluency direct voice recognition software.   Grammatical errors, random word insertion, spelling mistakes, and incomplete sentences may be an occasional consequence of the system secondary to software limitations, ambient noise and hardware issues.    Please read the chart carefully and recognize, using context, where substitutions have occurred.  If you have any questions or concerns about the context, text or information contained within the body " "of this dictation, please contact myself, the provider, for further clarification.\"  "

## 2024-10-07 ENCOUNTER — TELEPHONE (OUTPATIENT)
Dept: GASTROENTEROLOGY | Facility: CLINIC | Age: 79
End: 2024-10-07

## 2024-10-07 NOTE — TELEPHONE ENCOUNTER
Spoke with pt confirming procedure for 10/16 with Dr. Mccormack. She has her  and prep instructions. She will be called day prior with arrival time. I will call her after I hear from Dr. Chand if she is cleared for procedure after having ECHO on 10/9.

## 2024-10-09 ENCOUNTER — NURSE TRIAGE (OUTPATIENT)
Age: 79
End: 2024-10-09

## 2024-10-09 ENCOUNTER — TELEPHONE (OUTPATIENT)
Dept: CARDIOLOGY CLINIC | Facility: CLINIC | Age: 79
End: 2024-10-09

## 2024-10-09 ENCOUNTER — HOSPITAL ENCOUNTER (OUTPATIENT)
Dept: NON INVASIVE DIAGNOSTICS | Facility: HOSPITAL | Age: 79
Discharge: HOME | End: 2024-10-09
Attending: INTERNAL MEDICINE
Payer: COMMERCIAL

## 2024-10-09 VITALS
WEIGHT: 159 LBS | HEIGHT: 65 IN | DIASTOLIC BLOOD PRESSURE: 79 MMHG | SYSTOLIC BLOOD PRESSURE: 165 MMHG | HEART RATE: 54 BPM | BODY MASS INDEX: 26.49 KG/M2

## 2024-10-09 DIAGNOSIS — E03.9 ACQUIRED HYPOTHYROIDISM: ICD-10-CM

## 2024-10-09 DIAGNOSIS — R01.1 CARDIAC MURMUR: ICD-10-CM

## 2024-10-09 DIAGNOSIS — R94.39 ABNORMAL STRESS ECG: ICD-10-CM

## 2024-10-09 DIAGNOSIS — I10 BENIGN ESSENTIAL HYPERTENSION: ICD-10-CM

## 2024-10-09 DIAGNOSIS — E78.5 DYSLIPIDEMIA: ICD-10-CM

## 2024-10-09 DIAGNOSIS — I11.9 HYPERTENSIVE HEART DISEASE WITHOUT HEART FAILURE: ICD-10-CM

## 2024-10-09 DIAGNOSIS — I27.20 PULMONARY HYPERTENSION (HCC): ICD-10-CM

## 2024-10-09 LAB
AORTIC ROOT: 2.7 CM
APICAL FOUR CHAMBER EJECTION FRACTION: 66 %
AV LVOT PEAK GRADIENT: 10 MMHG
BSA FOR ECHO PROCEDURE: 1.79 M2
DOP CALC LVOT AREA: 2.83 CM2
DOP CALC LVOT DIAMETER: 1.9 CM
E WAVE DECELERATION TIME: 91 MS
E/A RATIO: 1.03
FRACTIONAL SHORTENING: 44 (ref 28–44)
INTERVENTRICULAR SEPTUM IN DIASTOLE (PARASTERNAL SHORT AXIS VIEW): 1.2 CM
INTERVENTRICULAR SEPTUM: 1.2 CM (ref 0.6–1.1)
LAAS-AP2: 18.9 CM2
LAAS-AP4: 11.4 CM2
LEFT ATRIUM AREA SYSTOLE SINGLE PLANE A4C: 11.1 CM2
LEFT ATRIUM SIZE: 3.2 CM
LEFT ATRIUM VOLUME (MOD BIPLANE): 40 ML
LEFT ATRIUM VOLUME INDEX (MOD BIPLANE): 22.3 ML/M2
LEFT INTERNAL DIMENSION IN SYSTOLE: 2.4 CM (ref 2.1–4)
LEFT VENTRICULAR INTERNAL DIMENSION IN DIASTOLE: 4.3 CM (ref 3.5–6)
LEFT VENTRICULAR POSTERIOR WALL IN END DIASTOLE: 0.9 CM
LEFT VENTRICULAR STROKE VOLUME: 62 ML
LVSV (TEICH): 62 ML
MV E'TISSUE VEL-LAT: 7 CM/S
MV E'TISSUE VEL-SEP: 7 CM/S
MV PEAK A VEL: 0.66 M/S
MV PEAK E VEL: 68 CM/S
MV STENOSIS PRESSURE HALF TIME: 26 MS
MV VALVE AREA P 1/2 METHOD: 8.46
RA PRESSURE ESTIMATED: 8 MMHG
RIGHT ATRIUM AREA SYSTOLE A4C: 17.2 CM2
RIGHT VENTRICLE ID DIMENSION: 3.5 CM
RV PSP: 34 MMHG
SL CV LEFT ATRIUM LENGTH A2C: 5 CM
SL CV LV EF: 60
SL CV PED ECHO LEFT VENTRICLE DIASTOLIC VOLUME (MOD BIPLANE) 2D: 82 ML
SL CV PED ECHO LEFT VENTRICLE SYSTOLIC VOLUME (MOD BIPLANE) 2D: 20 ML
TR MAX PG: 26 MMHG
TR PEAK VELOCITY: 2.6 M/S
TRICUSPID ANNULAR PLANE SYSTOLIC EXCURSION: 2.3 CM
TRICUSPID VALVE PEAK REGURGITATION VELOCITY: 2.57 M/S

## 2024-10-09 PROCEDURE — 93306 TTE W/DOPPLER COMPLETE: CPT | Performed by: INTERNAL MEDICINE

## 2024-10-09 PROCEDURE — 93306 TTE W/DOPPLER COMPLETE: CPT

## 2024-10-09 NOTE — TELEPHONE ENCOUNTER
"Patient called into clinical line stating she received a letter that a test was approved and physician is Dr. Mccormack and she isn't sure what it is for. I reviewed that per notes in referrals her upcoming colonoscopy was authorized and that is most likely a letter updating her from her insurance plan.  I did review colon is currently scheduled for 10/16/24 dependent on cardiology clearance at this time per notes.      Answer Assessment - Initial Assessment Questions  1. REASON FOR CALL or QUESTION: \"What is your reason for calling today?\" or \"How can I best help you?\" or \"What question do you have that I can help answer?\"      Patient called in regarding letter she received.    Protocols used: Information Only Call - No Triage-ADULT-[place]    "

## 2024-10-09 NOTE — TELEPHONE ENCOUNTER
----- Message from Theresa Chand MD sent at 10/9/2024  3:14 PM EDT -----  Patient's echo shows normal LV systolic function.  No significant, to mild valvular disease.  Patient can keep an appointment.    Please call patient about echo report.

## 2024-10-21 ENCOUNTER — ANESTHESIA (OUTPATIENT)
Dept: ANESTHESIOLOGY | Facility: HOSPITAL | Age: 79
End: 2024-10-21

## 2024-10-21 ENCOUNTER — ANESTHESIA EVENT (OUTPATIENT)
Dept: ANESTHESIOLOGY | Facility: HOSPITAL | Age: 79
End: 2024-10-21

## 2024-10-28 ENCOUNTER — TELEPHONE (OUTPATIENT)
Dept: GASTROENTEROLOGY | Facility: CLINIC | Age: 79
End: 2024-10-28

## 2024-10-28 NOTE — TELEPHONE ENCOUNTER
Spoke with pt confirming procedure for 11/4 with Dr. Mccormack. She has her instructions and  arranged. She will be called Friday with her arrival time.

## 2024-11-03 RX ORDER — SODIUM CHLORIDE, SODIUM LACTATE, POTASSIUM CHLORIDE, CALCIUM CHLORIDE 600; 310; 30; 20 MG/100ML; MG/100ML; MG/100ML; MG/100ML
75 INJECTION, SOLUTION INTRAVENOUS CONTINUOUS
Status: CANCELLED | OUTPATIENT
Start: 2024-11-03

## 2024-11-04 ENCOUNTER — ANESTHESIA (OUTPATIENT)
Dept: GASTROENTEROLOGY | Facility: AMBULARY SURGERY CENTER | Age: 79
End: 2024-11-04
Payer: COMMERCIAL

## 2024-11-04 ENCOUNTER — HOSPITAL ENCOUNTER (OUTPATIENT)
Dept: GASTROENTEROLOGY | Facility: AMBULARY SURGERY CENTER | Age: 79
Setting detail: OUTPATIENT SURGERY
Discharge: HOME | End: 2024-11-04
Attending: INTERNAL MEDICINE
Payer: COMMERCIAL

## 2024-11-04 VITALS
TEMPERATURE: 96.6 F | HEART RATE: 48 BPM | WEIGHT: 158.95 LBS | DIASTOLIC BLOOD PRESSURE: 67 MMHG | RESPIRATION RATE: 18 BRPM | HEIGHT: 65 IN | OXYGEN SATURATION: 98 % | SYSTOLIC BLOOD PRESSURE: 138 MMHG | BODY MASS INDEX: 26.48 KG/M2

## 2024-11-04 DIAGNOSIS — Z86.0100 HISTORY OF COLON POLYPS: ICD-10-CM

## 2024-11-04 PROBLEM — E78.5 HYPERLIPIDEMIA: Status: ACTIVE | Noted: 2024-11-04

## 2024-11-04 PROCEDURE — 45385 COLONOSCOPY W/LESION REMOVAL: CPT | Performed by: INTERNAL MEDICINE

## 2024-11-04 PROCEDURE — 88305 TISSUE EXAM BY PATHOLOGIST: CPT | Performed by: PATHOLOGY

## 2024-11-04 PROCEDURE — 45380 COLONOSCOPY AND BIOPSY: CPT | Performed by: INTERNAL MEDICINE

## 2024-11-04 RX ORDER — LIDOCAINE HYDROCHLORIDE 10 MG/ML
INJECTION, SOLUTION EPIDURAL; INFILTRATION; INTRACAUDAL; PERINEURAL AS NEEDED
Status: DISCONTINUED | OUTPATIENT
Start: 2024-11-04 | End: 2024-11-04

## 2024-11-04 RX ORDER — PROPOFOL 10 MG/ML
INJECTION, EMULSION INTRAVENOUS CONTINUOUS PRN
Status: DISCONTINUED | OUTPATIENT
Start: 2024-11-04 | End: 2024-11-04

## 2024-11-04 RX ORDER — SODIUM CHLORIDE, SODIUM LACTATE, POTASSIUM CHLORIDE, CALCIUM CHLORIDE 600; 310; 30; 20 MG/100ML; MG/100ML; MG/100ML; MG/100ML
75 INJECTION, SOLUTION INTRAVENOUS CONTINUOUS
Status: DISCONTINUED | OUTPATIENT
Start: 2024-11-04 | End: 2024-11-08 | Stop reason: HOSPADM

## 2024-11-04 RX ORDER — PROPOFOL 10 MG/ML
INJECTION, EMULSION INTRAVENOUS AS NEEDED
Status: DISCONTINUED | OUTPATIENT
Start: 2024-11-04 | End: 2024-11-04

## 2024-11-04 RX ORDER — SODIUM CHLORIDE, SODIUM LACTATE, POTASSIUM CHLORIDE, CALCIUM CHLORIDE 600; 310; 30; 20 MG/100ML; MG/100ML; MG/100ML; MG/100ML
INJECTION, SOLUTION INTRAVENOUS CONTINUOUS PRN
Status: DISCONTINUED | OUTPATIENT
Start: 2024-11-04 | End: 2024-11-04

## 2024-11-04 RX ADMIN — LIDOCAINE HYDROCHLORIDE 50 MG: 10 INJECTION, SOLUTION EPIDURAL; INFILTRATION; INTRACAUDAL; PERINEURAL at 08:09

## 2024-11-04 RX ADMIN — PROPOFOL 100 MCG/KG/MIN: 10 INJECTION, EMULSION INTRAVENOUS at 08:08

## 2024-11-04 RX ADMIN — SODIUM CHLORIDE, SODIUM LACTATE, POTASSIUM CHLORIDE, AND CALCIUM CHLORIDE 75 ML/HR: .6; .31; .03; .02 INJECTION, SOLUTION INTRAVENOUS at 07:37

## 2024-11-04 RX ADMIN — PROPOFOL 100 MG: 10 INJECTION, EMULSION INTRAVENOUS at 08:09

## 2024-11-04 RX ADMIN — SODIUM CHLORIDE, SODIUM LACTATE, POTASSIUM CHLORIDE, AND CALCIUM CHLORIDE: .6; .31; .03; .02 INJECTION, SOLUTION INTRAVENOUS at 08:01

## 2024-11-04 NOTE — H&P
History and Physical -  [facility]  Page Mejia 78 y.o. female MRN: [id number]    HPI: Page Mejia is a 78 y.o. year old female who presents with hx of colon polyps      Review of Systems    Historical Information   Past Medical History:   Diagnosis Date    Arthritis     right knee, leg    Colon polyp     Disease of thyroid gland     hypo    Leukocytosis     last assessed: 10/21/2016    Wears glasses      Past Surgical History:   Procedure Laterality Date     SECTION      5 c sec    COLONOSCOPY N/A 2016    Procedure: COLONOSCOPY;  Surgeon: Luan Mccormack MD;  Location: [facility] GI LAB;  Service:     DC XCAPSL CTRC RMVL INSJ IO LENS PROSTH W/O ECP Right 2023    Procedure: EXTRACTION EXTRACAPSULAR CATARACT PHACO INTRAOCULAR LENS (IOL);  Surgeon: Pete Manzanares MD;  Location: [facility] MAIN OR;  Service: Ophthalmology    DC XCAPSL CTRC RMVL INSJ IO LENS PROSTH W/O ECP Left 3/13/2023    Procedure: EXTRACTION EXTRACAPSULAR CATARACT PHACO INTRAOCULAR LENS (IOL);  Surgeon: Pete Manzanares MD;  Location: [facility] MAIN OR;  Service: Ophthalmology     Social History   Social History     Substance and Sexual Activity   Alcohol Use Yes    Comment: rare     Social History     Substance and Sexual Activity   Drug Use Never     Social History     Tobacco Use   Smoking Status Never    Passive exposure: Never   Smokeless Tobacco Never     Family History   Problem Relation Age of Onset    Arthritis Mother         Knee    Hypertension Mother     Heart disease Mother         Pacemaker Placement    Hypertension Father     Heart disease Brother     No Known Problems Sister     No Known Problems Daughter     No Known Problems Sister     No Known Problems Daughter     No Known Problems Daughter     No Known Problems Daughter     Mental illness Neg Hx        Meds/Allergies     Not in a hospital admission.    No Known Allergies    Objective     /72   Pulse 62   Temp (!) 96.6 °F (35.9 °C) (Temporal)    "Resp 18   Ht 5' 5\" (1.651 m)   Wt 72.1 kg (158 lb 15.2 oz)   SpO2 99%   BMI 26.45 kg/m²       PHYSICAL EXAM    Gen: NAD  CV: RRR  CHEST: Clear  ABD: soft, NT/ND  EXT: no edema  Neuro: AAO      ASSESSMENT/PLAN:  This is a 78 y.o. year old female here for  hx of colon polyps    PLAN:   Procedure: colonoscopy      "

## 2024-11-04 NOTE — ANESTHESIA POSTPROCEDURE EVALUATION
Post-Op Assessment Note    CV Status:  Stable  Pain Score: 0    Pain management: adequate       Mental Status:  Awake   Hydration Status:  Stable   PONV Controlled:  None   Airway Patency:  Patent  Two or more mitigation strategies used for obstructive sleep apnea   Post Op Vitals Reviewed: Yes    No anethesia notable event occurred.    Staff: CRNA           Last Filed PACU Vitals:  Vitals Value Taken Time   Temp     Pulse 55    /64    Resp 12    SpO2 99        Modified Omar:  Activity: 2 (11/4/2024  7:21 AM)  Respiration: 2 (11/4/2024  7:21 AM)  Circulation: 2 (11/4/2024  7:21 AM)  Consciousness: 2 (11/4/2024  7:21 AM)  Oxygen Saturation: 2 (11/4/2024  7:21 AM)  Modified Omar Score: 10 (11/4/2024  7:21 AM)

## 2024-11-04 NOTE — ANESTHESIA PREPROCEDURE EVALUATION
Procedure:  COLONOSCOPY    Relevant Problems   CARDIO   (+) Benign essential hypertension   (+) Hyperlipidemia   (+) Murmur      ENDO   (+) Acquired hypothyroidism      MUSCULOSKELETAL   (+) Primary osteoarthritis of right knee   (+) Primary osteoarthritis of right shoulder        Physical Exam    Airway    Mallampati score: II  TM Distance: >3 FB  Neck ROM: full     Dental       Cardiovascular  Rhythm: regular, Rate: normal    Pulmonary   Breath sounds clear to auscultation    Other Findings  post-pubertal.      Anesthesia Plan  ASA Score- 2     Anesthesia Type- IV sedation with anesthesia with ASA Monitors.         Additional Monitors:     Airway Plan:            Plan Factors-    Chart reviewed.        Patient is not a current smoker.              Induction- intravenous.    Postoperative Plan-     Perioperative Resuscitation Plan - Level 1 - Full Code.       Informed Consent- Anesthetic plan and risks discussed with patient.  I personally reviewed this patient with the CRNA. Discussed and agreed on the Anesthesia Plan with the CRNA..

## 2024-11-05 PROCEDURE — 88305 TISSUE EXAM BY PATHOLOGIST: CPT | Performed by: PATHOLOGY

## 2024-11-14 ENCOUNTER — VBI (OUTPATIENT)
Dept: ADMINISTRATIVE | Facility: OTHER | Age: 79
End: 2024-11-14

## 2024-11-14 NOTE — TELEPHONE ENCOUNTER
11/14/24 6:53 AM     Chart reviewed for BP ; nothing is submitted to the patient's insurance at this time.     Murray Jean MA   [facility]

## 2024-11-26 ENCOUNTER — OFFICE VISIT (OUTPATIENT)
Dept: CARDIOLOGY CLINIC | Facility: CLINIC | Age: 79
End: 2024-11-26
Payer: COMMERCIAL

## 2024-11-26 VITALS
OXYGEN SATURATION: 99 % | WEIGHT: 160 LBS | SYSTOLIC BLOOD PRESSURE: 124 MMHG | DIASTOLIC BLOOD PRESSURE: 80 MMHG | HEART RATE: 47 BPM | BODY MASS INDEX: 26.63 KG/M2

## 2024-11-26 DIAGNOSIS — I27.20 PULMONARY HYPERTENSION (HCC): ICD-10-CM

## 2024-11-26 DIAGNOSIS — R01.1 MURMUR: ICD-10-CM

## 2024-11-26 DIAGNOSIS — R00.1 BRADYCARDIA BY ELECTROCARDIOGRAM: ICD-10-CM

## 2024-11-26 DIAGNOSIS — I10 BENIGN ESSENTIAL HYPERTENSION: Primary | ICD-10-CM

## 2024-11-26 DIAGNOSIS — E78.00 ELEVATED LDL CHOLESTEROL LEVEL: ICD-10-CM

## 2024-11-26 PROCEDURE — 99212 OFFICE O/P EST SF 10 MIN: CPT | Performed by: INTERNAL MEDICINE

## 2024-11-26 PROCEDURE — 93000 ELECTROCARDIOGRAM COMPLETE: CPT | Performed by: INTERNAL MEDICINE

## 2024-11-26 RX ORDER — VIT C/B6/B5/MAGNESIUM/HERB 173 50-5-6-5MG
CAPSULE ORAL
COMMUNITY

## 2024-11-26 NOTE — PROGRESS NOTES
[facility]  [address] [facility] [place]  [address]  [facility] [address]      Page Mejia  1945  [id number]    Assessment & Plan  Benign essential hypertension  Hypertension Controlled on Amlodipine and HCTZ  Compliant with medications    Pulmonary hypertension (HCC)  PAP normal by echo. No c/o sob.  Murmur  Tyler no change  Elevated LDL cholesterol level  LDL 101mg/dl on Rouvastatin 5mg Lipid panel pending   Bradycardia by electrocardiogram  Sinus Bradycardia rate 47 bpm TSH normal        Page Mejia is a 78 y.o. female who presents for f/u with hypertension and hypercholesterolemia. She had an echo 10/9/24 with Mild TR and MR LVEF 60%. PAP nl.   Myocardial Perfusion Stress Test 96% MPHR 6 min Kiran with no ischemia by myocardial perfusion imaging. She reports no chest pain or shortness of breath on today's visit.      Continue all medications. Previous studies reviewed with patient, medications reviewed and possible side effects discussed. Continue risk factor modification. Optimize weight, regular exercise and follow up with appropriate specialists and primary care physician as discussed.  All questions answered. Patient advised to report any problems prompting to medical attention. Return for follow up visit in  6 months or earlier if needed    Chief Complaint   Patient presents with   • Establish Care         Objective           PMH:     Patient Active Problem List   Diagnosis   • Abnormal mammogram of right breast   • Dense breast tissue   • Murmur   • Chronic idiopathic neutropenia (HCC)   • Acquired hypothyroidism   • Primary osteoarthritis of right knee   • Primary osteoarthritis of right shoulder   • Pulmonary hypertension (HCC)   • Arthralgia   • External hemorrhoids   • Benign essential hypertension   • Osteopenia of femoral neck, bilateral   • History of colon polyps   • Elevated LDL cholesterol level   • Hyperlipidemia     Past Medical History:   Diagnosis Date   • Arthritis      right knee, leg   • Colon polyp    • Disease of thyroid gland     hypo   • Leukocytosis     last assessed: 10/21/2016   • Wears glasses      Social History     Socioeconomic History   • Marital status: /Civil Union     Spouse name: Not on file   • Number of children: Not on file   • Years of education: Not on file   • Highest education level: Not on file   Occupational History   • Not on file   Tobacco Use   • Smoking status: Never     Passive exposure: Never   • Smokeless tobacco: Never   Vaping Use   • Vaping status: Never Used   Substance and Sexual Activity   • Alcohol use: Yes     Comment: rare   • Drug use: Never   • Sexual activity: Not Currently     Birth control/protection: Post-menopausal   Other Topics Concern   • Not on file   Social History Narrative   • Not on file     Social Drivers of Health     Financial Resource Strain: Low Risk  (2/15/2024)    Overall Financial Resource Strain (CARDIA)    • Difficulty of Paying Living Expenses: Not hard at all   Food Insecurity: Not on file   Transportation Needs: No Transportation Needs (2/15/2024)    PRAPARE - Transportation    • Lack of Transportation (Medical): No    • Lack of Transportation (Non-Medical): No   Physical Activity: Not on file   Stress: Not on file   Social Connections: Not on file   Intimate Partner Violence: Not on file   Housing Stability: Not on file      Family History   Problem Relation Age of Onset   • Arthritis Mother         Knee   • Hypertension Mother    • Heart disease Mother         Pacemaker Placement   • Hypertension Father    • Heart disease Brother    • No Known Problems Sister    • No Known Problems Daughter    • No Known Problems Sister    • No Known Problems Daughter    • No Known Problems Daughter    • No Known Problems Daughter    • Mental illness Neg Hx      Past Surgical History:   Procedure Laterality Date   •  SECTION      5 c sec   • COLONOSCOPY N/A 2016    Procedure: COLONOSCOPY;  Surgeon: Luan  MD Easton;  Location: [facility] GI LAB;  Service:    • LA XCAPSL CTRC RMVL INSJ IO LENS PROSTH W/O ECP Right 2/6/2023    Procedure: EXTRACTION EXTRACAPSULAR CATARACT PHACO INTRAOCULAR LENS (IOL);  Surgeon: Pete Manzanares MD;  Location: [facility] MAIN OR;  Service: Ophthalmology   • LA XCAPSL CTRC RMVL INSJ IO LENS PROSTH W/O ECP Left 3/13/2023    Procedure: EXTRACTION EXTRACAPSULAR CATARACT PHACO INTRAOCULAR LENS (IOL);  Surgeon: Pete Manzanraes MD;  Location: [facility] MAIN OR;  Service: Ophthalmology       Current Outpatient Medications:   •  amLODIPine (NORVASC) 5 mg tablet, Take 1 tablet (5 mg total) by mouth daily, Disp: 90 tablet, Rfl: 1  •  aspirin (ECOTRIN LOW STRENGTH) 81 mg EC tablet, Take 81 mg by mouth daily, Disp: , Rfl:   •  Calcium Carbonate-Vitamin D (CALCIUM 600+D PO), Take by mouth daily with lunch, Disp: , Rfl:   •  cholecalciferol (VITAMIN D3) 1,000 units tablet, Take 1,000 Units by mouth daily, Disp: , Rfl:   •  hydroCHLOROthiazide 12.5 mg tablet, take 1 tablet by mouth once daily, Disp: 100 tablet, Rfl: 1  •  levothyroxine 25 mcg tablet, take 1 tablet by mouth once daily, Disp: 100 tablet, Rfl: 1  •  Omega-3 Fatty Acids (fish oil) 1,000 mg, Take 1,000 mg by mouth daily Last dose 5/1/21, Disp: , Rfl:   •  rosuvastatin (CRESTOR) 5 mg tablet, Take 1 tablet (5 mg total) by mouth daily, Disp: 90 tablet, Rfl: 3  •  triamcinolone (KENALOG) 0.1 % cream, Apply 1 application topically 2 (two) times a day To affected area, Disp: , Rfl:   •  Turmeric 500 MG CAPS, Take by mouth, Disp: , Rfl:   •  meloxicam (Mobic) 15 mg tablet, Take 1 tablet (15 mg total) by mouth daily (Patient not taking: Reported on 8/5/2024), Disp: 90 tablet, Rfl: 0  No Known Allergies      Review of Systems   Constitutional:  Negative for chills and fever.   HENT:  Negative for ear pain and sore throat.    Eyes:  Negative for pain and visual disturbance.   Respiratory:  Negative for cough and shortness of breath.    Cardiovascular:  Negative for  chest pain and palpitations.   Gastrointestinal:  Negative for abdominal pain and vomiting.   Genitourinary:  Negative for dysuria and hematuria.   Musculoskeletal:  Negative for arthralgias and back pain.   Skin:  Negative for color change and rash.   Neurological:  Negative for seizures and syncope.   All other systems reviewed and are negative.      Wt 72.6 kg (160 lb)   BMI 26.63 kg/m²     Physical Exam  Constitutional:       Appearance: Normal appearance.   HENT:      Head: Normocephalic and atraumatic.   Cardiovascular:      Rate and Rhythm: Regular rhythm. Bradycardia present.      Pulses: Normal pulses.      Heart sounds: Murmur (miguel) heard.   Pulmonary:      Effort: Pulmonary effort is normal.      Breath sounds: Normal breath sounds.   Abdominal:      General: Abdomen is flat. Bowel sounds are normal.      Palpations: Abdomen is soft.   Musculoskeletal:         General: Normal range of motion.      Cervical back: Normal range of motion and neck supple.   Skin:     General: Skin is warm and dry.   Neurological:      General: No focal deficit present.      Mental Status: She is alert and oriented to person, place, and time.   Psychiatric:         Mood and Affect: Mood normal.         Behavior: Behavior normal.

## 2024-12-03 DIAGNOSIS — E78.00 ELEVATED LDL CHOLESTEROL LEVEL: ICD-10-CM

## 2024-12-03 DIAGNOSIS — I10 BENIGN ESSENTIAL HYPERTENSION: ICD-10-CM

## 2024-12-03 DIAGNOSIS — E03.9 ACQUIRED HYPOTHYROIDISM: ICD-10-CM

## 2024-12-03 RX ORDER — AMLODIPINE BESYLATE 5 MG/1
5 TABLET ORAL DAILY
Qty: 90 TABLET | Refills: 0 | Status: CANCELLED | OUTPATIENT
Start: 2024-12-03

## 2024-12-03 NOTE — TELEPHONE ENCOUNTER
Reason for call:   [x] Refill   [] Prior Auth  [] Other:     Office:   [x] PCP/Provider -   [] Specialty/Provider -     Medication: amLODIPine (NORVASC) 5 mg tablet    Dose/Frequency:  Take 1 tablet (5 mg total) by mouth daily     Quantity: 90 tablet     Medication: hydroCHLOROthiazide 12.5 mg tablet     Dose/Frequency: take 1 tablet by mouth once daily,     Quantity: 100 tablet     Medication: levothyroxine 25 mcg tablet     Dose/Frequency: take 1 tablet by mouth once daily     Quantity: 100 tablet     Medication: rosuvastatin (CRESTOR) 5 mg tablet     Dose/Frequency:  Take 1 tablet (5 mg total) by mouth daily     Quantity:  90 tablet     Pharmacy: [id number] [address] ( [id number]     Does the patient have enough for 3 days?   [x] Yes   [] No - Send as HP to POD

## 2024-12-04 ENCOUNTER — APPOINTMENT (OUTPATIENT)
Dept: LAB | Facility: HOSPITAL | Age: 79
End: 2024-12-04
Attending: INTERNAL MEDICINE
Payer: COMMERCIAL

## 2024-12-04 ENCOUNTER — RESULTS FOLLOW-UP (OUTPATIENT)
Dept: WOUND CARE | Facility: HOSPITAL | Age: 79
End: 2024-12-04

## 2024-12-04 ENCOUNTER — TELEPHONE (OUTPATIENT)
Dept: FAMILY MEDICINE CLINIC | Facility: CLINIC | Age: 79
End: 2024-12-04

## 2024-12-04 DIAGNOSIS — E03.9 ACQUIRED HYPOTHYROIDISM: ICD-10-CM

## 2024-12-04 DIAGNOSIS — I10 BENIGN ESSENTIAL HYPERTENSION: ICD-10-CM

## 2024-12-04 DIAGNOSIS — E78.00 ELEVATED LDL CHOLESTEROL LEVEL: ICD-10-CM

## 2024-12-04 LAB
ALBUMIN SERPL BCG-MCNC: 4.5 G/DL (ref 3.5–5)
ALP SERPL-CCNC: 51 U/L (ref 34–104)
ALT SERPL W P-5'-P-CCNC: 15 U/L (ref 7–52)
ANION GAP SERPL CALCULATED.3IONS-SCNC: 6 MMOL/L (ref 4–13)
AST SERPL W P-5'-P-CCNC: 20 U/L (ref 13–39)
BILIRUB SERPL-MCNC: 1.02 MG/DL (ref 0.2–1)
BUN SERPL-MCNC: 14 MG/DL (ref 5–25)
CALCIUM SERPL-MCNC: 10.4 MG/DL (ref 8.4–10.2)
CHLORIDE SERPL-SCNC: 101 MMOL/L (ref 96–108)
CHOLEST SERPL-MCNC: 157 MG/DL (ref ?–200)
CO2 SERPL-SCNC: 33 MMOL/L (ref 21–32)
CREAT SERPL-MCNC: 0.9 MG/DL (ref 0.6–1.3)
GFR SERPL CREATININE-BSD FRML MDRD: 61 ML/MIN/1.73SQ M
GLUCOSE P FAST SERPL-MCNC: 94 MG/DL (ref 65–99)
HDLC SERPL-MCNC: 75 MG/DL
LDLC SERPL CALC-MCNC: 72 MG/DL (ref 0–100)
POTASSIUM SERPL-SCNC: 3.8 MMOL/L (ref 3.5–5.3)
PROT SERPL-MCNC: 7.8 G/DL (ref 6.4–8.4)
SODIUM SERPL-SCNC: 140 MMOL/L (ref 135–147)
TRIGL SERPL-MCNC: 48 MG/DL (ref ?–150)
TSH SERPL DL<=0.05 MIU/L-ACNC: 0.99 UIU/ML (ref 0.45–4.5)

## 2024-12-04 PROCEDURE — 84443 ASSAY THYROID STIM HORMONE: CPT

## 2024-12-04 PROCEDURE — 80053 COMPREHEN METABOLIC PANEL: CPT

## 2024-12-04 PROCEDURE — 36415 COLL VENOUS BLD VENIPUNCTURE: CPT

## 2024-12-04 PROCEDURE — 80061 LIPID PANEL: CPT

## 2024-12-04 RX ORDER — ROSUVASTATIN CALCIUM 5 MG/1
5 TABLET, COATED ORAL DAILY
Qty: 90 TABLET | Refills: 0 | Status: SHIPPED | OUTPATIENT
Start: 2024-12-04 | End: 2024-12-05 | Stop reason: SDUPTHER

## 2024-12-04 RX ORDER — AMLODIPINE BESYLATE 5 MG/1
5 TABLET ORAL DAILY
Qty: 90 TABLET | Refills: 1 | Status: SHIPPED | OUTPATIENT
Start: 2024-12-04

## 2024-12-04 RX ORDER — LEVOTHYROXINE SODIUM 25 UG/1
25 TABLET ORAL DAILY
Qty: 100 TABLET | Refills: 0 | Status: SHIPPED | OUTPATIENT
Start: 2024-12-04 | End: 2024-12-05 | Stop reason: SDUPTHER

## 2024-12-04 RX ORDER — HYDROCHLOROTHIAZIDE 12.5 MG/1
12.5 TABLET ORAL DAILY
Qty: 100 TABLET | Refills: 0 | Status: SHIPPED | OUTPATIENT
Start: 2024-12-04 | End: 2024-12-05 | Stop reason: SDUPTHER

## 2024-12-04 NOTE — LETTER
December 4, 2024    Page Mejia  [address]  [facility] [address]      Dear Ms. Mejia:        If you have any questions or concerns, please don't hesitate to call.    Sincerely,      Recent Results (from the past week)   TSH, 3rd generation    Collection Time: 12/04/24  8:46 AM   Result Value Ref Range    TSH 3RD GENERATON 0.987 0.450 - 4.500 uIU/mL       Thyroid study is acceptable        Frank Lombardi, DO        CC: No Recipients

## 2024-12-04 NOTE — TELEPHONE ENCOUNTER
Patient called needs prescription by Thursday leaving on vacation Monday will be gone for couple months     Please call patient when refill to arrange  does not have my chart [phone]

## 2024-12-04 NOTE — TELEPHONE ENCOUNTER
Please call patient.  Labs are back to look pretty good.  Please schedule patient for an AWV after February 15 thank you

## 2024-12-04 NOTE — TELEPHONE ENCOUNTER
Frank Lombardi,        12/4/24 10:23 AM  Note      Please call patient.  Labs are back to look pretty good.  Please schedule patient for an AWV after February 15 thank you

## 2024-12-05 RX ORDER — ROSUVASTATIN CALCIUM 5 MG/1
5 TABLET, COATED ORAL DAILY
Qty: 90 TABLET | Refills: 3 | Status: SHIPPED | OUTPATIENT
Start: 2024-12-05

## 2024-12-05 RX ORDER — HYDROCHLOROTHIAZIDE 12.5 MG/1
12.5 TABLET ORAL DAILY
Qty: 90 TABLET | Refills: 3 | Status: SHIPPED | OUTPATIENT
Start: 2024-12-05

## 2024-12-05 RX ORDER — LEVOTHYROXINE SODIUM 25 UG/1
25 TABLET ORAL DAILY
Qty: 90 TABLET | Refills: 3 | Status: SHIPPED | OUTPATIENT
Start: 2024-12-05

## 2024-12-05 NOTE — TELEPHONE ENCOUNTER
Escribed.  Looks like they m,ay have been done yesterday but ill send gain.  Looks like dr Chand did the amlodipine yesterday

## 2024-12-05 NOTE — TELEPHONE ENCOUNTER
Spoke with pt who stated she is leaving the country this friday and needs extra refills on her medication because she will be gone for 6 months. She stated she has called twice and no one has called her back. Please send this medication [facility] in [facility] since she leaves this Friday. She will call for the apt after she gets back.

## 2024-12-09 NOTE — TELEPHONE ENCOUNTER
Called [facility] pharmacy, they stated they have all of the prescriptions there waiting for patient to .   Called patient and she stated she was in contact with the pharmacy but they told her she has to pay out of pocket or use a discount card. She stated she will  her medications next month because her insurance will pay for it next month. No further action  Victoria Carson, CMA

## 2024-12-09 NOTE — TELEPHONE ENCOUNTER
Pt is going out of the country for 6 mon and is leaving tomorrow and states the pharmacy insistent they never got the Rx refills for the following;    Hydrochlorothiazide  Levothyroxine  Crestor   Amlodipine     Patient needs refills for 6mons to take with her

## 2025-07-16 DIAGNOSIS — I10 BENIGN ESSENTIAL HYPERTENSION: ICD-10-CM

## 2025-07-17 RX ORDER — AMLODIPINE BESYLATE 5 MG/1
5 TABLET ORAL DAILY
Qty: 90 TABLET | Refills: 1 | Status: SHIPPED | OUTPATIENT
Start: 2025-07-17

## (undated) DEVICE — THE MONARCH® "D" CARTRIDGE IS A SINGLE-USE POLYPROPYLENE CARTRIDGE FOR POSTERIOR CHAMBER IOL DELIVERY: Brand: MONARCH® III

## (undated) DEVICE — EYE PACK CUSTOM -FINNEGAN

## (undated) DEVICE — CLEARCUT® SLIT KNIFE INTREPID MICRO-COAXIAL SYSTEM 2.4 SB: Brand: CLEARCUT®; INTREPID

## (undated) DEVICE — ACTIVE FMS W/ INTREPID* ULTRA SLEEVES, 0.9MM 45° ABS* INTREPID* BALANCED TIP: Brand: ALCON

## (undated) DEVICE — MICROSURGICAL INSTRUMENT IRR. CYSTITOME 25GA STRAIGHT-REVERSE CUTTING: Brand: ALCON

## (undated) DEVICE — INTREPID® TRANSFORMER IA HP: Brand: INTREPID®

## (undated) DEVICE — GLOVE SRG BIOGEL 7.5

## (undated) DEVICE — B-H IRRIGATING CAN 19GA FLAT ANGLED 8MM: Brand: OPHTHALMIC CANNULA

## (undated) DEVICE — AIR INJECT CANNULA 27GA: Brand: OPHTHALMIC CANNULA